# Patient Record
Sex: MALE | Race: WHITE | NOT HISPANIC OR LATINO | Employment: FULL TIME | URBAN - METROPOLITAN AREA
[De-identification: names, ages, dates, MRNs, and addresses within clinical notes are randomized per-mention and may not be internally consistent; named-entity substitution may affect disease eponyms.]

---

## 2017-01-06 ENCOUNTER — TRANSCRIBE ORDERS (OUTPATIENT)
Dept: ADMINISTRATIVE | Facility: HOSPITAL | Age: 42
End: 2017-01-06

## 2017-01-06 DIAGNOSIS — R59.9 SWOLLEN LYMPH NODES: Primary | ICD-10-CM

## 2017-01-12 ENCOUNTER — HOSPITAL ENCOUNTER (OUTPATIENT)
Dept: ULTRASOUND IMAGING | Facility: HOSPITAL | Age: 42
Discharge: HOME | End: 2017-01-12
Attending: SURGERY
Payer: COMMERCIAL

## 2017-01-12 DIAGNOSIS — R59.9 SWOLLEN LYMPH NODES: ICD-10-CM

## 2017-01-12 PROCEDURE — 76705 ECHO EXAM OF ABDOMEN: CPT

## 2017-01-23 ENCOUNTER — ANESTHESIA EVENT (OUTPATIENT)
Dept: PERIOP | Facility: HOSPITAL | Age: 42
End: 2017-01-23
Payer: COMMERCIAL

## 2017-01-23 RX ORDER — ACETAMINOPHEN 325 MG/1
650 TABLET ORAL EVERY 6 HOURS PRN
COMMUNITY

## 2017-01-23 RX ORDER — IBUPROFEN 400 MG/1
400 TABLET ORAL EVERY 6 HOURS PRN
COMMUNITY
End: 2020-10-06

## 2017-01-23 RX ORDER — CYCLOBENZAPRINE HCL 10 MG
10 TABLET ORAL 3 TIMES DAILY PRN
COMMUNITY
End: 2020-10-06

## 2017-01-23 RX ORDER — GABAPENTIN 300 MG/1
300 CAPSULE ORAL 3 TIMES DAILY
COMMUNITY
End: 2020-10-06

## 2017-01-24 ENCOUNTER — ANESTHESIA (OUTPATIENT)
Dept: PERIOP | Facility: HOSPITAL | Age: 42
End: 2017-01-24
Payer: COMMERCIAL

## 2017-01-24 ENCOUNTER — HOSPITAL ENCOUNTER (OUTPATIENT)
Facility: HOSPITAL | Age: 42
Setting detail: OUTPATIENT SURGERY
Discharge: HOME | End: 2017-01-24
Attending: SURGERY | Admitting: SURGERY
Payer: COMMERCIAL

## 2017-01-24 VITALS
WEIGHT: 195 LBS | HEIGHT: 72 IN | TEMPERATURE: 99.5 F | SYSTOLIC BLOOD PRESSURE: 144 MMHG | BODY MASS INDEX: 26.41 KG/M2 | DIASTOLIC BLOOD PRESSURE: 80 MMHG | RESPIRATION RATE: 16 BRPM | HEART RATE: 75 BPM | OXYGEN SATURATION: 96 %

## 2017-01-24 DIAGNOSIS — R59.9 ENLARGED LYMPH NODES, UNSPECIFIED: ICD-10-CM

## 2017-01-24 PROCEDURE — 88184 FLOWCYTOMETRY/ TC 1 MARKER: CPT | Performed by: SURGERY

## 2017-01-24 PROCEDURE — 88185 FLOWCYTOMETRY/TC ADD-ON: CPT | Performed by: ANESTHESIOLOGY

## 2017-01-24 PROCEDURE — 87205 SMEAR GRAM STAIN: CPT | Performed by: SURGERY

## 2017-01-24 PROCEDURE — 87075 CULTR BACTERIA EXCEPT BLOOD: CPT | Performed by: SURGERY

## 2017-01-24 PROCEDURE — 88305 TISSUE EXAM BY PATHOLOGIST: CPT | Performed by: SURGERY

## 2017-01-24 PROCEDURE — 88312 SPECIAL STAINS GROUP 1: CPT | Performed by: SURGERY

## 2017-01-24 PROCEDURE — 87116 MYCOBACTERIA CULTURE: CPT | Performed by: SURGERY

## 2017-01-24 PROCEDURE — 87206 SMEAR FLUORESCENT/ACID STAI: CPT | Performed by: SURGERY

## 2017-01-24 PROCEDURE — 87102 FUNGUS ISOLATION CULTURE: CPT | Performed by: SURGERY

## 2017-01-24 PROCEDURE — 87070 CULTURE OTHR SPECIMN AEROBIC: CPT | Performed by: SURGERY

## 2017-01-24 PROCEDURE — 87176 TISSUE HOMOGENIZATION CULTR: CPT | Performed by: SURGERY

## 2017-01-24 RX ORDER — OXYCODONE HYDROCHLORIDE AND ACETAMINOPHEN 5; 325 MG/1; MG/1
2 TABLET ORAL EVERY 4 HOURS PRN
Status: DISCONTINUED | OUTPATIENT
Start: 2017-01-24 | End: 2017-01-24 | Stop reason: HOSPADM

## 2017-01-24 RX ORDER — PROPOFOL 10 MG/ML
INJECTION, EMULSION INTRAVENOUS AS NEEDED
Status: DISCONTINUED | OUTPATIENT
Start: 2017-01-24 | End: 2017-01-24 | Stop reason: SURG

## 2017-01-24 RX ORDER — FENTANYL CITRATE 50 UG/ML
INJECTION, SOLUTION INTRAMUSCULAR; INTRAVENOUS AS NEEDED
Status: DISCONTINUED | OUTPATIENT
Start: 2017-01-24 | End: 2017-01-24 | Stop reason: SURG

## 2017-01-24 RX ORDER — SODIUM CHLORIDE, SODIUM LACTATE, POTASSIUM CHLORIDE, CALCIUM CHLORIDE 600; 310; 30; 20 MG/100ML; MG/100ML; MG/100ML; MG/100ML
125 INJECTION, SOLUTION INTRAVENOUS CONTINUOUS
Status: DISCONTINUED | OUTPATIENT
Start: 2017-01-24 | End: 2017-01-24 | Stop reason: HOSPADM

## 2017-01-24 RX ORDER — FENTANYL CITRATE/PF 50 MCG/ML
12.5 SYRINGE (ML) INJECTION
Status: DISCONTINUED | OUTPATIENT
Start: 2017-01-24 | End: 2017-01-24 | Stop reason: HOSPADM

## 2017-01-24 RX ORDER — ONDANSETRON 2 MG/ML
INJECTION INTRAMUSCULAR; INTRAVENOUS AS NEEDED
Status: DISCONTINUED | OUTPATIENT
Start: 2017-01-24 | End: 2017-01-24 | Stop reason: SURG

## 2017-01-24 RX ORDER — KETOROLAC TROMETHAMINE 30 MG/ML
INJECTION, SOLUTION INTRAMUSCULAR; INTRAVENOUS AS NEEDED
Status: DISCONTINUED | OUTPATIENT
Start: 2017-01-24 | End: 2017-01-24 | Stop reason: SURG

## 2017-01-24 RX ORDER — MIDAZOLAM HYDROCHLORIDE 1 MG/ML
INJECTION INTRAMUSCULAR; INTRAVENOUS AS NEEDED
Status: DISCONTINUED | OUTPATIENT
Start: 2017-01-24 | End: 2017-01-24 | Stop reason: SURG

## 2017-01-24 RX ORDER — SODIUM CHLORIDE, SODIUM LACTATE, POTASSIUM CHLORIDE, CALCIUM CHLORIDE 600; 310; 30; 20 MG/100ML; MG/100ML; MG/100ML; MG/100ML
100 INJECTION, SOLUTION INTRAVENOUS CONTINUOUS
Status: DISCONTINUED | OUTPATIENT
Start: 2017-01-24 | End: 2017-01-24 | Stop reason: HOSPADM

## 2017-01-24 RX ORDER — ONDANSETRON 2 MG/ML
4 INJECTION INTRAMUSCULAR; INTRAVENOUS EVERY 4 HOURS PRN
Status: DISCONTINUED | OUTPATIENT
Start: 2017-01-24 | End: 2017-01-24 | Stop reason: HOSPADM

## 2017-01-24 RX ORDER — CALCIUM CARBONATE 200(500)MG
2 TABLET,CHEWABLE ORAL 2 TIMES DAILY
COMMUNITY

## 2017-01-24 RX ORDER — BUPIVACAINE HYDROCHLORIDE AND EPINEPHRINE 2.5; 5 MG/ML; UG/ML
INJECTION, SOLUTION EPIDURAL; INFILTRATION; INTRACAUDAL; PERINEURAL AS NEEDED
Status: DISCONTINUED | OUTPATIENT
Start: 2017-01-24 | End: 2017-01-24 | Stop reason: HOSPADM

## 2017-01-24 RX ORDER — MORPHINE SULFATE 4 MG/ML
4 INJECTION, SOLUTION INTRAMUSCULAR; INTRAVENOUS
Status: DISCONTINUED | OUTPATIENT
Start: 2017-01-24 | End: 2017-01-24 | Stop reason: HOSPADM

## 2017-01-24 RX ORDER — SODIUM CHLORIDE, SODIUM LACTATE, POTASSIUM CHLORIDE, CALCIUM CHLORIDE 600; 310; 30; 20 MG/100ML; MG/100ML; MG/100ML; MG/100ML
50 INJECTION, SOLUTION INTRAVENOUS CONTINUOUS
Status: DISCONTINUED | OUTPATIENT
Start: 2017-01-24 | End: 2017-01-24 | Stop reason: HOSPADM

## 2017-01-24 RX ADMIN — SODIUM CHLORIDE, SODIUM LACTATE, POTASSIUM CHLORIDE, AND CALCIUM CHLORIDE 100 ML/HR: .6; .31; .03; .02 INJECTION, SOLUTION INTRAVENOUS at 10:47

## 2017-01-24 RX ADMIN — DEXAMETHASONE SODIUM PHOSPHATE 5 MG: 10 INJECTION INTRAMUSCULAR; INTRAVENOUS at 09:40

## 2017-01-24 RX ADMIN — FENTANYL CITRATE 25 MCG: 50 INJECTION, SOLUTION INTRAMUSCULAR; INTRAVENOUS at 10:00

## 2017-01-24 RX ADMIN — KETOROLAC TROMETHAMINE 30 MG: 30 INJECTION, SOLUTION INTRAMUSCULAR at 10:00

## 2017-01-24 RX ADMIN — SODIUM CHLORIDE, SODIUM LACTATE, POTASSIUM CHLORIDE, AND CALCIUM CHLORIDE 50 ML/HR: .6; .31; .03; .02 INJECTION, SOLUTION INTRAVENOUS at 08:25

## 2017-01-24 RX ADMIN — CEFAZOLIN SODIUM 2000 MG: 2 SOLUTION INTRAVENOUS at 09:35

## 2017-01-24 RX ADMIN — MIDAZOLAM HYDROCHLORIDE 2 MG: 1 INJECTION, SOLUTION INTRAMUSCULAR; INTRAVENOUS at 09:26

## 2017-01-24 RX ADMIN — PROPOFOL 200 MG: 10 INJECTION, EMULSION INTRAVENOUS at 09:30

## 2017-01-24 RX ADMIN — SODIUM CHLORIDE, SODIUM LACTATE, POTASSIUM CHLORIDE, AND CALCIUM CHLORIDE: .6; .31; .03; .02 INJECTION, SOLUTION INTRAVENOUS at 09:26

## 2017-01-24 RX ADMIN — FENTANYL CITRATE 25 MCG: 50 INJECTION, SOLUTION INTRAMUSCULAR; INTRAVENOUS at 09:59

## 2017-01-24 RX ADMIN — FENTANYL CITRATE 25 MCG: 50 INJECTION, SOLUTION INTRAMUSCULAR; INTRAVENOUS at 09:51

## 2017-01-24 RX ADMIN — ONDANSETRON 4 MG: 2 INJECTION INTRAMUSCULAR; INTRAVENOUS at 09:40

## 2017-01-24 RX ADMIN — FENTANYL CITRATE 25 MCG: 50 INJECTION, SOLUTION INTRAMUSCULAR; INTRAVENOUS at 09:30

## 2017-01-27 LAB
BACTERIA SPEC ANAEROBE CULT: NO GROWTH
BACTERIA TISS AEROBE CULT: NO GROWTH
GRAM STN SPEC: NORMAL

## 2017-01-31 LAB — SCAN RESULT: NORMAL

## 2017-02-27 LAB — FUNGUS SPEC CULT: NORMAL

## 2017-03-13 LAB
MYCOBACTERIUM SPEC CULT: NORMAL
RHODAMINE-AURAMINE STN SPEC: NORMAL

## 2017-05-11 ENCOUNTER — ALLSCRIPTS OFFICE VISIT (OUTPATIENT)
Dept: OTHER | Facility: OTHER | Age: 42
End: 2017-05-11

## 2017-06-29 ENCOUNTER — ALLSCRIPTS OFFICE VISIT (OUTPATIENT)
Dept: OTHER | Facility: OTHER | Age: 42
End: 2017-06-29

## 2018-01-11 NOTE — MISCELLANEOUS
Provider Comments  Provider Comments:   PT WAS A NO SHOW FOR HIS OV WITH DR MARQUES ON 06/29/2017 / Christelle Casas      Signatures   Electronically signed by : Stephen Sutton DO; Jun 29 2017  3:27PM EST                       (Author)

## 2018-01-13 NOTE — MISCELLANEOUS
Provider Comments  Provider Comments:   PATIENT WAS A NO SHOW FOR APPOINTMENT WITH DR Phoebe Peck      Signatures   Electronically signed by : Donta Fermin DO; May 12 2017  8:29AM EST                       (Author)

## 2020-10-06 ENCOUNTER — TELEMEDICINE (OUTPATIENT)
Dept: GASTROENTEROLOGY | Facility: AMBULARY SURGERY CENTER | Age: 45
End: 2020-10-06
Payer: COMMERCIAL

## 2020-10-06 ENCOUNTER — TELEPHONE (OUTPATIENT)
Dept: GASTROENTEROLOGY | Facility: AMBULARY SURGERY CENTER | Age: 45
End: 2020-10-06

## 2020-10-06 DIAGNOSIS — K44.9 HIATAL HERNIA: ICD-10-CM

## 2020-10-06 DIAGNOSIS — K21.9 GASTROESOPHAGEAL REFLUX DISEASE, UNSPECIFIED WHETHER ESOPHAGITIS PRESENT: Primary | ICD-10-CM

## 2020-10-06 PROCEDURE — 99202 OFFICE O/P NEW SF 15 MIN: CPT | Performed by: INTERNAL MEDICINE

## 2020-10-06 RX ORDER — PANTOPRAZOLE SODIUM 40 MG/1
40 TABLET, DELAYED RELEASE ORAL DAILY
Qty: 30 TABLET | Refills: 3 | Status: SHIPPED | OUTPATIENT
Start: 2020-10-06

## 2020-10-06 RX ORDER — ALPRAZOLAM 1 MG/1
1 TABLET ORAL 3 TIMES DAILY
COMMUNITY

## 2020-10-06 RX ORDER — CITALOPRAM 40 MG/1
40 TABLET ORAL DAILY
COMMUNITY

## 2020-10-06 RX ORDER — BUPRENORPHINE AND NALOXONE 12; 3 MG/1; MG/1
12 FILM, SOLUBLE BUCCAL; SUBLINGUAL 3 TIMES DAILY
COMMUNITY

## 2020-10-15 ENCOUNTER — TELEPHONE (OUTPATIENT)
Dept: GASTROENTEROLOGY | Facility: AMBULARY SURGERY CENTER | Age: 45
End: 2020-10-15

## 2020-11-01 ENCOUNTER — HOSPITAL ENCOUNTER (EMERGENCY)
Facility: HOSPITAL | Age: 45
Discharge: HOME | End: 2020-11-01
Attending: EMERGENCY MEDICINE | Admitting: EMERGENCY MEDICINE
Payer: COMMERCIAL

## 2020-11-01 ENCOUNTER — APPOINTMENT (EMERGENCY)
Dept: RADIOLOGY | Facility: HOSPITAL | Age: 45
End: 2020-11-01
Payer: COMMERCIAL

## 2020-11-01 VITALS
SYSTOLIC BLOOD PRESSURE: 106 MMHG | DIASTOLIC BLOOD PRESSURE: 63 MMHG | RESPIRATION RATE: 18 BRPM | WEIGHT: 205 LBS | HEART RATE: 70 BPM | TEMPERATURE: 98.1 F | BODY MASS INDEX: 27.8 KG/M2 | OXYGEN SATURATION: 95 %

## 2020-11-01 DIAGNOSIS — R50.9 FEVER OF UNKNOWN ORIGIN: Primary | ICD-10-CM

## 2020-11-01 LAB
ALBUMIN SERPL BCP-MCNC: 4.1 G/DL (ref 3.4–4.8)
ALP SERPL-CCNC: 85.5 U/L (ref 10–129)
ALT SERPL W P-5'-P-CCNC: 17 U/L (ref 5–63)
ANION GAP SERPL CALCULATED.3IONS-SCNC: 6 MMOL/L (ref 4–13)
APTT PPP: 29 SECONDS (ref 23–31)
AST SERPL W P-5'-P-CCNC: 29 U/L (ref 15–41)
BASOPHILS # BLD AUTO: 0.02 THOUSANDS/ΜL (ref 0–0.1)
BASOPHILS NFR BLD AUTO: 0 % (ref 0–1)
BILIRUB SERPL-MCNC: 0.28 MG/DL (ref 0.3–1.2)
BILIRUB UR QL STRIP: ABNORMAL
BUN SERPL-MCNC: 14 MG/DL (ref 6–20)
CALCIUM SERPL-MCNC: 8.9 MG/DL (ref 8.4–10.2)
CHLORIDE SERPL-SCNC: 105 MMOL/L (ref 96–108)
CLARITY UR: CLEAR
CO2 SERPL-SCNC: 27 MMOL/L (ref 22–33)
COLOR UR: YELLOW
CREAT SERPL-MCNC: 0.72 MG/DL (ref 0.5–1.2)
CRP SERPL QL: 0.3 MG/L (ref 0–1)
EOSINOPHIL # BLD AUTO: 0.25 THOUSAND/ΜL (ref 0–0.61)
EOSINOPHIL NFR BLD AUTO: 3 % (ref 0–6)
ERYTHROCYTE [DISTWIDTH] IN BLOOD BY AUTOMATED COUNT: 13.4 % (ref 11.6–15.1)
FLUAV RNA NPH QL NAA+PROBE: NORMAL
FLUBV RNA NPH QL NAA+PROBE: NORMAL
GFR SERPL CREATININE-BSD FRML MDRD: 114 ML/MIN/1.73SQ M
GLUCOSE SERPL-MCNC: 91 MG/DL (ref 65–140)
GLUCOSE UR STRIP-MCNC: NEGATIVE MG/DL
HCT VFR BLD AUTO: 43 % (ref 36.5–49.3)
HGB BLD-MCNC: 14.8 G/DL (ref 12–17)
HGB UR QL STRIP.AUTO: NEGATIVE
IMM GRANULOCYTES # BLD AUTO: 0.05 THOUSAND/UL (ref 0–0.2)
IMM GRANULOCYTES NFR BLD AUTO: 1 % (ref 0–2)
INR PPP: 0.98 (ref 0.9–1.1)
KETONES UR STRIP-MCNC: NEGATIVE MG/DL
LACTATE SERPL-SCNC: 1.9 MMOL/L (ref 0–2)
LEUKOCYTE ESTERASE UR QL STRIP: NEGATIVE
LYMPHOCYTES # BLD AUTO: 2.6 THOUSANDS/ΜL (ref 0.6–4.47)
LYMPHOCYTES NFR BLD AUTO: 35 % (ref 14–44)
MCH RBC QN AUTO: 30.6 PG (ref 26.8–34.3)
MCHC RBC AUTO-ENTMCNC: 34.4 G/DL (ref 31.4–37.4)
MCV RBC AUTO: 89 FL (ref 82–98)
MONOCYTES # BLD AUTO: 0.46 THOUSAND/ΜL (ref 0.17–1.22)
MONOCYTES NFR BLD AUTO: 6 % (ref 4–12)
NEUTROPHILS # BLD AUTO: 4 THOUSANDS/ΜL (ref 1.85–7.62)
NEUTS SEG NFR BLD AUTO: 55 % (ref 43–75)
NITRITE UR QL STRIP: NEGATIVE
PH UR STRIP.AUTO: 5.5 [PH]
PLATELET # BLD AUTO: 208 THOUSANDS/UL (ref 149–390)
PMV BLD AUTO: 11.1 FL (ref 8.9–12.7)
POTASSIUM SERPL-SCNC: 4.5 MMOL/L (ref 3.5–5)
POTASSIUM SERPL-SCNC: 5.1 MMOL/L (ref 3.5–5)
PROT SERPL-MCNC: 7.4 G/DL (ref 6.4–8.3)
PROT UR STRIP-MCNC: NEGATIVE MG/DL
PROTHROMBIN TIME: 11.1 SECONDS (ref 9.5–12.1)
RBC # BLD AUTO: 4.83 MILLION/UL (ref 3.88–5.62)
RSV RNA NPH QL NAA+PROBE: NORMAL
SODIUM SERPL-SCNC: 138 MMOL/L (ref 133–145)
SP GR UR STRIP.AUTO: >=1.03 (ref 1–1.03)
TROPONIN I SERPL-MCNC: <0.03 NG/ML (ref 0–0.07)
UROBILINOGEN UR QL STRIP.AUTO: 0.2 E.U./DL
WBC # BLD AUTO: 7.38 THOUSAND/UL (ref 4.31–10.16)

## 2020-11-01 PROCEDURE — 81003 URINALYSIS AUTO W/O SCOPE: CPT | Performed by: EMERGENCY MEDICINE

## 2020-11-01 PROCEDURE — 86666 EHRLICHIA ANTIBODY: CPT | Performed by: EMERGENCY MEDICINE

## 2020-11-01 PROCEDURE — 84132 ASSAY OF SERUM POTASSIUM: CPT | Performed by: EMERGENCY MEDICINE

## 2020-11-01 PROCEDURE — 87147 CULTURE TYPE IMMUNOLOGIC: CPT | Performed by: EMERGENCY MEDICINE

## 2020-11-01 PROCEDURE — 80053 COMPREHEN METABOLIC PANEL: CPT | Performed by: EMERGENCY MEDICINE

## 2020-11-01 PROCEDURE — 96360 HYDRATION IV INFUSION INIT: CPT

## 2020-11-01 PROCEDURE — 83605 ASSAY OF LACTIC ACID: CPT | Performed by: EMERGENCY MEDICINE

## 2020-11-01 PROCEDURE — 84484 ASSAY OF TROPONIN QUANT: CPT | Performed by: EMERGENCY MEDICINE

## 2020-11-01 PROCEDURE — 71046 X-RAY EXAM CHEST 2 VIEWS: CPT

## 2020-11-01 PROCEDURE — 99284 EMERGENCY DEPT VISIT MOD MDM: CPT

## 2020-11-01 PROCEDURE — 85730 THROMBOPLASTIN TIME PARTIAL: CPT | Performed by: EMERGENCY MEDICINE

## 2020-11-01 PROCEDURE — 86618 LYME DISEASE ANTIBODY: CPT | Performed by: EMERGENCY MEDICINE

## 2020-11-01 PROCEDURE — 36415 COLL VENOUS BLD VENIPUNCTURE: CPT | Performed by: EMERGENCY MEDICINE

## 2020-11-01 PROCEDURE — 86140 C-REACTIVE PROTEIN: CPT | Performed by: EMERGENCY MEDICINE

## 2020-11-01 PROCEDURE — 80074 ACUTE HEPATITIS PANEL: CPT | Performed by: EMERGENCY MEDICINE

## 2020-11-01 PROCEDURE — 86753 PROTOZOA ANTIBODY NOS: CPT | Performed by: EMERGENCY MEDICINE

## 2020-11-01 PROCEDURE — 86480 TB TEST CELL IMMUN MEASURE: CPT | Performed by: EMERGENCY MEDICINE

## 2020-11-01 PROCEDURE — 99284 EMERGENCY DEPT VISIT MOD MDM: CPT | Performed by: EMERGENCY MEDICINE

## 2020-11-01 PROCEDURE — 85610 PROTHROMBIN TIME: CPT | Performed by: EMERGENCY MEDICINE

## 2020-11-01 PROCEDURE — 87631 RESP VIRUS 3-5 TARGETS: CPT | Performed by: EMERGENCY MEDICINE

## 2020-11-01 PROCEDURE — 85025 COMPLETE CBC W/AUTO DIFF WBC: CPT | Performed by: EMERGENCY MEDICINE

## 2020-11-01 PROCEDURE — 87040 BLOOD CULTURE FOR BACTERIA: CPT | Performed by: EMERGENCY MEDICINE

## 2020-11-01 RX ADMIN — SODIUM CHLORIDE 1000 ML: 0.9 INJECTION, SOLUTION INTRAVENOUS at 16:06

## 2020-11-02 LAB
HAV IGM SER QL: ABNORMAL
HBV CORE IGM SER QL: ABNORMAL
HBV SURFACE AG SER QL: ABNORMAL
HCV AB SER QL: ABNORMAL

## 2020-11-03 LAB — B BURGDOR IGG+IGM SER-ACNC: 50

## 2020-11-04 LAB
A PHAGOCYTOPH IGG TITR SER IF: NEGATIVE {TITER}
A PHAGOCYTOPH IGM TITR SER IF: NEGATIVE {TITER}
B MICROTI IGG TITR SER: NORMAL {TITER}
B MICROTI IGM TITR SER: NORMAL {TITER}
BACTERIA BLD CULT: ABNORMAL
E CHAFFEENSIS IGG TITR SER IF: NEGATIVE {TITER}
E CHAFFEENSIS IGM TITR SER IF: NEGATIVE {TITER}
GAMMA INTERFERON BACKGROUND BLD IA-ACNC: 0.04 IU/ML
GRAM STN SPEC: ABNORMAL
M TB IFN-G BLD-IMP: NEGATIVE
M TB IFN-G CD4+ BCKGRND COR BLD-ACNC: 0 IU/ML
M TB IFN-G CD4+ BCKGRND COR BLD-ACNC: 0.01 IU/ML
MITOGEN IGNF BCKGRD COR BLD-ACNC: >10 IU/ML

## 2020-11-06 LAB — BACTERIA BLD CULT: NORMAL

## 2020-11-12 ENCOUNTER — LAB (OUTPATIENT)
Dept: LAB | Facility: HOSPITAL | Age: 45
End: 2020-11-12
Payer: COMMERCIAL

## 2020-11-12 ENCOUNTER — DOCUMENTATION (OUTPATIENT)
Dept: INFECTIOUS DISEASES | Facility: CLINIC | Age: 45
End: 2020-11-12

## 2020-11-12 ENCOUNTER — OFFICE VISIT (OUTPATIENT)
Dept: INFECTIOUS DISEASES | Facility: CLINIC | Age: 45
End: 2020-11-12
Payer: COMMERCIAL

## 2020-11-12 VITALS
RESPIRATION RATE: 17 BRPM | WEIGHT: 213 LBS | HEART RATE: 97 BPM | TEMPERATURE: 99.8 F | DIASTOLIC BLOOD PRESSURE: 65 MMHG | HEIGHT: 72 IN | BODY MASS INDEX: 28.85 KG/M2 | SYSTOLIC BLOOD PRESSURE: 125 MMHG

## 2020-11-12 DIAGNOSIS — R53.81 MALAISE AND FATIGUE: ICD-10-CM

## 2020-11-12 DIAGNOSIS — R50.9 FUO (FEVER OF UNKNOWN ORIGIN): ICD-10-CM

## 2020-11-12 DIAGNOSIS — R74.01 TRANSAMINITIS: ICD-10-CM

## 2020-11-12 DIAGNOSIS — R53.83 MALAISE AND FATIGUE: ICD-10-CM

## 2020-11-12 DIAGNOSIS — R50.9 FUO (FEVER OF UNKNOWN ORIGIN): Primary | ICD-10-CM

## 2020-11-12 LAB
ERYTHROCYTE [SEDIMENTATION RATE] IN BLOOD: 13 MM/HOUR (ref 0–14)
TSH SERPL DL<=0.05 MIU/L-ACNC: 1.4 UIU/ML (ref 0.36–3.74)

## 2020-11-12 PROCEDURE — 84443 ASSAY THYROID STIM HORMONE: CPT

## 2020-11-12 PROCEDURE — 86665 EPSTEIN-BARR CAPSID VCA: CPT

## 2020-11-12 PROCEDURE — 99204 OFFICE O/P NEW MOD 45 MIN: CPT | Performed by: INTERNAL MEDICINE

## 2020-11-12 PROCEDURE — 87389 HIV-1 AG W/HIV-1&-2 AB AG IA: CPT

## 2020-11-12 PROCEDURE — 86663 EPSTEIN-BARR ANTIBODY: CPT

## 2020-11-12 PROCEDURE — 87040 BLOOD CULTURE FOR BACTERIA: CPT

## 2020-11-12 PROCEDURE — 86664 EPSTEIN-BARR NUCLEAR ANTIGEN: CPT

## 2020-11-12 PROCEDURE — 85652 RBC SED RATE AUTOMATED: CPT

## 2020-11-12 PROCEDURE — 87522 HEPATITIS C REVRS TRNSCRPJ: CPT

## 2020-11-12 PROCEDURE — 36415 COLL VENOUS BLD VENIPUNCTURE: CPT

## 2020-11-12 PROCEDURE — 86038 ANTINUCLEAR ANTIBODIES: CPT

## 2020-11-12 PROCEDURE — 86592 SYPHILIS TEST NON-TREP QUAL: CPT

## 2020-11-13 LAB
EBV NA IGG SER IA-ACNC: 369 U/ML (ref 0–17.9)
EBV VCA IGG SER IA-ACNC: 37.7 U/ML (ref 0–17.9)
EBV VCA IGM SER IA-ACNC: <36 U/ML (ref 0–35.9)
HIV 1+2 AB+HIV1 P24 AG SERPL QL IA: NORMAL
INTERPRETATION: ABNORMAL
RPR SER QL: NORMAL
RYE IGE QN: NEGATIVE

## 2020-11-15 LAB
HCV RNA SERPL NAA+PROBE-ACNC: 360 IU/ML
HCV RNA SERPL NAA+PROBE-LOG IU: 2.56 LOG10 IU/ML
TEST INFORMATION: NORMAL

## 2020-11-17 LAB — BACTERIA BLD CULT: NORMAL

## 2020-11-20 DIAGNOSIS — R76.8 HEPATITIS C ANTIBODY POSITIVE IN BLOOD: Primary | ICD-10-CM

## 2020-12-03 RX ORDER — SODIUM CHLORIDE, SODIUM LACTATE, POTASSIUM CHLORIDE, CALCIUM CHLORIDE 600; 310; 30; 20 MG/100ML; MG/100ML; MG/100ML; MG/100ML
125 INJECTION, SOLUTION INTRAVENOUS CONTINUOUS
Status: CANCELLED | OUTPATIENT
Start: 2020-12-03

## 2020-12-04 ENCOUNTER — HOSPITAL ENCOUNTER (OUTPATIENT)
Dept: GASTROENTEROLOGY | Facility: AMBULARY SURGERY CENTER | Age: 45
Setting detail: OUTPATIENT SURGERY
Discharge: HOME | End: 2020-12-04
Attending: INTERNAL MEDICINE

## 2023-01-05 ENCOUNTER — APPOINTMENT (EMERGENCY)
Dept: RADIOLOGY | Facility: HOSPITAL | Age: 48
End: 2023-01-05

## 2023-01-05 ENCOUNTER — HOSPITAL ENCOUNTER (EMERGENCY)
Facility: HOSPITAL | Age: 48
Discharge: HOME | End: 2023-01-05
Attending: EMERGENCY MEDICINE

## 2023-01-05 ENCOUNTER — APPOINTMENT (EMERGENCY)
Dept: CT IMAGING | Facility: HOSPITAL | Age: 48
End: 2023-01-05

## 2023-01-05 VITALS
DIASTOLIC BLOOD PRESSURE: 76 MMHG | TEMPERATURE: 98.7 F | SYSTOLIC BLOOD PRESSURE: 109 MMHG | RESPIRATION RATE: 20 BRPM | HEART RATE: 80 BPM | OXYGEN SATURATION: 95 %

## 2023-01-05 DIAGNOSIS — E86.0 DEHYDRATION: ICD-10-CM

## 2023-01-05 DIAGNOSIS — R55 SYNCOPE: Primary | ICD-10-CM

## 2023-01-05 LAB
ALBUMIN SERPL BCP-MCNC: 4.1 G/DL (ref 3.5–5)
ALP SERPL-CCNC: 97 U/L (ref 34–104)
ALT SERPL W P-5'-P-CCNC: 99 U/L (ref 7–52)
ANION GAP SERPL CALCULATED.3IONS-SCNC: 11 MMOL/L (ref 4–13)
AST SERPL W P-5'-P-CCNC: 70 U/L (ref 13–39)
BASOPHILS # BLD AUTO: 0.03 THOUSANDS/ÂΜL (ref 0–0.1)
BASOPHILS NFR BLD AUTO: 1 % (ref 0–1)
BILIRUB SERPL-MCNC: 0.98 MG/DL (ref 0.2–1)
BILIRUB UR QL STRIP: NEGATIVE
BUN SERPL-MCNC: 13 MG/DL (ref 5–25)
CALCIUM SERPL-MCNC: 9.1 MG/DL (ref 8.4–10.2)
CARDIAC TROPONIN I PNL SERPL HS: 3 NG/L
CHLORIDE SERPL-SCNC: 101 MMOL/L (ref 96–108)
CLARITY UR: CLEAR
CO2 SERPL-SCNC: 22 MMOL/L (ref 21–32)
COLOR UR: YELLOW
CREAT SERPL-MCNC: 0.76 MG/DL (ref 0.6–1.3)
EOSINOPHIL # BLD AUTO: 0.09 THOUSAND/ÂΜL (ref 0–0.61)
EOSINOPHIL NFR BLD AUTO: 1 % (ref 0–6)
ERYTHROCYTE [DISTWIDTH] IN BLOOD BY AUTOMATED COUNT: 12.6 % (ref 11.6–15.1)
FLUAV RNA RESP QL NAA+PROBE: NEGATIVE
FLUBV RNA RESP QL NAA+PROBE: NEGATIVE
GFR SERPL CREATININE-BSD FRML MDRD: 110 ML/MIN/1.73SQ M
GLUCOSE SERPL-MCNC: 128 MG/DL (ref 65–140)
GLUCOSE SERPL-MCNC: 70 MG/DL (ref 65–140)
GLUCOSE SERPL-MCNC: 72 MG/DL (ref 65–140)
GLUCOSE UR STRIP-MCNC: NEGATIVE MG/DL
HCT VFR BLD AUTO: 44.8 % (ref 36.5–49.3)
HGB BLD-MCNC: 15.1 G/DL (ref 12–17)
HGB UR QL STRIP.AUTO: NEGATIVE
IMM GRANULOCYTES # BLD AUTO: 0.06 THOUSAND/UL (ref 0–0.2)
IMM GRANULOCYTES NFR BLD AUTO: 1 % (ref 0–2)
KETONES UR STRIP-MCNC: ABNORMAL MG/DL
LEUKOCYTE ESTERASE UR QL STRIP: NEGATIVE
LYMPHOCYTES # BLD AUTO: 1.74 THOUSANDS/ÂΜL (ref 0.6–4.47)
LYMPHOCYTES NFR BLD AUTO: 27 % (ref 14–44)
MAGNESIUM SERPL-MCNC: 2 MG/DL (ref 1.9–2.7)
MCH RBC QN AUTO: 29.2 PG (ref 26.8–34.3)
MCHC RBC AUTO-ENTMCNC: 33.7 G/DL (ref 31.4–37.4)
MCV RBC AUTO: 87 FL (ref 82–98)
MONOCYTES # BLD AUTO: 0.44 THOUSAND/ÂΜL (ref 0.17–1.22)
MONOCYTES NFR BLD AUTO: 7 % (ref 4–12)
NEUTROPHILS # BLD AUTO: 4.1 THOUSANDS/ÂΜL (ref 1.85–7.62)
NEUTS SEG NFR BLD AUTO: 63 % (ref 43–75)
NITRITE UR QL STRIP: NEGATIVE
NRBC BLD AUTO-RTO: 0 /100 WBCS
PH UR STRIP.AUTO: 5.5 [PH]
PLATELET # BLD AUTO: 198 THOUSANDS/UL (ref 149–390)
PMV BLD AUTO: 10.6 FL (ref 8.9–12.7)
POTASSIUM SERPL-SCNC: 3.6 MMOL/L (ref 3.5–5.3)
PROT SERPL-MCNC: 7.2 G/DL (ref 6.4–8.4)
PROT UR STRIP-MCNC: NEGATIVE MG/DL
RBC # BLD AUTO: 5.17 MILLION/UL (ref 3.88–5.62)
RSV RNA RESP QL NAA+PROBE: NEGATIVE
SARS-COV-2 RNA RESP QL NAA+PROBE: NEGATIVE
SODIUM SERPL-SCNC: 134 MMOL/L (ref 135–147)
SP GR UR STRIP.AUTO: 1.02 (ref 1–1.03)
UROBILINOGEN UR STRIP-ACNC: <2 MG/DL
WBC # BLD AUTO: 6.46 THOUSAND/UL (ref 4.31–10.16)

## 2023-01-05 RX ADMIN — SODIUM CHLORIDE 1000 ML: 0.9 INJECTION, SOLUTION INTRAVENOUS at 07:25

## 2023-01-05 NOTE — ED PROVIDER NOTES
History  Chief Complaint   Patient presents with   • Syncope     Woke up @ 3:30am from L shoulder pain + hunger, reports decreased appetite recently  Reports anxiety, L sided chest pain radiating to L shoulder, dizziness and subsequent syncopal episode where pt "came to on the kitchen floor with my wife standing over me on the phone"  +LOC, +headstrike  Pt reports seizure like activity, "shaking" on the ground, unable to respond verbally for "less than one minute"     40 yo M coming into the ED for eval of witnessed syncopal episode this morning around 330am  He states he was feeling dizzy, went down suddenly, hit his head  He was unconscious for about 1-2 minutes per his wife  He did have some brief shaking movements  No tongue biting, no incontinence  He states he feels weak at this point  He did have some chest pain and left shoulder pain just prior to the episode  Also mentions poor oral intake, hasn't eaten at all in the past two days since Tuesday due to lack of funds, but today is pay-day, so he plans on going to buy food later  History provided by:  Patient   used: No    Syncope  Episode history:  Single  Associated symptoms: chest pain        Prior to Admission Medications   Prescriptions Last Dose Informant Patient Reported? Taking?    ALPRAZolam (XANAX) 1 mg tablet   Yes No   Sig: Take 1 mg by mouth 3 (three) times a day   Buprenorphine HCl-Naloxone HCl 12-3 MG FILM   Yes No   Sig: Place 12 mg under the tongue 3 (three) times a day   acetaminophen (TYLENOL) 325 mg tablet   Yes No   Sig: Take 650 mg by mouth every 6 (six) hours as needed for mild pain   calcium carbonate (TUMS) 500 mg chewable tablet   Yes No   Sig: Chew 2 tablets 2 (two) times a day   citalopram (CeleXA) 40 mg tablet   Yes No   Sig: Take 40 mg by mouth daily   pantoprazole (PROTONIX) 40 mg tablet   No No   Sig: Take 1 tablet (40 mg total) by mouth daily   Patient not taking: Reported on 11/12/2020 Facility-Administered Medications: None       Past Medical History:   Diagnosis Date   • Low back pain with sciatica        Past Surgical History:   Procedure Laterality Date   • ESOPHAGOGASTRODUODENOSCOPY N/A    • KNEE ARTHROSCOPY Right    • MI BX/EXC LYMPH NODE OPEN SUPERFICIAL Left 1/24/2017    Procedure: INGUINAL LYMPH NODE BIOPSY WITH FLOW CYTOMETRY;  Surgeon: Nacho Merrill DO;  Location: [facility];  Service: General       History reviewed  No pertinent family history  I have reviewed and agree with the history as documented  E-Cigarette/Vaping     E-Cigarette/Vaping Substances   • Nicotine Yes      Social History     Tobacco Use   • Smoking status: Former     Packs/day: 1 00     Years: 25 00     Pack years: 25 00     Types: Cigarettes   • Smokeless tobacco: Current   • Tobacco comments:     Vape   Substance Use Topics   • Alcohol use: No   • Drug use: No       Review of Systems   Cardiovascular: Positive for chest pain and syncope  Syncope   All other systems reviewed and are negative  Physical Exam  Physical Exam  Vitals and nursing note reviewed  Constitutional:       General: He is not in acute distress  Appearance: Normal appearance  He is normal weight  He is not ill-appearing  HENT:      Head: Normocephalic and atraumatic  Right Ear: External ear normal       Left Ear: External ear normal       Nose: Nose normal  No congestion or rhinorrhea  Mouth/Throat:      Mouth: Mucous membranes are moist       Pharynx: Oropharynx is clear  Eyes:      General: No scleral icterus  Right eye: No discharge  Left eye: No discharge  Extraocular Movements: Extraocular movements intact  Conjunctiva/sclera: Conjunctivae normal       Pupils: Pupils are equal, round, and reactive to light  Cardiovascular:      Rate and Rhythm: Normal rate and regular rhythm  Pulses: Normal pulses  Heart sounds: Normal heart sounds  No murmur heard      No friction rub  No gallop  Pulmonary:      Effort: Pulmonary effort is normal  No respiratory distress  Breath sounds: Normal breath sounds  Abdominal:      General: Abdomen is flat  There is no distension  Palpations: Abdomen is soft  There is no mass  Tenderness: There is no abdominal tenderness  There is no guarding or rebound  Hernia: No hernia is present  Musculoskeletal:         General: No swelling  Normal range of motion  Cervical back: Normal range of motion and neck supple  No rigidity or tenderness  Skin:     General: Skin is warm and dry  Capillary Refill: Capillary refill takes less than 2 seconds  Neurological:      General: No focal deficit present  Mental Status: He is alert and oriented to person, place, and time  Mental status is at baseline  Cranial Nerves: No cranial nerve deficit  Sensory: No sensory deficit  Motor: No weakness        Coordination: Coordination normal       Gait: Gait normal    Psychiatric:         Mood and Affect: Mood normal          Behavior: Behavior normal          Vital Signs  ED Triage Vitals   Temperature Pulse Respirations Blood Pressure SpO2   01/05/23 0733 01/05/23 0646 01/05/23 0646 01/05/23 0646 01/05/23 0646   98 7 °F (37 1 °C) 78 20 119/61 96 %      Temp Source Heart Rate Source Patient Position - Orthostatic VS BP Location FiO2 (%)   01/05/23 0733 01/05/23 0646 01/05/23 0646 01/05/23 0646 --   Oral Monitor Lying Left arm       Pain Score       --                  Vitals:    01/05/23 0913 01/05/23 0914 01/05/23 0915 01/05/23 0918   BP: 117/66 116/72 120/74 109/76   Pulse: 65 86 70 80   Patient Position - Orthostatic VS: Lying - Orthostatic VS Sitting - Orthostatic VS Standing - Orthostatic VS          Visual Acuity      ED Medications  Medications   sodium chloride 0 9 % bolus 1,000 mL (0 mL Intravenous Stopped 1/5/23 0910)       Diagnostic Studies  Results Reviewed     Procedure Component Value Units Date/Time Fingerstick Glucose (POCT) [[id number]]  (Normal) Collected: 01/05/23 0845    Lab Status: Final result Updated: 01/05/23 0846     POC Glucose 128 mg/dl     UA (URINE) with reflex to Scope [[id number]]  (Abnormal) Collected: 01/05/23 0818    Lab Status: Final result Specimen: Urine, Clean Catch Updated: 01/05/23 0825     Color, UA Yellow     Clarity, UA Clear     Specific Gravity, UA 1 016     pH, UA 5 5     Leukocytes, UA Negative     Nitrite, UA Negative     Protein, UA Negative mg/dl      Glucose, UA Negative mg/dl      Ketones,  (3+) mg/dl      Urobilinogen, UA <2 0 mg/dl      Bilirubin, UA Negative     Occult Blood, UA Negative    HS Troponin 0hr (reflex protocol) [[id number]]  (Normal) Collected: 01/05/23 0724    Lab Status: Final result Specimen: Blood from Arm, Right Updated: 01/05/23 0809     hs TnI 0hr 3 ng/L     FLU/RSV/COVID - if FLU/RSV clinically relevant [[id number]]  (Normal) Collected: 01/05/23 0724    Lab Status: Final result Specimen: Nares from Nose Updated: 01/05/23 0808     SARS-CoV-2 Negative     INFLUENZA A PCR Negative     INFLUENZA B PCR Negative     RSV PCR Negative    Narrative:      FOR PEDIATRIC PATIENTS - copy/paste COVID Guidelines URL to browser: [email or web address]    SARS-CoV-2 assay is a Nucleic Acid Amplification assay intended for the  qualitative detection of nucleic acid from SARS-CoV-2 in nasopharyngeal  swabs  Results are for the presumptive identification of SARS-CoV-2 RNA  Positive results are indicative of infection with SARS-CoV-2, the virus  causing COVID-19, but do not rule out bacterial infection or co-infection  with other viruses  Laboratories within the [place] and its  territories are required to report all positive results to the appropriate  public health authorities   Negative results do not preclude SARS-CoV-2  infection and should not be used as the sole basis for treatment or other  patient management decisions  Negative results must be combined with  clinical observations, patient history, and epidemiological information  This test has not been FDA cleared or approved  This test has been authorized by FDA under an Emergency Use Authorization  (EUA)  This test is only authorized for the duration of time the  declaration that circumstances exist justifying the authorization of the  emergency use of an in vitro diagnostic tests for detection of SARS-CoV-2  virus and/or diagnosis of COVID-19 infection under section 564(b)(1) of  the Act, 21 U  S C  708SUJ-4(W)(4), unless the authorization is terminated  or revoked sooner  The test has been validated but independent review by FDA  and CLIA is pending  Test performed using [employer] Gene[employer]pert: This RT-PCR assay targets N2,  a region unique to SARS-CoV-2  A conserved region in the E-gene was chosen  for pan-Sarbecovirus detection which includes SARS-CoV-2  According to CMS-2020-01-R, this platform meets the definition of high-throughput technology      Comprehensive metabolic panel [[id number]]  (Abnormal) Collected: 01/05/23 0724    Lab Status: Final result Specimen: Blood from Arm, Right Updated: 01/05/23 0801     Sodium 134 mmol/L      Potassium 3 6 mmol/L      Chloride 101 mmol/L      CO2 22 mmol/L      ANION GAP 11 mmol/L      BUN 13 mg/dL      Creatinine 0 76 mg/dL      Glucose 72 mg/dL      Calcium 9 1 mg/dL      AST 70 U/L      ALT 99 U/L      Alkaline Phosphatase 97 U/L      Total Protein 7 2 g/dL      Albumin 4 1 g/dL      Total Bilirubin 0 98 mg/dL      eGFR 110 ml/min/1 73sq m     Narrative:      Jammie guidelines for Chronic Kidney Disease (CKD):   •  Stage 1 with normal or high GFR (GFR > 90 mL/min/1 73 square meters)  •  Stage 2 Mild CKD (GFR = 60-89 mL/min/1 73 square meters)  •  Stage 3A Moderate CKD (GFR = 45-59 mL/min/1 73 square meters)  •  Stage 3B Moderate CKD (GFR = 30-44 mL/min/1 73 square meters)  • Stage 4 Severe CKD (GFR = 15-29 mL/min/1 73 square meters)  •  Stage 5 End Stage CKD (GFR <15 mL/min/1 73 square meters)  Note: GFR calculation is accurate only with a steady state creatinine    Magnesium [[id number]]  (Normal) Collected: 01/05/23 0724    Lab Status: Final result Specimen: Blood from Arm, Right Updated: 01/05/23 0801     Magnesium 2 0 mg/dL     CBC and differential [[id number]] Collected: 01/05/23 0724    Lab Status: Final result Specimen: Blood from Arm, Right Updated: 01/05/23 0732     WBC 6 46 Thousand/uL      RBC 5 17 Million/uL      Hemoglobin 15 1 g/dL      Hematocrit 44 8 %      MCV 87 fL      MCH 29 2 pg      MCHC 33 7 g/dL      RDW 12 6 %      MPV 10 6 fL      Platelets 135 Thousands/uL      nRBC 0 /100 WBCs      Neutrophils Relative 63 %      Immat GRANS % 1 %      Lymphocytes Relative 27 %      Monocytes Relative 7 %      Eosinophils Relative 1 %      Basophils Relative 1 %      Neutrophils Absolute 4 10 Thousands/µL      Immature Grans Absolute 0 06 Thousand/uL      Lymphocytes Absolute 1 74 Thousands/µL      Monocytes Absolute 0 44 Thousand/µL      Eosinophils Absolute 0 09 Thousand/µL      Basophils Absolute 0 03 Thousands/µL     Fingerstick Glucose (POCT) [[id number]]  (Normal) Collected: 01/05/23 0709    Lab Status: Final result Updated: 01/05/23 0710     POC Glucose 70 mg/dl                  CT head without contrast   Final Result by Imtiaz Kumar MD (01/05 1944)      1  No acute intracranial abnormality  2   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  Workstation performed: [id number]         XR chest 1 view portable   ED Interpretation by Cheyenne Viera DO (01/05 5704)   No acute abnormalities  Final Result by Jenny Cifuentes MD (01/05 1125)      No active pulmonary disease                    Workstation performed: [id number]                    Procedures  ECG 12 Lead Documentation Only    Date/Time: 1/5/2023 8:03 AM  Performed by: Martha Gonzalez DO Fausto  Authorized by: Mercedes Haro DO     Indications / Diagnosis:  Syncope  ECG reviewed by me, the ED Provider: yes    Patient location:  ED  Previous ECG:     Previous ECG:  Compared to current    Comparison to cardiac monitor: Yes    Interpretation:     Interpretation: normal    Rate:     ECG rate:  63    ECG rate assessment: normal    Rhythm:     Rhythm: sinus rhythm    Ectopy:     Ectopy: none    QRS:     QRS axis:  Normal    QRS intervals:  Normal  Conduction:     Conduction: normal    ST segments:     ST segments:  Normal  T waves:     T waves: normal               ED Course                                             Medical Decision Making  42 yo M with syncopal episode this morning, states lighteaded, dizzy, then passed out and woke up on the floor  He did hit his head  States poor fluid and food intake the past two days due to lack of money  His spouse reported some brief shaking activity  He quickly returned to baseline mental status at home  Normal exam in the ED, vitals normal    Workup: cardiac w/u negative  CT head normal  Suspect vasovagal syncope, vs  Orthostatic hypotension from poor food intake  Pt responded nicely to food and IV fluids  Stable for d/c home, given cardiology f/u, RTER precautions  Dehydration: acute illness or injury  Syncope: acute illness or injury  Amount and/or Complexity of Data Reviewed  Labs: ordered  Radiology: ordered and independent interpretation performed            Disposition  Final diagnoses:   Syncope   Dehydration     Time reflects when diagnosis was documented in both MDM as applicable and the Disposition within this note     Time User Action Codes Description Comment    1/5/2023  9:18 AM Martin Bhatt Add [R55] Syncope     1/5/2023  9:18 AM Martin Bhatt Add [E86 0] Dehydration       ED Disposition     ED Disposition   Discharge    Condition   Stable    Date/Time   Thu Jan 5, 2023  9:17 AM    Comment   Julián Gonzales discharge to home/self care                Follow-up Information     Follow up With Specialties Details Why Contact Info Additional Information    Jolene Aparicio DO Family Medicine In 3 days  1 [facility] [address] [place] [address] [facility]   [address] [facility] [phone] [id number] [facility] [address] [facility], [address] [place] [address], [facility] 59          Discharge Medication List as of 1/5/2023  9:21 AM      CONTINUE these medications which have NOT CHANGED    Details   acetaminophen (TYLENOL) 325 mg tablet Take 650 mg by mouth every 6 (six) hours as needed for mild pain, Until Discontinued, Historical Med      ALPRAZolam (XANAX) 1 mg tablet Take 1 mg by mouth 3 (three) times a day, Historical Med      Buprenorphine HCl-Naloxone HCl 12-3 MG FILM Place 12 mg under the tongue 3 (three) times a day, Historical Med      calcium carbonate (TUMS) 500 mg chewable tablet Chew 2 tablets 2 (two) times a day, Until Discontinued, Historical Med      citalopram (CeleXA) 40 mg tablet Take 40 mg by mouth daily, Historical Med      pantoprazole (PROTONIX) 40 mg tablet Take 1 tablet (40 mg total) by mouth daily, Starting Tue 10/6/2020, Normal             No discharge procedures on file      PDMP Review     None          ED Provider  Electronically Signed by           Heather Blood DO  01/07/23 8213

## 2023-01-06 LAB
ATRIAL RATE: 64 BPM
ATRIAL RATE: 78 BPM
P AXIS: 52 DEGREES
P AXIS: 55 DEGREES
PR INTERVAL: 142 MS
PR INTERVAL: 148 MS
QRS AXIS: 47 DEGREES
QRS AXIS: 53 DEGREES
QRSD INTERVAL: 100 MS
QRSD INTERVAL: 100 MS
QT INTERVAL: 418 MS
QT INTERVAL: 440 MS
QTC INTERVAL: 453 MS
QTC INTERVAL: 476 MS
T WAVE AXIS: 46 DEGREES
T WAVE AXIS: 54 DEGREES
VENTRICULAR RATE: 64 BPM
VENTRICULAR RATE: 78 BPM

## 2023-01-09 ENCOUNTER — APPOINTMENT (EMERGENCY)
Dept: RADIOLOGY | Facility: HOSPITAL | Age: 48
End: 2023-01-09

## 2023-01-09 ENCOUNTER — HOSPITAL ENCOUNTER (EMERGENCY)
Facility: HOSPITAL | Age: 48
Discharge: HOME | End: 2023-01-09
Attending: EMERGENCY MEDICINE

## 2023-01-09 ENCOUNTER — APPOINTMENT (EMERGENCY)
Dept: CT IMAGING | Facility: HOSPITAL | Age: 48
End: 2023-01-09

## 2023-01-09 VITALS
OXYGEN SATURATION: 98 % | TEMPERATURE: 97.8 F | WEIGHT: 205 LBS | RESPIRATION RATE: 16 BRPM | DIASTOLIC BLOOD PRESSURE: 54 MMHG | BODY MASS INDEX: 27.8 KG/M2 | SYSTOLIC BLOOD PRESSURE: 118 MMHG | HEART RATE: 72 BPM

## 2023-01-09 DIAGNOSIS — R26.9 GAIT DISTURBANCE: ICD-10-CM

## 2023-01-09 DIAGNOSIS — I95.1 ORTHOSTATIC HYPOTENSION: ICD-10-CM

## 2023-01-09 DIAGNOSIS — H10.31 ACUTE BACTERIAL CONJUNCTIVITIS OF RIGHT EYE: Primary | ICD-10-CM

## 2023-01-09 DIAGNOSIS — R42 DIZZINESS: ICD-10-CM

## 2023-01-09 DIAGNOSIS — R51.9 HEADACHE: ICD-10-CM

## 2023-01-09 LAB
ALBUMIN SERPL BCP-MCNC: 3.7 G/DL (ref 3.5–5)
ALP SERPL-CCNC: 81 U/L (ref 34–104)
ALT SERPL W P-5'-P-CCNC: 82 U/L (ref 7–52)
ANION GAP SERPL CALCULATED.3IONS-SCNC: 5 MMOL/L (ref 4–13)
AST SERPL W P-5'-P-CCNC: 55 U/L (ref 13–39)
ATRIAL RATE: 89 BPM
BASOPHILS # BLD AUTO: 0.03 THOUSANDS/ÂΜL (ref 0–0.1)
BASOPHILS NFR BLD AUTO: 1 % (ref 0–1)
BILIRUB SERPL-MCNC: 0.35 MG/DL (ref 0.2–1)
BUN SERPL-MCNC: 7 MG/DL (ref 5–25)
CALCIUM SERPL-MCNC: 8.7 MG/DL (ref 8.4–10.2)
CARDIAC TROPONIN I PNL SERPL HS: 2 NG/L
CHLORIDE SERPL-SCNC: 107 MMOL/L (ref 96–108)
CO2 SERPL-SCNC: 26 MMOL/L (ref 21–32)
CREAT SERPL-MCNC: 0.71 MG/DL (ref 0.6–1.3)
EOSINOPHIL # BLD AUTO: 0.14 THOUSAND/ÂΜL (ref 0–0.61)
EOSINOPHIL NFR BLD AUTO: 2 % (ref 0–6)
ERYTHROCYTE [DISTWIDTH] IN BLOOD BY AUTOMATED COUNT: 12.3 % (ref 11.6–15.1)
FOLATE SERPL-MCNC: 11 NG/ML (ref 3.1–17.5)
GFR SERPL CREATININE-BSD FRML MDRD: 113 ML/MIN/1.73SQ M
GLUCOSE SERPL-MCNC: 85 MG/DL (ref 65–140)
HCT VFR BLD AUTO: 41.5 % (ref 36.5–49.3)
HGB BLD-MCNC: 14 G/DL (ref 12–17)
IMM GRANULOCYTES # BLD AUTO: 0.02 THOUSAND/UL (ref 0–0.2)
IMM GRANULOCYTES NFR BLD AUTO: 0 % (ref 0–2)
LYMPHOCYTES # BLD AUTO: 2.17 THOUSANDS/ÂΜL (ref 0.6–4.47)
LYMPHOCYTES NFR BLD AUTO: 35 % (ref 14–44)
MAGNESIUM SERPL-MCNC: 1.8 MG/DL (ref 1.9–2.7)
MCH RBC QN AUTO: 29 PG (ref 26.8–34.3)
MCHC RBC AUTO-ENTMCNC: 33.7 G/DL (ref 31.4–37.4)
MCV RBC AUTO: 86 FL (ref 82–98)
MONOCYTES # BLD AUTO: 0.44 THOUSAND/ÂΜL (ref 0.17–1.22)
MONOCYTES NFR BLD AUTO: 7 % (ref 4–12)
NEUTROPHILS # BLD AUTO: 3.44 THOUSANDS/ÂΜL (ref 1.85–7.62)
NEUTS SEG NFR BLD AUTO: 55 % (ref 43–75)
NRBC BLD AUTO-RTO: 0 /100 WBCS
P AXIS: 58 DEGREES
PHOSPHATE SERPL-MCNC: 3.8 MG/DL (ref 2.7–4.5)
PLATELET # BLD AUTO: 192 THOUSANDS/UL (ref 149–390)
PMV BLD AUTO: 10.1 FL (ref 8.9–12.7)
POTASSIUM SERPL-SCNC: 3.7 MMOL/L (ref 3.5–5.3)
PR INTERVAL: 126 MS
PROT SERPL-MCNC: 6.7 G/DL (ref 6.4–8.4)
QRS AXIS: 57 DEGREES
QRSD INTERVAL: 94 MS
QT INTERVAL: 378 MS
QTC INTERVAL: 459 MS
RBC # BLD AUTO: 4.83 MILLION/UL (ref 3.88–5.62)
SODIUM SERPL-SCNC: 138 MMOL/L (ref 135–147)
T WAVE AXIS: 32 DEGREES
TSH SERPL DL<=0.05 MIU/L-ACNC: 1.24 UIU/ML (ref 0.45–4.5)
VENTRICULAR RATE: 89 BPM
VIT B12 SERPL-MCNC: 749 PG/ML (ref 100–900)
WBC # BLD AUTO: 6.24 THOUSAND/UL (ref 4.31–10.16)

## 2023-01-09 RX ORDER — TOBRAMYCIN 3 MG/ML
1 SOLUTION/ DROPS OPHTHALMIC
Status: COMPLETED | OUTPATIENT
Start: 2023-01-09 | End: 2023-01-09

## 2023-01-09 RX ORDER — TOBRAMYCIN AND DEXAMETHASONE 3; 1 MG/ML; MG/ML
1 SUSPENSION/ DROPS OPHTHALMIC
Qty: 5 ML | Refills: 0 | Status: SHIPPED | OUTPATIENT
Start: 2023-01-09

## 2023-01-09 RX ORDER — TETRACAINE HYDROCHLORIDE 5 MG/ML
2 SOLUTION OPHTHALMIC ONCE
Status: COMPLETED | OUTPATIENT
Start: 2023-01-09 | End: 2023-01-09

## 2023-01-09 RX ADMIN — FLUORESCEIN SODIUM 1 STRIP: 1 STRIP OPHTHALMIC at 19:22

## 2023-01-09 RX ADMIN — SODIUM CHLORIDE 1000 ML: 0.9 INJECTION, SOLUTION INTRAVENOUS at 17:38

## 2023-01-09 RX ADMIN — IOHEXOL 85 ML: 350 INJECTION, SOLUTION INTRAVENOUS at 17:25

## 2023-01-09 RX ADMIN — TETRACAINE HYDROCHLORIDE 2 DROP: 5 SOLUTION OPHTHALMIC at 19:22

## 2023-01-09 RX ADMIN — TOBRAMYCIN 1 DROP: 3 SOLUTION/ DROPS OPHTHALMIC at 21:33

## 2023-01-09 RX ADMIN — SODIUM CHLORIDE 500 ML: 0.9 INJECTION, SOLUTION INTRAVENOUS at 21:00

## 2023-01-09 NOTE — ED ATTENDING ATTESTATION
1/9/2023  IReyes MD, saw and evaluated the patient  I have discussed the patient with the resident/non-physician practitioner and agree with the resident's/non-physician practitioner's findings, Plan of Care, and MDM as documented in the resident's/non-physician practitioner's note, except where noted  All available labs and Radiology studies were reviewed  I was present for key portions of any procedure(s) performed by the resident/non-physician practitioner and I was immediately available to provide assistance  At this point I agree with the current assessment done in the Emergency Department  I have conducted an independent evaluation of this patient a history and physical is as follows:    21-year-old male presents for evaluation of intermittent vertigo as well as difficulty ambulating over the last few days  He had one syncopal episode and was seen in the ED on 1/5 afterwards  Work-up negative at the time  Reported progressive symptoms since that time preventing him from working as an   States with his symptoms he is also unable to drive  Symptoms are mostly present when he goes from a sitting to standing position but do also come on while he is upright for a while and also sometimes even while he is sitting or lying down  Reports few days of right eye irritation, injection  Denies any known injury  Vitals normal   Heart sounds normal   Breath sounds clear  He has no focal neurologic findings here  A little sluggish with heel-to-shin bilaterally  Normal finger-to-nose  Equal strength in all extremities  Unstable gait  Plan CTA head and neck, labs including B12/folate as patient notes he has difficulty obtaining appropriate food  ED Course  ED Course as of 01/09/23 2148   Mon Jan 09, 2023 1803 AST(!): 55   1803 ALT(!): 82  Improved from 4 days ago  1803 Magnesium(!): 1 8 2001 CTA unremarkable  Symptomatic improvement with IV fluids    We will continue fluids, feed, reevaluate  Fluorescein stain negative for corneal abrasion  We will start ophthalmic antibiotic for conjunctivitis  2147 Folate: 11 0   2147 Vitamin B-12: 749   2147 Patient asymptomatic at this time  CTA within normal limits  Mild transaminitis, improved from previous values  Offered admission for further work-up, neurology consultation  Patient declined and would prefer further outpatient work-up if needed           Critical Care Time  Procedures

## 2023-01-09 NOTE — Clinical Note
Julián Gonzales was seen and treated in our emergency department on 1/9/2023  Diagnosis:     Mariana Montero  may return to work on return date  He may return on this date: 01/10/2023         If you have any questions or concerns, please don't hesitate to call        Ed Faulkner DO    ______________________________           _______________          _______________  Hospital Representative                              Date                                Time

## 2023-01-10 NOTE — ED PROVIDER NOTES
History  Chief Complaint   Patient presents with   • Dizziness     Pt comes to ed with headache and dizziness  States he was seen here a few days ago after a syncopal episode w  Dehydration and sent home  • Eye Redness     R eye redness painful and blurry      Mr Stefany Ceballos is a 41 yom with history of sarcoidosis who presents with cc dizziness  Notes onset of mild, room spinning dizziness with occasional "wobbly" gait on 1/3/23, then experienced chest pain with radiation to the left arm with a syncopal episode on 1/5  Was seen in ED, had negative cardiac workup and negative non-contrast head CT at that time, was to follow up with PCP this week  Woke up yesterday with bitemporal headache, throbbing in nature, worse with palpation of the temple, accompanied by mild bilateral blurred vision, right eye pain and redness, and worsening "wobbling"  Symptoms not relieved by excedrin migraine  Pain is worsening today  Denies fever, chills, facial droop, weakness, numbness, tingling, sore throat, dysarthria, dysphagia, chest pain, shortness of breath, cough, abdominal pain, nausea, vomiting, diarrhea, dysuria, hematuria, urinary retention, bowl incontinence, saddle anesthesia, lymphadenopathy, unilateral calf pain or swelling, or peripheral edema  No recent injuries or illnesses, no possible foreign bodies in eye  Also feels dehydrated due to poor access to food and water  Has chronic intermittent hemoptysis and night sweats for which he has previously been evaluated, at baseline  Has chronic low back pain, slightly worse from baseline over the past few weeks but without radicular symptoms  Does not drink alcohol or use recreational drugs  Prior to Admission Medications   Prescriptions Last Dose Informant Patient Reported? Taking?    ALPRAZolam (XANAX) 1 mg tablet   Yes No   Sig: Take 1 mg by mouth 3 (three) times a day   Buprenorphine HCl-Naloxone HCl 12-3 MG FILM   Yes No   Sig: Place 12 mg under the tongue 3 (three) times a day   acetaminophen (TYLENOL) 325 mg tablet   Yes No   Sig: Take 650 mg by mouth every 6 (six) hours as needed for mild pain   calcium carbonate (TUMS) 500 mg chewable tablet   Yes No   Sig: Chew 2 tablets 2 (two) times a day   citalopram (CeleXA) 40 mg tablet   Yes No   Sig: Take 40 mg by mouth daily   pantoprazole (PROTONIX) 40 mg tablet   No No   Sig: Take 1 tablet (40 mg total) by mouth daily   Patient not taking: Reported on 11/12/2020      Facility-Administered Medications: None       Past Medical History:   Diagnosis Date   • Low back pain with sciatica        Past Surgical History:   Procedure Laterality Date   • ESOPHAGOGASTRODUODENOSCOPY N/A    • KNEE ARTHROSCOPY Right    • UT BX/EXC LYMPH NODE OPEN SUPERFICIAL Left 1/24/2017    Procedure: INGUINAL LYMPH NODE BIOPSY WITH FLOW CYTOMETRY;  Surgeon: Nacho Merrill DO;  Location: [facility];  Service: General       History reviewed  No pertinent family history  I have reviewed and agree with the history as documented  E-Cigarette/Vaping     E-Cigarette/Vaping Substances   • Nicotine Yes      Social History     Tobacco Use   • Smoking status: Former     Packs/day: 1 00     Years: 25 00     Pack years: 25 00     Types: Cigarettes   • Smokeless tobacco: Current   • Tobacco comments:     Vape   Substance Use Topics   • Alcohol use: No   • Drug use: No        Review of Systems   Constitutional: Positive for fatigue  Negative for appetite change, chills, diaphoresis and fever  HENT: Negative for congestion, facial swelling, postnasal drip, rhinorrhea, sinus pressure, sinus pain, sneezing, sore throat, trouble swallowing and voice change  Eyes: Positive for pain, redness and visual disturbance  Respiratory: Negative  Negative for cough and shortness of breath  Cardiovascular: Negative  Negative for chest pain, palpitations and leg swelling  Gastrointestinal: Negative    Negative for abdominal distention, abdominal pain, blood in stool, constipation, diarrhea, nausea and vomiting  Endocrine: Negative  Genitourinary: Negative  Musculoskeletal: Negative  Negative for back pain and neck pain  Skin: Negative for pallor and rash  Neurological: Positive for dizziness, light-headedness and headaches  Negative for syncope, facial asymmetry, speech difficulty, weakness and numbness  Hematological: Negative  Psychiatric/Behavioral: Negative  Negative for confusion  All other systems reviewed and are negative  Physical Exam  ED Triage Vitals   Temperature Pulse Respirations Blood Pressure SpO2   01/09/23 1618 01/09/23 1618 01/09/23 1618 01/09/23 1618 01/09/23 1618   97 8 °F (36 6 °C) 104 18 131/77 97 %      Temp Source Heart Rate Source Patient Position - Orthostatic VS BP Location FiO2 (%)   01/09/23 1618 01/09/23 1912 01/09/23 1915 01/09/23 1912 --   Oral Monitor Lying - Orthostatic VS Right arm       Pain Score       01/09/23 1635       7             Orthostatic Vital Signs  Vitals:    01/09/23 1915 01/09/23 1916 01/09/23 1917 01/09/23 1920   BP: 158/87 150/83 130/70 121/66   Pulse: 86 80 80 77   Patient Position - Orthostatic VS: Lying - Orthostatic VS Sitting - Orthostatic VS Standing - Orthostatic VS Standing for 3 minutes - Orthostatic VS       Physical Exam  Vitals and nursing note reviewed  Exam conducted with a chaperone present  Constitutional:       General: He is not in acute distress  Appearance: Normal appearance  He is not diaphoretic  HENT:      Head: Normocephalic and atraumatic  Right Ear: Tympanic membrane and external ear normal       Left Ear: Tympanic membrane and external ear normal       Nose: Nose normal  No congestion or rhinorrhea  Mouth/Throat:      Mouth: Mucous membranes are dry  Pharynx: Oropharynx is clear  No oropharyngeal exudate or posterior oropharyngeal erythema  Eyes:      General: Lids are everted, no foreign bodies appreciated   Visual field deficit present  No scleral icterus  Right eye: No discharge  Left eye: No discharge  Extraocular Movements: Extraocular movements intact  Right eye: Normal extraocular motion and no nystagmus  Left eye: Normal extraocular motion and no nystagmus  Conjunctiva/sclera:      Right eye: Right conjunctiva is injected  No chemosis, exudate or hemorrhage  Left eye: Left conjunctiva is not injected  Pupils: Pupils are equal, round, and reactive to light  Cardiovascular:      Rate and Rhythm: Normal rate and regular rhythm  Pulses: Normal pulses  Heart sounds: Normal heart sounds  No murmur heard  No friction rub  No gallop  Pulmonary:      Effort: Pulmonary effort is normal  No respiratory distress  Breath sounds: Normal breath sounds  No wheezing, rhonchi or rales  Abdominal:      General: Abdomen is flat  Bowel sounds are normal  There is no distension  Palpations: Abdomen is soft  Tenderness: There is no abdominal tenderness  Musculoskeletal:      Cervical back: Normal, normal range of motion and neck supple  Thoracic back: Normal       Lumbar back: Tenderness present  No spasms  Normal range of motion  Positive left straight leg raise test  Negative right straight leg raise test       Right hip: Normal       Left hip: Normal       Right knee: Normal       Left knee: Normal       Right lower leg: No edema  Left lower leg: No edema  Right ankle: Normal       Left ankle: Normal       Right foot: Normal       Left foot: Normal       Comments: Tenderness to palpation midline over L1 and L2 without underlying deformity  Lymphadenopathy:      Cervical: No cervical adenopathy  Skin:     General: Skin is warm and dry  Capillary Refill: Capillary refill takes less than 2 seconds  Neurological:      Mental Status: He is alert and oriented to person, place, and time  GCS: GCS eye subscore is 4  GCS verbal subscore is 5   GCS motor subscore is 6  Cranial Nerves: No cranial nerve deficit, dysarthria or facial asymmetry  Sensory: No sensory deficit  Motor: Motor function is intact  No weakness, tremor, abnormal muscle tone or pronator drift  Coordination: Coordination is intact  Coordination normal  Finger-Nose-Finger Test and Heel to Karuna Lusher Test normal       Gait: Gait abnormal       Comments: Gait initially staggering/shuffling  Decreased visual acuity bilaterally, however this was not present on subsequent neuro exams  Psychiatric:         Mood and Affect: Mood normal          Behavior: Behavior normal          ED Medications  Medications   sodium chloride 0 9 % bolus 500 mL (has no administration in time range)   sodium chloride 0 9 % bolus 1,000 mL (1,000 mL Intravenous New Bag 1/9/23 1738)   iohexol (OMNIPAQUE) 350 MG/ML injection (MULTI-DOSE) 85 mL (85 mL Intravenous Given 1/9/23 1725)   tetracaine 0 5 % ophthalmic solution 2 drop (2 drops Both Eyes Given by Other 1/9/23 1922)   fluorescein sodium sterile ophthalmic strip 1 strip (1 strip Both Eyes Given by Other 1/9/23 1922)       Diagnostic Studies  Results Reviewed     Procedure Component Value Units Date/Time    HS Troponin 0hr (reflex protocol) [[id number]]  (Normal) Collected: 01/09/23 1722    Lab Status: Final result Specimen: Blood from Arm, Left Updated: 01/09/23 1837     hs TnI 0hr 2 ng/L     TSH, 3rd generation with Free T4 reflex [[id number]]  (Normal) Collected: 01/09/23 1722    Lab Status: Final result Specimen: Blood from Arm, Left Updated: 01/09/23 1805     TSH 3RD GENERATON 1 239 uIU/mL     Narrative:      Patients undergoing fluorescein dye angiography may retain small amounts of fluorescein in the body for 48-72 hours post procedure  Samples containing fluorescein can produce falsely depressed TSH values  If the patient had this procedure,a specimen should be resubmitted post fluorescein clearance        Comprehensive metabolic panel [[id number]] (Abnormal) Collected: 01/09/23 1722    Lab Status: Final result Specimen: Blood from Arm, Left Updated: 01/09/23 1750     Sodium 138 mmol/L      Potassium 3 7 mmol/L      Chloride 107 mmol/L      CO2 26 mmol/L      ANION GAP 5 mmol/L      BUN 7 mg/dL      Creatinine 0 71 mg/dL      Glucose 85 mg/dL      Calcium 8 7 mg/dL      AST 55 U/L      ALT 82 U/L      Alkaline Phosphatase 81 U/L      Total Protein 6 7 g/dL      Albumin 3 7 g/dL      Total Bilirubin 0 35 mg/dL      eGFR 113 ml/min/1 73sq m     Narrative:      [facility] guidelines for Chronic Kidney Disease (CKD):   •  Stage 1 with normal or high GFR (GFR > 90 mL/min/1 73 square meters)  •  Stage 2 Mild CKD (GFR = 60-89 mL/min/1 73 square meters)  •  Stage 3A Moderate CKD (GFR = 45-59 mL/min/1 73 square meters)  •  Stage 3B Moderate CKD (GFR = 30-44 mL/min/1 73 square meters)  •  Stage 4 Severe CKD (GFR = 15-29 mL/min/1 73 square meters)  •  Stage 5 End Stage CKD (GFR <15 mL/min/1 73 square meters)  Note: GFR calculation is accurate only with a steady state creatinine    Magnesium [[id number]]  (Abnormal) Collected: 01/09/23 1722    Lab Status: Final result Specimen: Blood from Arm, Left Updated: 01/09/23 1750     Magnesium 1 8 mg/dL     Phosphorus [[id number]]  (Normal) Collected: 01/09/23 1722    Lab Status: Final result Specimen: Blood from Arm, Left Updated: 01/09/23 1750     Phosphorus 3 8 mg/dL     CBC and differential [[id number]] Collected: 01/09/23 1722    Lab Status: Final result Specimen: Blood from Arm, Left Updated: 01/09/23 1730     WBC 6 24 Thousand/uL      RBC 4 83 Million/uL      Hemoglobin 14 0 g/dL      Hematocrit 41 5 %      MCV 86 fL      MCH 29 0 pg      MCHC 33 7 g/dL      RDW 12 3 %      MPV 10 1 fL      Platelets 245 Thousands/uL      nRBC 0 /100 WBCs      Neutrophils Relative 55 %      Immat GRANS % 0 %      Lymphocytes Relative 35 %      Monocytes Relative 7 %      Eosinophils Relative 2 %      Basophils Relative 1 %      Neutrophils Absolute 3 44 Thousands/µL      Immature Grans Absolute 0 02 Thousand/uL      Lymphocytes Absolute 2 17 Thousands/µL      Monocytes Absolute 0 44 Thousand/µL      Eosinophils Absolute 0 14 Thousand/µL      Basophils Absolute 0 03 Thousands/µL     Vitamin B12 [[id number]] Collected: 01/09/23 1722    Lab Status: In process Specimen: Blood from Arm, Left Updated: 01/09/23 1724    Folate [[id number]] Collected: 01/09/23 1722    Lab Status: In process Specimen: Blood from Arm, Left Updated: 01/09/23 1724                 CTA head and neck with and without contrast   Final Result by Mya Xiao MD (01/09 1812)         1  No evidence of acute infarct, intracranial hemorrhage or mass  2   No stenosis, dissection or occlusion of the carotid or vertebral arteries or major vessels of the "[employer] of [place]                    Workstation performed: [id number]         XR chest 1 view portable    (Results Pending)         Procedures  ECG 12 Lead Documentation Only    Date/Time: 1/9/2023 4:44 PM  Performed by: Zuleima Hernandez DO  Authorized by: Zuleima Hernandez DO     Indications / Diagnosis:  Dizziness  ECG reviewed by me, the ED Provider: yes    Patient location:  ED  Previous ECG:     Previous ECG:  Compared to current    Comparison ECG info:  1/5/2022    Similarity:  Changes noted  Interpretation:     Interpretation: normal    Quality:     Tracing quality:  Limited by artifact  Rate:     ECG rate:  89    ECG rate assessment: normal    Rhythm:     Rhythm: sinus rhythm    Ectopy:     Ectopy: none    QRS:     QRS axis:  Normal    QRS intervals:  Normal  Conduction:     Conduction: normal    ST segments:     ST segments:  Normal  T waves:     T waves: normal            ED Course  ED Course as of 01/10/23 0102 Mon Jan 09, 2023   1711 Pt is a 41 yom with history of sarcoidosis who presents with continued gait disturbance x 6 days, seen in ED 5 days ago after syncopal episode accompanied by chest pain and left arm numbness, d/c with f/u  Woke up yestderday with severe bitemporal headache, bilateral blurred vision, right eye pain and redness and worsening "wobbly gait" and room spinning dizziness, however denies localized numbness or tingling, facial droop, weakness, chest pain, shortness of breath, abdominal pain, nausea, or vomiting  Has chronic low back pain, worsening over the past few weeks without incident, however denies urinary retention, bowel incontinence, saddle anesthesia, fever, or worsening back pain at night  Also has chronic hemoptysis and night sweats at least once a month, was previously told this was due to sarcoidosis and as hiatal hernia, at baseline  Examination with injected right eye without evidence of foreign body, bilateral decreased vision, bitemporal tenderness to palpation, dry oral mucosa, midline tenderness over L1-L2(states this is chronic), +SLR on left, and ataxic gait  Will obtain basic labs, electrolytes, B12, folate, TSH, ECG, CXR, and CTA head and neck  Out of window for stroke alert  1756 Comprehensive metabolic panel(!)  Chronic transaminitis, at baseline  1757 Magnesium(!): 1 8  Mildly decreased  1757 CBC and differential  WNL  1757 Phosphorus  WNL  1815 CTA head and neck with and without contrast  IMPRESSION:        1  No evidence of acute infarct, intracranial hemorrhage or mass  2   No stenosis, dissection or occlusion of the carotid or vertebral arteries or major vessels of the [place] of [place]  1815 IOP:  OD: 8  OS: 13  Doubt acute closure angle glaucoma  Globes intact on CT  No evidence of foreign body or exudates on exam      1817 TSH, 3rd generation with Free T4 reflex  WNL  1922 + orthostatic decrease in SBP from 150 to 121 from lying to standing  1951 Pt able to ambulate, no longer feels dizzy  Will give additional fluid bolus due to + orthostatics, give PO food and fluids, reassess     1952 Bilateral fluorescein stains negative  2047 Vitamin B12  WNL  2047 Folate  WNL  2125 Pt continues to be asymptomatic, is requesting discharge  Will treat right conjunctivitis with antibiotic eye drops, strict return precautions discussed  To f/u with PCP this week  2141 Pt able to ambulate without difficulty prior to discharge  Remained asymptomatic while ambulating to exit  Medical Decision Making  See ED course for MDM  All symptoms completely resolved after IV fluids with exception to right eye redness and pain  Acute bacterial conjunctivitis of right eye: acute illness or injury  Dizziness: acute illness or injury  Gait disturbance: acute illness or injury  Headache: acute illness or injury  Orthostatic hypotension: acute illness or injury  Amount and/or Complexity of Data Reviewed  Labs: ordered  Decision-making details documented in ED Course  Radiology: ordered  Decision-making details documented in ED Course  Risk  Prescription drug management  Disposition  Final diagnoses:   None     ED Disposition     None      Follow-up Information    None         Patient's Medications   Discharge Prescriptions    No medications on file     No discharge procedures on file  PDMP Review     None           ED Provider  Attending physically available and evaluated Santosh Lanier I managed the patient along with the ED Attending      Electronically Signed by         Gualberto Hernandez DO  01/10/23 0598

## 2023-01-11 ENCOUNTER — APPOINTMENT (EMERGENCY)
Dept: RADIOLOGY | Facility: HOSPITAL | Age: 48
End: 2023-01-11

## 2023-01-11 ENCOUNTER — HOSPITAL ENCOUNTER (EMERGENCY)
Facility: HOSPITAL | Age: 48
Discharge: HOME | End: 2023-01-11
Attending: EMERGENCY MEDICINE

## 2023-01-11 VITALS
SYSTOLIC BLOOD PRESSURE: 113 MMHG | OXYGEN SATURATION: 97 % | HEART RATE: 72 BPM | TEMPERATURE: 98 F | DIASTOLIC BLOOD PRESSURE: 70 MMHG | RESPIRATION RATE: 16 BRPM

## 2023-01-11 DIAGNOSIS — R42 LIGHTHEADEDNESS: Primary | ICD-10-CM

## 2023-01-11 DIAGNOSIS — R42 DIZZINESS: ICD-10-CM

## 2023-01-11 LAB
ANION GAP SERPL CALCULATED.3IONS-SCNC: 4 MMOL/L (ref 4–13)
ATRIAL RATE: 58 BPM
BASOPHILS # BLD AUTO: 0.03 THOUSANDS/ÂΜL (ref 0–0.1)
BASOPHILS NFR BLD AUTO: 1 % (ref 0–1)
BUN SERPL-MCNC: 8 MG/DL (ref 5–25)
CALCIUM SERPL-MCNC: 8.9 MG/DL (ref 8.4–10.2)
CARDIAC TROPONIN I PNL SERPL HS: 2 NG/L
CHLORIDE SERPL-SCNC: 105 MMOL/L (ref 96–108)
CO2 SERPL-SCNC: 28 MMOL/L (ref 21–32)
CREAT SERPL-MCNC: 0.88 MG/DL (ref 0.6–1.3)
EOSINOPHIL # BLD AUTO: 0.13 THOUSAND/ÂΜL (ref 0–0.61)
EOSINOPHIL NFR BLD AUTO: 3 % (ref 0–6)
ERYTHROCYTE [DISTWIDTH] IN BLOOD BY AUTOMATED COUNT: 12.2 % (ref 11.6–15.1)
GFR SERPL CREATININE-BSD FRML MDRD: 103 ML/MIN/1.73SQ M
GLUCOSE SERPL-MCNC: 102 MG/DL (ref 65–140)
HCT VFR BLD AUTO: 44.7 % (ref 36.5–49.3)
HGB BLD-MCNC: 15.2 G/DL (ref 12–17)
IMM GRANULOCYTES # BLD AUTO: 0.02 THOUSAND/UL (ref 0–0.2)
IMM GRANULOCYTES NFR BLD AUTO: 0 % (ref 0–2)
LYMPHOCYTES # BLD AUTO: 1.55 THOUSANDS/ÂΜL (ref 0.6–4.47)
LYMPHOCYTES NFR BLD AUTO: 31 % (ref 14–44)
MAGNESIUM SERPL-MCNC: 2 MG/DL (ref 1.9–2.7)
MCH RBC QN AUTO: 29.2 PG (ref 26.8–34.3)
MCHC RBC AUTO-ENTMCNC: 34 G/DL (ref 31.4–37.4)
MCV RBC AUTO: 86 FL (ref 82–98)
MONOCYTES # BLD AUTO: 0.27 THOUSAND/ÂΜL (ref 0.17–1.22)
MONOCYTES NFR BLD AUTO: 5 % (ref 4–12)
NEUTROPHILS # BLD AUTO: 2.96 THOUSANDS/ÂΜL (ref 1.85–7.62)
NEUTS SEG NFR BLD AUTO: 60 % (ref 43–75)
NRBC BLD AUTO-RTO: 0 /100 WBCS
P AXIS: 43 DEGREES
PLATELET # BLD AUTO: 204 THOUSANDS/UL (ref 149–390)
PMV BLD AUTO: 10.8 FL (ref 8.9–12.7)
POTASSIUM SERPL-SCNC: 4 MMOL/L (ref 3.5–5.3)
PR INTERVAL: 154 MS
QRS AXIS: 44 DEGREES
QRSD INTERVAL: 102 MS
QT INTERVAL: 426 MS
QTC INTERVAL: 418 MS
RBC # BLD AUTO: 5.21 MILLION/UL (ref 3.88–5.62)
SODIUM SERPL-SCNC: 137 MMOL/L (ref 135–147)
T WAVE AXIS: 37 DEGREES
VENTRICULAR RATE: 58 BPM
WBC # BLD AUTO: 4.96 THOUSAND/UL (ref 4.31–10.16)

## 2023-01-11 RX ORDER — ONDANSETRON 4 MG/1
4 TABLET, FILM COATED ORAL EVERY 6 HOURS
Qty: 12 TABLET | Refills: 0 | Status: SHIPPED | OUTPATIENT
Start: 2023-01-11

## 2023-01-11 RX ORDER — MECLIZINE HYDROCHLORIDE 25 MG/1
25 TABLET ORAL 3 TIMES DAILY PRN
Qty: 30 TABLET | Refills: 0 | Status: SHIPPED | OUTPATIENT
Start: 2023-01-11

## 2023-01-11 RX ORDER — MECLIZINE HCL 12.5 MG/1
25 TABLET ORAL ONCE
Status: COMPLETED | OUTPATIENT
Start: 2023-01-11 | End: 2023-01-11

## 2023-01-11 RX ORDER — ACETAMINOPHEN 325 MG/1
975 TABLET ORAL ONCE
Status: COMPLETED | OUTPATIENT
Start: 2023-01-11 | End: 2023-01-11

## 2023-01-11 RX ORDER — ONDANSETRON 2 MG/ML
4 INJECTION INTRAMUSCULAR; INTRAVENOUS ONCE
Status: COMPLETED | OUTPATIENT
Start: 2023-01-11 | End: 2023-01-11

## 2023-01-11 RX ADMIN — ONDANSETRON 4 MG: 2 INJECTION INTRAMUSCULAR; INTRAVENOUS at 07:46

## 2023-01-11 RX ADMIN — MECLIZINE 25 MG: 12.5 TABLET ORAL at 07:01

## 2023-01-11 RX ADMIN — SODIUM CHLORIDE 1000 ML: 0.9 INJECTION, SOLUTION INTRAVENOUS at 07:03

## 2023-01-11 RX ADMIN — ACETAMINOPHEN 975 MG: 325 TABLET, FILM COATED ORAL at 07:00

## 2023-01-11 NOTE — Clinical Note
Norma Celaya was seen and treated in our emergency department on 1/11/2023  Diagnosis:     Muriel Eli  is off the rest of the shift today, may return to work on return date  He may return on this date: 01/16/2023         If you have any questions or concerns, please don't hesitate to call        Rusty Lima    ______________________________           _______________          _______________  Hospital Representative                              Date                                Time

## 2023-01-11 NOTE — ED PROVIDER NOTES
History  Chief Complaint   Patient presents with   • Vertigo - Recurrent     Pt c/o dizziness for the past week, pt already seen twice for the same CC in the past week     Patient is a 81-year-old male with PMH of anxiety, low back pain with sciatica, and sarcoidosis presenting for evaluation of dizziness  Patient notes that for approximately 10 years he has had "almost daily" dizziness that he endorses as lightheadedness and also at times a "room spinning" sensation  He notes in the past 8 days he has had an acute worsening of his dizziness  He notes 8 days ago having an episode of room spinning sensation and feeling off balance while walking  He then notes 2 days later an episode of left-sided chest pain that radiated to his left shoulder and a syncopal episode at home for which he was evaluated here in the ED  at that time he had a negative cardiac work-up and negative noncontrast CT head  He presented for reevaluation 4 days later (2 days PTA) as he was experiencing ongoing dizziness, a bitemporal throbbing headache, blurred vision, right eye pain and redness, and worsening feeling of imbalance  He was given IV fluids and was noted to have an improvement in his lightheadedness/dizziness  He was given erythromycin ointment for a right acute bacterial conjunctivitis  Patient notes that upon returning home 2 days ago he felt improved  However, starting yesterday he had a return in his headache and worsening of his dizziness  He reports an ongoing bitemporal radiating to the occiput "sore" like headache without blurred vision or double vision  He notes "years" of left lateral eye floaters that are unchanged  He denies fevers but notes that approximately 1 time a month he has fevers of 10 2-1 03 that resolved without intervention  He notes night sweats intermittently times months  He denies facial droop, sore throat, cough, CP/SOB, abdominal pain, V/D, complaints, leg swelling, skin changes    He has a history of working in [address] [place] [address] with exposure to fiberglass  He notes chronic intermittent hemoptysis  no recent falls or head strikes  He notes his right eye redness and blurred vision has not was fully resolved with the ointment prescribed from the prior visit  He endorses chronic low back pain but that it is improved from his last exacerbation 1 month ago; no radicular symptoms  He denies EtOH, recreational drug use  He denies cancer history  History provided by:  Patient   used: No        Prior to Admission Medications   Prescriptions Last Dose Informant Patient Reported? Taking? ALPRAZolam (XANAX) 1 mg tablet   Yes No   Sig: Take 1 mg by mouth 3 (three) times a day   Buprenorphine HCl-Naloxone HCl 12-3 MG FILM   Yes No   Sig: Place 12 mg under the tongue 3 (three) times a day   acetaminophen (TYLENOL) 325 mg tablet   Yes No   Sig: Take 650 mg by mouth every 6 (six) hours as needed for mild pain   calcium carbonate (TUMS) 500 mg chewable tablet   Yes No   Sig: Chew 2 tablets 2 (two) times a day   citalopram (CeleXA) 40 mg tablet   Yes No   Sig: Take 40 mg by mouth daily   pantoprazole (PROTONIX) 40 mg tablet   No No   Sig: Take 1 tablet (40 mg total) by mouth daily   Patient not taking: Reported on 11/12/2020   tobramycin-dexamethasone (TOBRADEX) ophthalmic suspension   No No   Sig: Administer 1 drop to the right eye every 4 (four) hours while awake      Facility-Administered Medications: None       Past Medical History:   Diagnosis Date   • Low back pain with sciatica    • Vertigo        Past Surgical History:   Procedure Laterality Date   • ESOPHAGOGASTRODUODENOSCOPY N/A    • KNEE ARTHROSCOPY Right    • OR BX/EXC LYMPH NODE OPEN SUPERFICIAL Left 1/24/2017    Procedure: INGUINAL LYMPH NODE BIOPSY WITH FLOW CYTOMETRY;  Surgeon: Nargis Eagle DO;  Location: [facility];  Service: General       History reviewed  No pertinent family history    I have reviewed and agree with the history as documented  E-Cigarette/Vaping     E-Cigarette/Vaping Substances   • Nicotine Yes      Social History     Tobacco Use   • Smoking status: Former     Packs/day: 1 00     Years: 25 00     Pack years: 25 00     Types: Cigarettes   • Smokeless tobacco: Current   • Tobacco comments:     Vape   Substance Use Topics   • Alcohol use: No   • Drug use: No       Review of Systems   Constitutional: Positive for activity change (Reports inability to work at his job given he climbs ladders and is concerned for lightheadedness and dizziness that may cause a fall), appetite change (Decreased) and fever (once per month at nights; no new fever in the last week)  Negative for unexpected weight change  HENT: Positive for tinnitus (Intermittent left-sided times years)  Negative for congestion, dental problem, ear discharge, ear pain, facial swelling, hearing loss, rhinorrhea, sore throat, trouble swallowing and voice change  Eyes: Positive for visual disturbance (Left lateral floaters times years per patient)  Negative for photophobia, pain, discharge, redness and itching  Respiratory: Negative for cough (Intermittent hemoptysis, no cough in the last week) and shortness of breath  Cardiovascular: Negative for chest pain, palpitations and leg swelling  Gastrointestinal: Positive for nausea  Negative for abdominal pain, blood in stool, constipation, diarrhea and vomiting  Genitourinary: Negative for decreased urine volume, difficulty urinating, dysuria, flank pain, frequency and urgency  Musculoskeletal: Positive for gait problem (Reports intermittent unsteady gait)  Negative for arthralgias, back pain (Chronic low back pain, no acute exacerbation currently) and joint swelling  Skin: Negative for color change, rash and wound  Allergic/Immunologic: Negative for immunocompromised state     Neurological: Positive for dizziness, tremors ("Shaking" of face and head at baseline times many years per patient), light-headedness and headaches  Negative for seizures, syncope (1 week PTA, no repeat episodes), facial asymmetry, speech difficulty and numbness  Psychiatric/Behavioral: The patient is nervous/anxious  Physical Exam  Physical Exam  Vitals and nursing note reviewed  Constitutional:       General: He is in acute distress (Evidencing mild distress)  Appearance: Normal appearance  He is well-developed and normal weight  He is not ill-appearing, toxic-appearing or diaphoretic  HENT:      Head: Normocephalic and atraumatic  Jaw: There is normal jaw occlusion  No trismus, swelling, pain on movement or malocclusion  Right Ear: Tympanic membrane, ear canal and external ear normal  No mastoid tenderness  Left Ear: Tympanic membrane, ear canal and external ear normal  No mastoid tenderness  Nose: Nose normal  No congestion or rhinorrhea  Mouth/Throat:      Lips: Pink  No lesions  Mouth: Mucous membranes are moist       Pharynx: Oropharynx is clear  Uvula midline  No pharyngeal swelling, oropharyngeal exudate, posterior oropharyngeal erythema or uvula swelling  Tonsils: No tonsillar exudate or tonsillar abscesses  Eyes:      General: Lids are normal  Vision grossly intact  Gaze aligned appropriately  No visual field deficit  Right eye: No discharge  Left eye: No discharge  Extraocular Movements: Extraocular movements intact  Right eye: Nystagmus (Right-sided) present  Normal extraocular motion  Left eye: Nystagmus (Right-sided) present  Normal extraocular motion  Conjunctiva/sclera: Conjunctivae normal       Right eye: Right conjunctiva is not injected  Left eye: Left conjunctiva is not injected  Pupils: Pupils are equal, round, and reactive to light  Neck:      Trachea: Trachea and phonation normal  No abnormal tracheal secretions  Cardiovascular:      Rate and Rhythm: Normal rate and regular rhythm        Pulses: Normal pulses  Radial pulses are 2+ on the right side and 2+ on the left side  Dorsalis pedis pulses are 2+ on the right side and 2+ on the left side  Heart sounds: Normal heart sounds, S1 normal and S2 normal  No murmur heard  Pulmonary:      Effort: Pulmonary effort is normal  No tachypnea or respiratory distress  Breath sounds: Normal breath sounds and air entry  No stridor, decreased air movement or transmitted upper airway sounds  No decreased breath sounds  Abdominal:      General: There is no distension  Palpations: Abdomen is soft  Tenderness: There is no abdominal tenderness  There is no guarding or rebound  Musculoskeletal:         General: Normal range of motion  Cervical back: Full passive range of motion without pain, normal range of motion and neck supple  No edema, erythema, signs of trauma or rigidity  No pain with movement, spinous process tenderness or muscular tenderness  Normal range of motion  Right lower leg: No edema  Left lower leg: No edema  Comments: HALLMAN, 5/5 strength throughout, sensation intact, no focal joint swelling, no lower extremity swelling, ambulatory steady gait   Lymphadenopathy:      Cervical: No cervical adenopathy  Skin:     General: Skin is warm and dry  Capillary Refill: Capillary refill takes less than 2 seconds  Findings: No rash or wound  Neurological:      General: No focal deficit present  Mental Status: He is alert and oriented to person, place, and time  Mental status is at baseline  GCS: GCS eye subscore is 4  GCS verbal subscore is 5  GCS motor subscore is 6  Cranial Nerves: No cranial nerve deficit, dysarthria or facial asymmetry  Sensory: No sensory deficit  Motor: Tremor (mild tremor of b/l hands and head) present  No weakness, atrophy, abnormal muscle tone or pronator drift  Coordination: Coordination is intact  Romberg sign negative   Coordination normal  Finger-Nose-Finger Test and Heel to [facility] Test normal  Rapid alternating movements normal       Gait: Gait is intact  Deep Tendon Reflexes: Reflexes are normal and symmetric           Vital Signs  ED Triage Vitals   Temperature Pulse Respirations Blood Pressure SpO2   01/11/23 0605 01/11/23 0605 01/11/23 0605 01/11/23 0605 01/11/23 0605   98 °F (36 7 °C) 82 20 125/69 98 %      Temp Source Heart Rate Source Patient Position - Orthostatic VS BP Location FiO2 (%)   01/11/23 0605 01/11/23 0605 01/11/23 0605 01/11/23 0605 --   Oral Monitor Sitting Right arm       Pain Score       01/11/23 0623       5           Vitals:    01/11/23 0623 01/11/23 0626 01/11/23 0628 01/11/23 0630   BP: 129/72 114/69 122/84 113/70   Pulse: 73 68 78 72   Patient Position - Orthostatic VS: Lying - Orthostatic VS Sitting - Orthostatic VS Standing for 3 minutes - Orthostatic VS          Visual Acuity      ED Medications  Medications   sodium chloride 0 9 % bolus 1,000 mL (1,000 mL Intravenous New Bag 1/11/23 0703)   meclizine (ANTIVERT) tablet 25 mg (25 mg Oral Given 1/11/23 0701)   acetaminophen (TYLENOL) tablet 975 mg (975 mg Oral Given 1/11/23 0700)   ondansetron (ZOFRAN) injection 4 mg (4 mg Intravenous Given 1/11/23 0746)       Diagnostic Studies  Results Reviewed     Procedure Component Value Units Date/Time    HS Troponin 0hr (reflex protocol) [[id number]]  (Normal) Collected: 01/11/23 0650    Lab Status: Final result Specimen: Blood from Arm, Left Updated: 01/11/23 0730     hs TnI 0hr 2 ng/L     Basic metabolic panel [[id number]] Collected: 01/11/23 0650    Lab Status: Final result Specimen: Blood from Arm, Left Updated: 01/11/23 0720     Sodium 137 mmol/L      Potassium 4 0 mmol/L      Chloride 105 mmol/L      CO2 28 mmol/L      ANION GAP 4 mmol/L      BUN 8 mg/dL      Creatinine 0 88 mg/dL      Glucose 102 mg/dL      Calcium 8 9 mg/dL      eGFR 103 ml/min/1 73sq m     Narrative:      [place] guidelines for Chronic Kidney Disease (CKD):   •  Stage 1 with normal or high GFR (GFR > 90 mL/min/1 73 square meters)  •  Stage 2 Mild CKD (GFR = 60-89 mL/min/1 73 square meters)  •  Stage 3A Moderate CKD (GFR = 45-59 mL/min/1 73 square meters)  •  Stage 3B Moderate CKD (GFR = 30-44 mL/min/1 73 square meters)  •  Stage 4 Severe CKD (GFR = 15-29 mL/min/1 73 square meters)  •  Stage 5 End Stage CKD (GFR <15 mL/min/1 73 square meters)  Note: GFR calculation is accurate only with a steady state creatinine    Magnesium [[id number]]  (Normal) Collected: 01/11/23 0650    Lab Status: Final result Specimen: Blood from Arm, Left Updated: 01/11/23 0720     Magnesium 2 0 mg/dL     CBC and differential [[id number]] Collected: 01/11/23 0650    Lab Status: Final result Specimen: Blood from Arm, Left Updated: 01/11/23 0707     WBC 4 96 Thousand/uL      RBC 5 21 Million/uL      Hemoglobin 15 2 g/dL      Hematocrit 44 7 %      MCV 86 fL      MCH 29 2 pg      MCHC 34 0 g/dL      RDW 12 2 %      MPV 10 8 fL      Platelets 922 Thousands/uL      nRBC 0 /100 WBCs      Neutrophils Relative 60 %      Immat GRANS % 0 %      Lymphocytes Relative 31 %      Monocytes Relative 5 %      Eosinophils Relative 3 %      Basophils Relative 1 %      Neutrophils Absolute 2 96 Thousands/µL      Immature Grans Absolute 0 02 Thousand/uL      Lymphocytes Absolute 1 55 Thousands/µL      Monocytes Absolute 0 27 Thousand/µL      Eosinophils Absolute 0 13 Thousand/µL      Basophils Absolute 0 03 Thousands/µL                  XR chest 1 view portable   ED Interpretation by MARVIN Avila (01/11 7997)   No acute cardiopulmonary disease identified by me  Procedures  ECG 12 Lead Documentation Only    Date/Time: 1/11/2023 7:00 AM  Performed by: Karan Avila  Authorized by:  MARVIN Avila     Indications / Diagnosis:  Dizziness  ECG reviewed by me, the ED Provider: yes    Patient location:  ED  Previous ECG:     Previous ECG:  Compared to current Comparison ECG info:  January 9, 2023    Similarity:  Changes noted (Sinus bradycardia, prior normal sinus rhythm)    Comparison to cardiac monitor: Yes    Interpretation:     Interpretation: normal    Rate:     ECG rate:  58    ECG rate assessment: bradycardic    Rhythm:     Rhythm: sinus bradycardia    Ectopy:     Ectopy: none    QRS:     QRS axis:  Normal  Conduction:     Conduction: normal    ST segments:     ST segments:  Normal  T waves:     T waves: normal    Comments:      Sinus bradycardia, normal axis, normal no acute ischemic changes read by me             ED Course  ED Course as of 01/11/23 0824   Wed Jan 11, 2023   0613 Vital signs from triage stable   0712 CBC and differential  No leukocytosis to suggest acute bacterial infection, no gross anemia   0713 CBC and differential  No change from measure 2 days ago as well as measure prior to that   4760 Basic metabolic panel  Metabolic abnormality, no JOSSUE   0731 Magnesium  Within defined limits   0731 HS Troponin 0hr (reflex protocol)  No chest pain  Dizziness x1 week, will DC repeat 2 and 4-hour troponins  4467 Patient updated on CBC, BMP, mag, troponin, ED wet read x-ray, and EKG results  Patient notes mild improvement in his overall feeling of lightheadedness  He endorses ongoing nausea for which I will order IV Zofran  He is currently receiving his IV fluid bolus  Given the chronicity of his symptoms, reassuring neurologic exam, and stable labs/x-ray/EKG results, plan made for discharge to home with referral to neurology for further evaluation  Patient instructed to follow-up with primary care as well for reevaluation given I will be prescribing him meclizine and Zofran for symptoms and I want him to be reevaluated the next week  Patient agreeable with that plan  His vital signs have remained stable    He is well-appearing overall and with no acute distress   0819 DC paperwork reviewed with emphasis on follow-up with ENT, neurology, and primary care provider  I educated the patient on the use of the new medications of meclizine and Zofran  Patient with clinical improvement in his headache as well as his lightheadedness  His vital signs have remained stable while observed  He is amatory with steady gait  His neurologic exam is unchanged from initial exam   My suspicion for central cause to his symptoms is low, however I am sending him to neurology given the chronicity of symptoms and with negative work-up thus far in the ED including CT head and CTA head and neck  He is agreeable to that plan and has no further questions at this time  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, I will write for a work note for the next 3 to 5 days so that he may see the specialist prior to returning to work to prevent falls and further injury  He has no further questions at this time  Patient with improved appearance, no acute distress, nontoxic, and ambulatory with steady gait at time of discharge  HEART Risk Score    Flowsheet Row Most Recent Value   Heart Score Risk Calculator    History 0 Filed at: 01/11/2023 0745   ECG --   Age --   Risk Factors --   Troponin --   HEART Score --                                      Medical Decision Making  DDx including but not limited to: Orthostatic hypotension, presyncope, metabolic abnormality, cardiac abnormality, anxiety, adverse reaction, benign paroxysmal vertigo, vestibular neuritis, disequilibrium; doubt infectious process; doubt acute intracranial process given stable CT head and CTA head and neck imaging from the last 2 visits in the past 7 days        Lightheadedness: acute illness or injury  Amount and/or Complexity of Data Reviewed  Labs: ordered  Decision-making details documented in ED Course  Radiology: ordered and independent interpretation performed  Risk  OTC drugs  Prescription drug management            Disposition  Final diagnoses:   Lightheadedness   Dizziness     Time reflects when diagnosis was documented in both MDM as applicable and the Disposition within this note     Time User Action Codes Description Comment    1/11/2023  7:44 AM Catheline Frame Add [R42] Lightheadedness     1/11/2023  8:23 AM Catheline Frame Add [R42] Dizziness       ED Disposition     ED Disposition   Discharge    Condition   Stable    Date/Time   Wed Jan 11, 2023  7:44 AM    Comment   Aimee Bright discharge to home/self care                 Follow-up Information     Follow up With Specialties Details Why Contact Info Additional Information    Maura Plaza DO Family Medicine Schedule an appointment as soon as possible for a visit in 2 days  [address] Iliana Salgado [address] [place] [address] Neurology [address] [facility] Neurology Call today  [address] [facility] Neurology [address] [place] [address], [address] [facility], [address] [place], [place], [place], [facility] ENT [address] Allergy Call today  [address]  [place] [facility] [address] [address]  [address] ENT [address], [address], [address] [place], [place], [address], [phone]          Patient's Medications   Discharge Prescriptions    MECLIZINE (ANTIVERT) 25 MG TABLET    Take 1 tablet (25 mg total) by mouth 3 (three) times a day as needed for dizziness       Start Date: 1/11/2023 End Date: --       Order Dose: 25 mg       Quantity: 30 tablet    Refills: 0    ONDANSETRON (ZOFRAN) 4 MG TABLET    Take 1 tablet (4 mg total) by mouth every 6 (six) hours       Start Date: 1/11/2023 End Date: --       Order Dose: 4 mg       Quantity: 12 tablet    Refills: 0           PDMP Review       Value Time User    PDMP Reviewed  Yes 1/11/2023  5:51 AM Hollie Faust MD          ED Provider  Electronically Signed by           Karan Giordano  01/11/23 9105

## 2023-01-11 NOTE — DISCHARGE INSTRUCTIONS
I have placed referrals for ENT and neurology follow-up  I have also included the local offices so they may call and schedule an appointment  Use the prescribed meclizine up to 3 times daily for feeling of lightheadedness and dizziness  Use the prescribed Zofran as needed for nausea and vomiting  Increase your fluids to maintain your hydration  Return to the ER if you develop persistent fever, worsening headache, vision changes, stiff neck, occultly breathing, chest pain, or numbness/tingling

## 2023-01-15 ENCOUNTER — APPOINTMENT (EMERGENCY)
Dept: RADIOLOGY | Facility: HOSPITAL | Age: 48
End: 2023-01-15

## 2023-01-15 ENCOUNTER — APPOINTMENT (EMERGENCY)
Dept: CT IMAGING | Facility: HOSPITAL | Age: 48
End: 2023-01-15

## 2023-01-15 ENCOUNTER — HOSPITAL ENCOUNTER (EMERGENCY)
Facility: HOSPITAL | Age: 48
Discharge: HOME | End: 2023-01-15
Attending: EMERGENCY MEDICINE

## 2023-01-15 VITALS
RESPIRATION RATE: 18 BRPM | DIASTOLIC BLOOD PRESSURE: 77 MMHG | SYSTOLIC BLOOD PRESSURE: 117 MMHG | TEMPERATURE: 98.4 F | OXYGEN SATURATION: 98 % | HEART RATE: 57 BPM

## 2023-01-15 DIAGNOSIS — R42 DIZZINESS: Primary | ICD-10-CM

## 2023-01-15 DIAGNOSIS — S01.81XA LACERATION OF FOREHEAD, INITIAL ENCOUNTER: ICD-10-CM

## 2023-01-15 DIAGNOSIS — R42 LIGHTHEADEDNESS: ICD-10-CM

## 2023-01-15 DIAGNOSIS — S00.83XA CONTUSION OF FOREHEAD, INITIAL ENCOUNTER: ICD-10-CM

## 2023-01-15 DIAGNOSIS — J90 PLEURAL EFFUSION: ICD-10-CM

## 2023-01-15 DIAGNOSIS — R74.01 TRANSAMINITIS: ICD-10-CM

## 2023-01-15 LAB
ALBUMIN SERPL BCP-MCNC: 3.9 G/DL (ref 3.5–5)
ALP SERPL-CCNC: 85 U/L (ref 34–104)
ALT SERPL W P-5'-P-CCNC: 126 U/L (ref 7–52)
ANION GAP SERPL CALCULATED.3IONS-SCNC: 5 MMOL/L (ref 4–13)
APTT PPP: 32 SECONDS (ref 23–37)
AST SERPL W P-5'-P-CCNC: 91 U/L (ref 13–39)
BASOPHILS # BLD AUTO: 0.03 THOUSANDS/ÂΜL (ref 0–0.1)
BASOPHILS NFR BLD AUTO: 1 % (ref 0–1)
BILIRUB SERPL-MCNC: 0.44 MG/DL (ref 0.2–1)
BUN SERPL-MCNC: 10 MG/DL (ref 5–25)
CALCIUM SERPL-MCNC: 8.9 MG/DL (ref 8.4–10.2)
CARDIAC TROPONIN I PNL SERPL HS: 2 NG/L
CHLORIDE SERPL-SCNC: 105 MMOL/L (ref 96–108)
CO2 SERPL-SCNC: 29 MMOL/L (ref 21–32)
CREAT SERPL-MCNC: 0.83 MG/DL (ref 0.6–1.3)
D DIMER PPP FEU-MCNC: 0.54 UG/ML FEU
EOSINOPHIL # BLD AUTO: 0.12 THOUSAND/ÂΜL (ref 0–0.61)
EOSINOPHIL NFR BLD AUTO: 2 % (ref 0–6)
ERYTHROCYTE [DISTWIDTH] IN BLOOD BY AUTOMATED COUNT: 12.5 % (ref 11.6–15.1)
GFR SERPL CREATININE-BSD FRML MDRD: 106 ML/MIN/1.73SQ M
GLUCOSE SERPL-MCNC: 90 MG/DL (ref 65–140)
HCT VFR BLD AUTO: 42.7 % (ref 36.5–49.3)
HGB BLD-MCNC: 14.2 G/DL (ref 12–17)
IMM GRANULOCYTES # BLD AUTO: 0.03 THOUSAND/UL (ref 0–0.2)
IMM GRANULOCYTES NFR BLD AUTO: 1 % (ref 0–2)
INR PPP: 1.09 (ref 0.84–1.19)
LYMPHOCYTES # BLD AUTO: 1.92 THOUSANDS/ÂΜL (ref 0.6–4.47)
LYMPHOCYTES NFR BLD AUTO: 34 % (ref 14–44)
MCH RBC QN AUTO: 29 PG (ref 26.8–34.3)
MCHC RBC AUTO-ENTMCNC: 33.3 G/DL (ref 31.4–37.4)
MCV RBC AUTO: 87 FL (ref 82–98)
MONOCYTES # BLD AUTO: 0.36 THOUSAND/ÂΜL (ref 0.17–1.22)
MONOCYTES NFR BLD AUTO: 6 % (ref 4–12)
NEUTROPHILS # BLD AUTO: 3.18 THOUSANDS/ÂΜL (ref 1.85–7.62)
NEUTS SEG NFR BLD AUTO: 56 % (ref 43–75)
NRBC BLD AUTO-RTO: 0 /100 WBCS
PLATELET # BLD AUTO: 197 THOUSANDS/UL (ref 149–390)
PMV BLD AUTO: 10.1 FL (ref 8.9–12.7)
POTASSIUM SERPL-SCNC: 3.6 MMOL/L (ref 3.5–5.3)
PROT SERPL-MCNC: 7 G/DL (ref 6.4–8.4)
PROTHROMBIN TIME: 14.3 SECONDS (ref 11.6–14.5)
RBC # BLD AUTO: 4.89 MILLION/UL (ref 3.88–5.62)
SODIUM SERPL-SCNC: 139 MMOL/L (ref 135–147)
WBC # BLD AUTO: 5.64 THOUSAND/UL (ref 4.31–10.16)

## 2023-01-15 RX ORDER — MECLIZINE HCL 12.5 MG/1
25 TABLET ORAL ONCE
Status: COMPLETED | OUTPATIENT
Start: 2023-01-15 | End: 2023-01-15

## 2023-01-15 RX ORDER — KETOROLAC TROMETHAMINE 30 MG/ML
10 INJECTION, SOLUTION INTRAMUSCULAR; INTRAVENOUS ONCE
Status: COMPLETED | OUTPATIENT
Start: 2023-01-15 | End: 2023-01-15

## 2023-01-15 RX ORDER — ACETAMINOPHEN 325 MG/1
975 TABLET ORAL ONCE
Status: COMPLETED | OUTPATIENT
Start: 2023-01-15 | End: 2023-01-15

## 2023-01-15 RX ADMIN — SODIUM CHLORIDE 1000 ML: 0.9 INJECTION, SOLUTION INTRAVENOUS at 19:58

## 2023-01-15 RX ADMIN — KETOROLAC TROMETHAMINE 9.9 MG: 30 INJECTION, SOLUTION INTRAMUSCULAR; INTRAVENOUS at 23:00

## 2023-01-15 RX ADMIN — IOHEXOL 85 ML: 350 INJECTION, SOLUTION INTRAVENOUS at 21:47

## 2023-01-15 RX ADMIN — ACETAMINOPHEN 975 MG: 325 TABLET, FILM COATED ORAL at 21:57

## 2023-01-15 RX ADMIN — MECLIZINE 25 MG: 12.5 TABLET ORAL at 23:28

## 2023-01-15 NOTE — Clinical Note
Dolores Fletcher was seen and treated in our emergency department on 1/15/2023  No work until cleared by Family Doctor/Orthopedics        Diagnosis:     Benedict Mclean    He may return on this date: If you have any questions or concerns, please don't hesitate to call        Valerie Coley MD    ______________________________           _______________          _______________  Hospital Representative                              Date                                Time

## 2023-01-15 NOTE — Clinical Note
Jak Barrientos was seen and treated in our emergency department on 1/15/2023  No work until cleared by Family Doctor/Orthopedics        Diagnosis:     Corrie Newberry    He may return on this date: If you have any questions or concerns, please don't hesitate to call        Brad Maciel MD    ______________________________           _______________          _______________  Hospital Representative                              Date                                Time

## 2023-01-15 NOTE — Clinical Note
Stan Heaton was seen and treated in our emergency department on 1/15/2023  No work until cleared by Family Doctor/Orthopedics        Diagnosis:     Nanette Moon    He may return on this date: If you have any questions or concerns, please don't hesitate to call        Francesca Alvarez MD    ______________________________           _______________          _______________  Hospital Representative                              Date                                Time

## 2023-01-15 NOTE — Clinical Note
Yesenia Park was seen and treated in our emergency department on 1/15/2023  No work until cleared by Family Doctor/Orthopedics        Diagnosis:     Reuben Arauz    He may return on this date: If you have any questions or concerns, please don't hesitate to call        Judith Hartley MD    ______________________________           _______________          _______________  Hospital Representative                              Date                                Time

## 2023-01-16 LAB
ATRIAL RATE: 84 BPM
P AXIS: 51 DEGREES
PR INTERVAL: 142 MS
QRS AXIS: 51 DEGREES
QRSD INTERVAL: 94 MS
QT INTERVAL: 396 MS
QTC INTERVAL: 467 MS
T WAVE AXIS: 48 DEGREES
VENTRICULAR RATE: 84 BPM

## 2023-01-16 NOTE — ED PROVIDER NOTES
History  Chief Complaint   Patient presents with   • Dizziness     Pt reports ongoing dizziness and unsteady gait  Pt states to have been diagnosed with vertigo and has been having frequent falls  Pt states to have gotten dizzy and lost balance and hit head on cabinet around noon today  Pt presents with small laceration to forehead  Pt reports neck pain  Pt denies LOC, denies blood thinners  Pt reports taking antivert after hitting head without relief  HPI    Prior to Admission Medications   Prescriptions Last Dose Informant Patient Reported? Taking?    ALPRAZolam (XANAX) 1 mg tablet   Yes No   Sig: Take 1 mg by mouth 3 (three) times a day   Buprenorphine HCl-Naloxone HCl 12-3 MG FILM   Yes No   Sig: Place 12 mg under the tongue 3 (three) times a day   acetaminophen (TYLENOL) 325 mg tablet   Yes No   Sig: Take 650 mg by mouth every 6 (six) hours as needed for mild pain   calcium carbonate (TUMS) 500 mg chewable tablet   Yes No   Sig: Chew 2 tablets 2 (two) times a day   citalopram (CeleXA) 40 mg tablet   Yes No   Sig: Take 40 mg by mouth daily   meclizine (ANTIVERT) 25 mg tablet   No No   Sig: Take 1 tablet (25 mg total) by mouth 3 (three) times a day as needed for dizziness   ondansetron (ZOFRAN) 4 mg tablet   No No   Sig: Take 1 tablet (4 mg total) by mouth every 6 (six) hours   pantoprazole (PROTONIX) 40 mg tablet   No No   Sig: Take 1 tablet (40 mg total) by mouth daily   Patient not taking: Reported on 11/12/2020   tobramycin-dexamethasone (TOBRADEX) ophthalmic suspension   No No   Sig: Administer 1 drop to the right eye every 4 (four) hours while awake      Facility-Administered Medications: None       Past Medical History:   Diagnosis Date   • Low back pain with sciatica    • Vertigo        Past Surgical History:   Procedure Laterality Date   • ESOPHAGOGASTRODUODENOSCOPY N/A    • KNEE ARTHROSCOPY Right    • MA BX/EXC LYMPH NODE OPEN SUPERFICIAL Left 1/24/2017    Procedure: INGUINAL LYMPH NODE BIOPSY WITH FLOW CYTOMETRY;  Surgeon: Staci Walker DO;  Location: [facility];  Service: General       History reviewed  No pertinent family history  I have reviewed and agree with the history as documented  E-Cigarette/Vaping     E-Cigarette/Vaping Substances   • Nicotine Yes      Social History     Tobacco Use   • Smoking status: Former     Packs/day: 1 00     Years: 25 00     Pack years: 25 00     Types: Cigarettes   • Smokeless tobacco: Current   • Tobacco comments:     Vape   Substance Use Topics   • Alcohol use: No   • Drug use: No       Review of Systems    Physical Exam  Physical Exam    Vital Signs  ED Triage Vitals [01/15/23 1858]   Temperature Pulse Respirations Blood Pressure SpO2   98 4 °F (36 9 °C) 89 18 129/84 98 %      Temp Source Heart Rate Source Patient Position - Orthostatic VS BP Location FiO2 (%)   Oral Monitor Lying Right arm --      Pain Score       --           Vitals:    01/15/23 1858   BP: 129/84   Pulse: 89   Patient Position - Orthostatic VS: Lying         Visual Acuity      ED Medications  Medications - No data to display    Diagnostic Studies  Results Reviewed     None                 No orders to display              Procedures  ECG 12 Lead Documentation Only    Date/Time: 1/15/2023 7:15 PM  Performed by: Brad Maciel MD  Authorized by: Brad Maciel MD     ECG reviewed by me, the ED Provider: yes    Patient location:  ED  Previous ECG:     Previous ECG:  Compared to current    Comparison ECG info:  1/11/2023 -sinus bradycardia on prior  No change in morphology    Rate:     ECG rate:  84    ECG rate assessment: normal    Rhythm:     Rhythm: sinus rhythm    QRS:     QRS axis:  Normal    QRS intervals:  Normal  Conduction:     Conduction: normal    ST segments:     ST segments:  Normal  T waves:     T waves: normal               ED Course  ED Course as of 01/15/23 2239   Milton Ross Mihai 15, 2023   2050 Patient does have some chronic LFT elevation, slightly higher today                               SBIRT 22yo+    Flowsheet Row Most Recent Value   SBIRT (23 yo +)    In order to provide better care to our patients, we are screening all of our patients for alcohol and drug use  Would it be okay to ask you these screening questions? Unable to answer at this time Filed at: 01/15/2023 1900                    MDM    Disposition  Final diagnoses:   None     ED Disposition     None      Follow-up Information    None         Patient's Medications   Discharge Prescriptions    No medications on file       No discharge procedures on file      PDMP Review       Value Time User    PDMP Reviewed  Yes 1/11/2023  5:51 AM Nolan Levy MD          ED Provider  Electronically Signed by Pupils: Pupils are equal, round, and reactive to light  Cardiovascular:      Rate and Rhythm: Normal rate and regular rhythm  Pulses: Normal pulses  Heart sounds: Normal heart sounds  Pulmonary:      Effort: Pulmonary effort is normal       Breath sounds: Normal breath sounds  Abdominal:      Palpations: Abdomen is soft  Tenderness: There is no abdominal tenderness  Musculoskeletal:         General: Normal range of motion  Cervical back: Normal range of motion  Tenderness (mild midline & lateral upper neck tn ) present  Skin:     General: Skin is warm and dry  Neurological:      Mental Status: He is alert  Comments: Clear speech  No facial asymmetry/ deficit appreciated  Pupils briskly reactive to light  EOMI  No nystagmus  Strong eye closure & symmetric brow raise  Ability to insufflate cheeks, protrude tongue midline & range this laterally  Symmetric/ intact sensation in the upper, mid & lower portions of the face  5/5 strength on head turn, shoulder shrug, bicep, tricep & deltoid movement against resistance b/l   5/5 strength on b/l hand , pincer grasp, finger abduction, dorsi & volar flexion, hip flexion, dorsi & plantar flexion  Symmetric grossly intact sensation in the UE & LE   + Rhomberg  Steady gait  Difficulty w/ heel, toe & tandem walking  No dysmetria           Vital Signs  ED Triage Vitals   Temperature Pulse Respirations Blood Pressure SpO2   01/15/23 1858 01/15/23 1858 01/15/23 1858 01/15/23 1858 01/15/23 1858   98 4 °F (36 9 °C) 89 18 129/84 98 %      Temp Source Heart Rate Source Patient Position - Orthostatic VS BP Location FiO2 (%)   01/15/23 1858 01/15/23 1858 01/15/23 1858 01/15/23 1858 --   Oral Monitor Lying Right arm       Pain Score       01/15/23 2157       8           Vitals:    01/15/23 2211 01/15/23 2213 01/15/23 2214 01/15/23 2215   BP: 129/76 125/81 123/78 117/77   Pulse: (!) 54 78 64 57   Patient Position - Orthostatic VS: Lying - Orthostatic VS Sitting - Orthostatic VS Standing - Orthostatic VS Standing for 3 minutes - Orthostatic VS         Visual Acuity  Visual Acuity    Flowsheet Row Most Recent Value   L Pupil Size (mm) 3   R Pupil Size (mm) 3          ED Medications  Medications   sodium chloride 0 9 % bolus 1,000 mL (0 mL Intravenous Stopped 1/15/23 2211)   acetaminophen (TYLENOL) tablet 975 mg (975 mg Oral Given 1/15/23 2157)   iohexol (OMNIPAQUE) 350 MG/ML injection (SINGLE-DOSE) 85 mL (85 mL Intravenous Given 1/15/23 2147)   ketorolac (TORADOL) injection 9 9 mg (9 9 mg Intravenous Given 1/15/23 2300)   meclizine (ANTIVERT) tablet 25 mg (25 mg Oral Given 1/15/23 2328)       Diagnostic Studies  Results Reviewed     Procedure Component Value Units Date/Time    HS Troponin 0hr (reflex protocol) [[id number]]  (Normal) Collected: 01/15/23 1958    Lab Status: Final result Specimen: Blood from Arm, Left Updated: 01/15/23 2044     hs TnI 0hr 2 ng/L     D-Dimer [[id number]]  (Abnormal) Collected: 01/15/23 1958    Lab Status: Final result Specimen: Blood from Arm, Left Updated: 01/15/23 2040     D-Dimer, Quant 0 54 ug/ml FEU     Comprehensive metabolic panel [[id number]]  (Abnormal) Collected: 01/15/23 1958    Lab Status: Final result Specimen: Blood from Arm, Left Updated: 01/15/23 2038     Sodium 139 mmol/L      Potassium 3 6 mmol/L      Chloride 105 mmol/L      CO2 29 mmol/L      ANION GAP 5 mmol/L      BUN 10 mg/dL      Creatinine 0 83 mg/dL      Glucose 90 mg/dL      Calcium 8 9 mg/dL      AST 91 U/L       U/L      Alkaline Phosphatase 85 U/L      Total Protein 7 0 g/dL      Albumin 3 9 g/dL      Total Bilirubin 0 44 mg/dL      eGFR 106 ml/min/1 73sq m     Narrative:      Jammie guidelines for Chronic Kidney Disease (CKD):   •  Stage 1 with normal or high GFR (GFR > 90 mL/min/1 73 square meters)  •  Stage 2 Mild CKD (GFR = 60-89 mL/min/1 73 square meters)  •  Stage 3A Moderate CKD (GFR = 45-59 mL/min/1 73 square meters)  •  Stage 3B Moderate CKD (GFR = 30-44 mL/min/1 73 square meters)  •  Stage 4 Severe CKD (GFR = 15-29 mL/min/1 73 square meters)  •  Stage 5 End Stage CKD (GFR <15 mL/min/1 73 square meters)  Note: GFR calculation is accurate only with a steady state creatinine    Protime-INR [[id number]]  (Normal) Collected: 01/15/23 1958    Lab Status: Final result Specimen: Blood from Arm, Left Updated: 01/15/23 2033     Protime 14 3 seconds      INR 1 09    APTT [[id number]]  (Normal) Collected: 01/15/23 1958    Lab Status: Final result Specimen: Blood from Arm, Left Updated: 01/15/23 2033     PTT 32 seconds     CBC and differential [[id number]] Collected: 01/15/23 1958    Lab Status: Final result Specimen: Blood from Arm, Left Updated: 01/15/23 2007     WBC 5 64 Thousand/uL      RBC 4 89 Million/uL      Hemoglobin 14 2 g/dL      Hematocrit 42 7 %      MCV 87 fL      MCH 29 0 pg      MCHC 33 3 g/dL      RDW 12 5 %      MPV 10 1 fL      Platelets 072 Thousands/uL      nRBC 0 /100 WBCs      Neutrophils Relative 56 %      Immat GRANS % 1 %      Lymphocytes Relative 34 %      Monocytes Relative 6 %      Eosinophils Relative 2 %      Basophils Relative 1 %      Neutrophils Absolute 3 18 Thousands/µL      Immature Grans Absolute 0 03 Thousand/uL      Lymphocytes Absolute 1 92 Thousands/µL      Monocytes Absolute 0 36 Thousand/µL      Eosinophils Absolute 0 12 Thousand/µL      Basophils Absolute 0 03 Thousands/µL                  XR cervical spine 2 or 3 views   ED Interpretation by Elida North MD (01/15 2237)   No fracture or subluxation appreciated  Mild degenerative changes  Final Result by Eliot Pop MD (01/16 4162)      No acute osseous abnormality  Workstation performed: [id number]         CTA ED chest PE study   Final Result by Tanner Mujica MD (01/15 2235)      1  No pulmonary embolism   2    Small right and trace left pleural effusions                  Workstation performed: [id number]         CT head without contrast   Final Result by Ghanshyam Sherman MD (01/15 2025)      No intracranial hemorrhage or calvarial fracture  Workstation performed: [id number]                    Procedures  ECG 12 Lead Documentation Only    Date/Time: 1/15/2023 7:15 PM  Performed by: Oksana Chan MD  Authorized by: Oksana Chan MD     ECG reviewed by me, the ED Provider: yes    Patient location:  ED  Previous ECG:     Previous ECG:  Compared to current    Comparison ECG info:  1/11/2023 -sinus bradycardia on prior  No change in morphology  Rate:     ECG rate:  84    ECG rate assessment: normal    Rhythm:     Rhythm: sinus rhythm    QRS:     QRS axis:  Normal    QRS intervals:  Normal  Conduction:     Conduction: normal    ST segments:     ST segments:  Normal  T waves:     T waves: normal               ED Course  ED Course as of 01/24/23 1415   Sun Mihai 15, 2023   2050 Patient does have some chronic LFT elevation, slightly higher today     Cause of events unclear  He has had extensive ED evaluation & has upcoming Cardiology appt  DDx includes but not limited to intense peripheral vertigo, orthostasis, PE, dysrhythmia, medication side effect  PE excluded  No dysrhythmia while here  Additional eval/ F/U will be needed given severity and increasing frequency of events  Discussed option for further in-hospital evaluation vs  DC & PCP + Neuro F/U  Pt  Very much preferred DC  Demonstrated understanding of S/S for which to return  Intends to try meclizine scheduled  Did review elevated LFTS  Likely Hep C is responsible  Pt  Notes he has multiple tattoos including that were not placed professionally  I am uncertain if this is responsible for his pleural effusions   Pt  Ambulated steadily near time of DC & was HD stable for home                     PERC Rule for PE    Flowsheet Row Most Recent Value   PERC Rule for PE    Age >=50 0 Filed at: 01/15/2023 1940   HR >=100 0 Filed at: 01/15/2023 1940   O2 Sat on room air < 95% 0 Filed at: 01/15/2023 1940   History of PE or DVT 0 Filed at: 01/15/2023 1940   Recent trauma or surgery 0 Filed at: 01/15/2023 1940   Hemoptysis 1 Filed at: 01/15/2023 1940   Exogenous estrogen 0 Filed at: 01/15/2023 1940   Unilateral leg swelling 0 Filed at: 01/15/2023 1940   PERC Rule for PE Results 1 Filed at: 01/15/2023 1940              SBIRT 22yo+    Flowsheet Row Most Recent Value   SBIRT (25 yo +)    In order to provide better care to our patients, we are screening all of our patients for alcohol and drug use  Would it be okay to ask you these screening questions?  Unable to answer at this time Filed at: 01/15/2023 1900          Wells' Criteria for PE    Flowsheet Row Most Recent Value   Wells' Criteria for PE    Clinical signs and symptoms of DVT 0 Filed at: 01/15/2023 1940   PE is primary diagnosis or equally likely 0 Filed at: 01/15/2023 1940   HR >100 0 Filed at: 01/15/2023 1940   Immobilization at least 3 days or Surgery in the previous 4 weeks 0 Filed at: 01/15/2023 1940   Previous, objectively diagnosed PE or DVT 0 Filed at: 01/15/2023 1940   Hemoptysis 1 Filed at: 01/15/2023 1940   Malignancy with treatment within 6 months or palliative 0 Filed at: 01/15/2023 1940   Wells' Criteria Total 1 Filed at: 01/15/2023 1940                MDM    Disposition  Final diagnoses:   Dizziness   Contusion of forehead, initial encounter   Laceration of forehead, initial encounter   Pleural effusion   Transaminitis   Lightheadedness     Time reflects when diagnosis was documented in both MDM as applicable and the Disposition within this note     Time User Action Codes Description Comment    1/15/2023 11:15 PM Augie LOZANO Add [R42] Dizziness     1/15/2023 11:16 PM Augie LOZANO Add [S00 83XA] Contusion of forehead, initial encounter     1/15/2023 11:16 PM Zarefes-Weston, Janith Calandra A Add Juliette Osler Laceration of forehead, initial encounter     1/15/2023 11:17 PM Darcy LOZANO Add [J90] Pleural effusion     1/15/2023 11:17 PM Darcy LOZANO Add [R74 01] Transaminitis     1/15/2023 11:21 PM Darcy Coley Add [R42] [address] [facility]       ED Disposition     ED Disposition   Discharge    Condition   Stable    Date/Time   Sun Mihai 15, 2023 11:15 PM    Comment   Den Left discharge to home/self care                 Follow-up Information     Follow up With Specialties Details Why Contact Info    Kareen Alfonso DO Family Medicine Schedule an appointment as soon as possible for a visit   [facility] [address]  [place] [address] [facility] [address] [facility],[address]  [phone]            Discharge Medication List as of 1/15/2023 11:42 PM      CONTINUE these medications which have NOT CHANGED    Details   acetaminophen (TYLENOL) 325 mg tablet Take 650 mg by mouth every 6 (six) hours as needed for mild pain, Until Discontinued, Historical Med      ALPRAZolam (XANAX) 1 mg tablet Take 1 mg by mouth 3 (three) times a day, Historical Med      Buprenorphine HCl-Naloxone HCl 12-3 MG FILM Place 12 mg under the tongue 3 (three) times a day, Historical Med      calcium carbonate (TUMS) 500 mg chewable tablet Chew 2 tablets 2 (two) times a day, Until Discontinued, Historical Med      citalopram (CeleXA) 40 mg tablet Take 40 mg by mouth daily, Historical Med      meclizine (ANTIVERT) 25 mg tablet Take 1 tablet (25 mg total) by mouth 3 (three) times a day as needed for dizziness, Starting Wed 1/11/2023, Normal      ondansetron (ZOFRAN) 4 mg tablet Take 1 tablet (4 mg total) by mouth every 6 (six) hours, Starting Wed 1/11/2023, Normal      pantoprazole (PROTONIX) 40 mg tablet Take 1 tablet (40 mg total) by mouth daily, Starting Tue 10/6/2020, Normal      tobramycin-dexamethasone (TOBRADEX) ophthalmic suspension Administer 1 drop to the right eye every 4 (four) hours while awake, Starting Mon 1/9/2023, Normal No discharge procedures on file      PDMP Review       Value Time User    PDMP Reviewed  Yes 1/11/2023  5:51 AM Santiago Todd MD          ED Provider  Electronically Signed by           Elida North MD  01/24/23 9995

## 2023-01-23 NOTE — ED PROVIDER NOTES
History  Chief Complaint   Patient presents with   • Dizziness     Pt reports ongoing dizziness and unsteady gait  Pt states to have been diagnosed with vertigo and has been having frequent falls  Pt states to have gotten dizzy and lost balance and hit head on cabinet around noon today  Pt presents with small laceration to forehead  Pt reports neck pain  Pt denies LOC, denies blood thinners  Pt reports taking antivert after hitting head without relief  Patient is a 73-year-old male who presents to the emergency department  That he has had "severe vertigo the past couple of weeks "  He tells me that he is "always unsteady "  He describes having had multiple events with dizziness and feeling that the room is spinning  His vision darkens and he feels like he will fall over  He occasionally vomits and occasionally falls  Today in the morning he felt dizzy and then fell in the bathroom hitting his head on the corner of furniture  He experienced some bleeding from the forehead although denies having had any loss of consciousness  He took meclizine following this though has continued w/ dizzy sensation on movement  He describes increased frequency of events of dizziness each of these lasts a few minutes at a time and he notes these every time he stands  He also notes normal points while seated still  He estimates these events occur 5-7 times a day  He denies having had any chest pain or shortness of breath during these episodes until today  He has discomfort in the left side of the chest radiating to the left arm and not pounding sensation in his heart  He has experienced bilateral leg cramping intermittently over the last couple of years  He has additionally appreciated fevers up to 103 roughly monthly after exertion for several months  (Prior evaluation revealed presence of hepatitis C  Repeat labs intended to have followed  These were not performed and patient did not follow-up with specialist)  Patient has not been able to return to work due to ongoing dizziness  His employer 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  He was seen recently in the emergency department  On January 5 he syncopized  He had not had anything to eat shortly prior to that  Work-up included cardiac evaluation and CT head  No acute findings  On January 9 he underwent evaluation for headaches with blurred vision that had started the day before  He additionally appreciated increased wobbly sensation  CTA was performed-unremarkable  The following day he experienced return of a headache and some dizziness  Has been stable he denies use of any new medications  Prior to Admission Medications   Prescriptions Last Dose Informant Patient Reported? Taking?    ALPRAZolam (XANAX) 1 mg tablet   Yes No   Sig: Take 1 mg by mouth 3 (three) times a day   Buprenorphine HCl-Naloxone HCl 12-3 MG FILM   Yes No   Sig: Place 12 mg under the tongue 3 (three) times a day   acetaminophen (TYLENOL) 325 mg tablet   Yes No   Sig: Take 650 mg by mouth every 6 (six) hours as needed for mild pain   calcium carbonate (TUMS) 500 mg chewable tablet   Yes No   Sig: Chew 2 tablets 2 (two) times a day   citalopram (CeleXA) 40 mg tablet   Yes No   Sig: Take 40 mg by mouth daily   meclizine (ANTIVERT) 25 mg tablet   No No   Sig: Take 1 tablet (25 mg total) by mouth 3 (three) times a day as needed for dizziness   ondansetron (ZOFRAN) 4 mg tablet   No No   Sig: Take 1 tablet (4 mg total) by mouth every 6 (six) hours   pantoprazole (PROTONIX) 40 mg tablet   No No   Sig: Take 1 tablet (40 mg total) by mouth daily   Patient not taking: Reported on 11/12/2020   tobramycin-dexamethasone (TOBRADEX) ophthalmic suspension   No No   Sig: Administer 1 drop to the right eye every 4 (four) hours while awake      Facility-Administered Medications: None       Past Medical History:   Diagnosis Date   • Low back pain with sciatica    • Vertigo        Past Surgical History:   Procedure Laterality Date   • ESOPHAGOGASTRODUODENOSCOPY N/A    • KNEE ARTHROSCOPY Right    • OK BX/EXC LYMPH NODE OPEN SUPERFICIAL Left 1/24/2017    Procedure: INGUINAL LYMPH NODE BIOPSY WITH FLOW CYTOMETRY;  Surgeon: Soledad Khan DO;  Location: [facility];  Service: General       History reviewed  No pertinent family history  I have reviewed and agree with the history as documented  E-Cigarette/Vaping     E-Cigarette/Vaping Substances   • Nicotine Yes      Social History     Tobacco Use   • Smoking status: Former     Packs/day: 1 00     Years: 25 00     Pack years: 25 00     Types: Cigarettes   • Smokeless tobacco: Current   • Tobacco comments:     Vape   Substance Use Topics   • Alcohol use: No   • Drug use: No       Review of Systems   HENT: Positive for tinnitus (intermittent, chronic, unchanged)          Physical Exam  Physical Exam    Vital Signs  ED Triage Vitals   Temperature Pulse Respirations Blood Pressure SpO2   01/15/23 1858 01/15/23 1858 01/15/23 1858 01/15/23 1858 01/15/23 1858   98 4 °F (36 9 °C) 89 18 129/84 98 %      Temp Source Heart Rate Source Patient Position - Orthostatic VS BP Location FiO2 (%)   01/15/23 1858 01/15/23 1858 01/15/23 1858 01/15/23 1858 --   Oral Monitor Lying Right arm       Pain Score       01/15/23 2157       8           Vitals:    01/15/23 2211 01/15/23 2213 01/15/23 2214 01/15/23 2215   BP: 129/76 125/81 123/78 117/77   Pulse: (!) 54 78 64 57   Patient Position - Orthostatic VS: Lying - Orthostatic VS Sitting - Orthostatic VS Standing - Orthostatic VS Standing for 3 minutes - Orthostatic VS         Visual Acuity  Visual Acuity    Flowsheet Row Most Recent Value   L Pupil Size (mm) 3   R Pupil Size (mm) 3          ED Medications  Medications   sodium chloride 0 9 % bolus 1,000 mL (0 mL Intravenous Stopped 1/15/23 2211)   acetaminophen (TYLENOL) tablet 975 mg (975 mg Oral Given 1/15/23 2157)   iohexol (OMNIPAQUE) 350 MG/ML injection (SINGLE-DOSE) 85 mL (85 mL Intravenous Given 1/15/23 2147)   ketorolac (TORADOL) injection 9 9 mg (9 9 mg Intravenous Given 1/15/23 2300)   meclizine (ANTIVERT) tablet 25 mg (25 mg Oral Given 1/15/23 2328)       Diagnostic Studies  Results Reviewed     Procedure Component Value Units Date/Time    HS Troponin 0hr (reflex protocol) [[id number]]  (Normal) Collected: 01/15/23 1958    Lab Status: Final result Specimen: Blood from Arm, Left Updated: 01/15/23 2044     hs TnI 0hr 2 ng/L     D-Dimer [[id number]]  (Abnormal) Collected: 01/15/23 1958    Lab Status: Final result Specimen: Blood from Arm, Left Updated: 01/15/23 2040     D-Dimer, Quant 0 54 ug/ml FEU     Comprehensive metabolic panel [[id number]]  (Abnormal) Collected: 01/15/23 1958    Lab Status: Final result Specimen: Blood from Arm, Left Updated: 01/15/23 2038     Sodium 139 mmol/L      Potassium 3 6 mmol/L      Chloride 105 mmol/L      CO2 29 mmol/L      ANION GAP 5 mmol/L      BUN 10 mg/dL      Creatinine 0 83 mg/dL      Glucose 90 mg/dL      Calcium 8 9 mg/dL      AST 91 U/L       U/L      Alkaline Phosphatase 85 U/L      Total Protein 7 0 g/dL      Albumin 3 9 g/dL      Total Bilirubin 0 44 mg/dL      eGFR 106 ml/min/1 73sq m     Narrative:      Jammie guidelines for Chronic Kidney Disease (CKD):   •  Stage 1 with normal or high GFR (GFR > 90 mL/min/1 73 square meters)  •  Stage 2 Mild CKD (GFR = 60-89 mL/min/1 73 square meters)  •  Stage 3A Moderate CKD (GFR = 45-59 mL/min/1 73 square meters)  •  Stage 3B Moderate CKD (GFR = 30-44 mL/min/1 73 square meters)  •  Stage 4 Severe CKD (GFR = 15-29 mL/min/1 73 square meters)  •  Stage 5 End Stage CKD (GFR <15 mL/min/1 73 square meters)  Note: GFR calculation is accurate only with a steady state creatinine    Protime-INR [[id number]]  (Normal) Collected: 01/15/23 1958    Lab Status: Final result Specimen: Blood from Arm, Left Updated: 01/15/23 2033 Protime 14 3 seconds      INR 1 09    APTT [[id number]]  (Normal) Collected: 01/15/23 1958    Lab Status: Final result Specimen: Blood from Arm, Left Updated: 01/15/23 2033     PTT 32 seconds     CBC and differential [[id number]] Collected: 01/15/23 1958    Lab Status: Final result Specimen: Blood from Arm, Left Updated: 01/15/23 2007     WBC 5 64 Thousand/uL      RBC 4 89 Million/uL      Hemoglobin 14 2 g/dL      Hematocrit 42 7 %      MCV 87 fL      MCH 29 0 pg      MCHC 33 3 g/dL      RDW 12 5 %      MPV 10 1 fL      Platelets 063 Thousands/uL      nRBC 0 /100 WBCs      Neutrophils Relative 56 %      Immat GRANS % 1 %      Lymphocytes Relative 34 %      Monocytes Relative 6 %      Eosinophils Relative 2 %      Basophils Relative 1 %      Neutrophils Absolute 3 18 Thousands/µL      Immature Grans Absolute 0 03 Thousand/uL      Lymphocytes Absolute 1 92 Thousands/µL      Monocytes Absolute 0 36 Thousand/µL      Eosinophils Absolute 0 12 Thousand/µL      Basophils Absolute 0 03 Thousands/µL                  XR cervical spine 2 or 3 views   ED Interpretation by Marquis Meng MD (01/15 2237)   No fracture or subluxation appreciated  Mild degenerative changes  Final Result by Mario Mcknight MD (01/16 5461)      No acute osseous abnormality  Workstation performed: [id number]         CTA ED chest PE study   Final Result by Saintclair Sailors, MD (01/15 2235)      1  No pulmonary embolism   2  Small right and trace left pleural effusions                  Workstation performed: [id number]         CT head without contrast   Final Result by Shannon Tyler MD (01/15 2025)      No intracranial hemorrhage or calvarial fracture                    Workstation performed: [id number]                    Procedures  ECG 12 Lead Documentation Only    Date/Time: 1/15/2023 7:15 PM  Performed by: Marquis Meng MD  Authorized by: Marquis Meng MD     ECG reviewed by me, the ED Provider: yes    Patient location:  ED  Previous ECG:     Previous ECG:  Compared to current    Comparison ECG info:  1/11/2023 -sinus bradycardia on prior  No change in morphology  Rate:     ECG rate:  84    ECG rate assessment: normal    Rhythm:     Rhythm: sinus rhythm    QRS:     QRS axis:  Normal    QRS intervals:  Normal  Conduction:     Conduction: normal    ST segments:     ST segments:  Normal  T waves:     T waves: normal               ED Course  ED Course as of 01/23/23 012Cosme Mack Mihai 15, 2023   2050 Patient does have some chronic LFT elevation, slightly higher today                       PERC Rule for PE    Flowsheet Row Most Recent Value   PERC Rule for PE    Age >=50 0 Filed at: 01/15/2023 1940   HR >=100 0 Filed at: 01/15/2023 1940   O2 Sat on room air < 95% 0 Filed at: 01/15/2023 1940   History of PE or DVT 0 Filed at: 01/15/2023 1940   Recent trauma or surgery 0 Filed at: 01/15/2023 1940   Hemoptysis 1 Filed at: 01/15/2023 1940   Exogenous estrogen 0 Filed at: 01/15/2023 1940   Unilateral leg swelling 0 Filed at: 01/15/2023 1940   PERC Rule for PE Results 1 Filed at: 01/15/2023 1940              SBIRT 20yo+    Flowsheet Row Most Recent Value   SBIRT (23 yo +)    In order to provide better care to our patients, we are screening all of our patients for alcohol and drug use  Would it be okay to ask you these screening questions?  Unable to answer at this time Filed at: 01/15/2023 1900          Wells' Criteria for PE    Flowsheet Row Most Recent Value   Wells' Criteria for PE    Clinical signs and symptoms of DVT 0 Filed at: 01/15/2023 1940   PE is primary diagnosis or equally likely 0 Filed at: 01/15/2023 1940   HR >100 0 Filed at: 01/15/2023 1940   Immobilization at least 3 days or Surgery in the previous 4 weeks 0 Filed at: 01/15/2023 1940   Previous, objectively diagnosed PE or DVT 0 Filed at: 01/15/2023 1940   Hemoptysis 1 Filed at: 01/15/2023 1940   Malignancy with treatment within 6 months or palliative 0 Filed at: 01/15/2023 1940   Wells' Criteria Total 1 Filed at: 01/15/2023 1940                MDM    Disposition  Final diagnoses:   Dizziness   Contusion of forehead, initial encounter   Laceration of forehead, initial encounter   Pleural effusion   Transaminitis   Lightheadedness     Time reflects when diagnosis was documented in both MDM as applicable and the Disposition within this note     Time User Action Codes Description Comment    1/15/2023 11:15 PM [place] Cue A Add [R42] Dizziness     1/15/2023 11:16 PM [place] Cue A Add [S00 83XA] Contusion of forehead, initial encounter     1/15/2023 11:16 PM [place] Cue A Add [S01 81XA] Laceration of forehead, initial encounter     1/15/2023 11:17 PM [place] Cue A Add [J90] Pleural effusion     1/15/2023 11:17 PM [place] Cue A Add [R74 01] Transaminitis     1/15/2023 11:21 PM [place] Cue Add [R42] [facility]       ED Disposition     ED Disposition   Discharge    Condition   Stable    Date/Time   Sun Mihai 15, 2023 11:15 PM    Comment   Adrian Wilkerson discharge to home/self care                 Follow-up Information     Follow up With Specialties Details Why Contact Info    Kareen Alfonso DO Family Medicine Schedule an appointment as soon as possible for a visit   [address]  [phone]            Discharge Medication List as of 1/15/2023 11:42 PM      CONTINUE these medications which have NOT CHANGED    Details   acetaminophen (TYLENOL) 325 mg tablet Take 650 mg by mouth every 6 (six) hours as needed for mild pain, Until Discontinued, Historical Med      ALPRAZolam (XANAX) 1 mg tablet Take 1 mg by mouth 3 (three) times a day, Historical Med      Buprenorphine HCl-Naloxone HCl 12-3 MG FILM Place 12 mg under the tongue 3 (three) times a day, Historical Med      calcium carbonate (TUMS) 500 mg chewable tablet Chew 2 tablets 2 (two) times a day, Until Discontinued, Historical Med      citalopram (CeleXA) 40 mg tablet Take 40 mg by mouth daily, Historical Med      meclizine (ANTIVERT) 25 mg tablet Take 1 tablet (25 mg total) by mouth 3 (three) times a day as needed for dizziness, Starting Wed 1/11/2023, Normal      ondansetron (ZOFRAN) 4 mg tablet Take 1 tablet (4 mg total) by mouth every 6 (six) hours, Starting Wed 1/11/2023, Normal      pantoprazole (PROTONIX) 40 mg tablet Take 1 tablet (40 mg total) by mouth daily, Starting Tue 10/6/2020, Normal      tobramycin-dexamethasone (TOBRADEX) ophthalmic suspension Administer 1 drop to the right eye every 4 (four) hours while awake, Starting Mon 1/9/2023, Normal             No discharge procedures on file      PDMP Review       Value Time User    PDMP Reviewed  Yes 1/11/2023  5:51 AM Oretha Galeazzi, MD          ED Provider  Electronically Signed by Yes, record another set of vital signs

## 2024-01-23 ENCOUNTER — HOSPITAL ENCOUNTER (EMERGENCY)
Facility: HOSPITAL | Age: 49
Discharge: HOME | End: 2024-01-23
Attending: EMERGENCY MEDICINE
Payer: COMMERCIAL

## 2024-01-23 ENCOUNTER — APPOINTMENT (EMERGENCY)
Dept: CT IMAGING | Facility: HOSPITAL | Age: 49
End: 2024-01-23
Payer: COMMERCIAL

## 2024-01-23 VITALS
SYSTOLIC BLOOD PRESSURE: 103 MMHG | TEMPERATURE: 98.9 F | DIASTOLIC BLOOD PRESSURE: 58 MMHG | HEART RATE: 61 BPM | RESPIRATION RATE: 16 BRPM | OXYGEN SATURATION: 96 %

## 2024-01-23 DIAGNOSIS — R56.9 NEW ONSET SEIZURE (HCC): Primary | ICD-10-CM

## 2024-01-23 LAB
ALBUMIN SERPL BCP-MCNC: 4 G/DL (ref 3.5–5)
ALP SERPL-CCNC: 66 U/L (ref 34–104)
ALT SERPL W P-5'-P-CCNC: 18 U/L (ref 7–52)
ANION GAP SERPL CALCULATED.3IONS-SCNC: 3 MMOL/L
APAP SERPL-MCNC: <2 UG/ML (ref 10–20)
AST SERPL W P-5'-P-CCNC: 33 U/L (ref 13–39)
BASOPHILS # BLD AUTO: 0.04 THOUSANDS/ÂΜL (ref 0–0.1)
BASOPHILS NFR BLD AUTO: 1 % (ref 0–1)
BILIRUB SERPL-MCNC: 0.75 MG/DL (ref 0.2–1)
BUN SERPL-MCNC: 14 MG/DL (ref 5–25)
CALCIUM SERPL-MCNC: 8.5 MG/DL (ref 8.4–10.2)
CHLORIDE SERPL-SCNC: 101 MMOL/L (ref 96–108)
CO2 SERPL-SCNC: 27 MMOL/L (ref 21–32)
CREAT SERPL-MCNC: 0.69 MG/DL (ref 0.6–1.3)
EOSINOPHIL # BLD AUTO: 0.11 THOUSAND/ÂΜL (ref 0–0.61)
EOSINOPHIL NFR BLD AUTO: 2 % (ref 0–6)
ERYTHROCYTE [DISTWIDTH] IN BLOOD BY AUTOMATED COUNT: 13.1 % (ref 11.6–15.1)
ETHANOL SERPL-MCNC: <10 MG/DL
GFR SERPL CREATININE-BSD FRML MDRD: 113 ML/MIN/1.73SQ M
GLUCOSE SERPL-MCNC: 103 MG/DL (ref 65–140)
GLUCOSE SERPL-MCNC: 117 MG/DL (ref 65–140)
GLUCOSE SERPL-MCNC: 142 MG/DL (ref 65–140)
GLUCOSE SERPL-MCNC: 56 MG/DL (ref 65–140)
HCT VFR BLD AUTO: 43.2 % (ref 36.5–49.3)
HGB BLD-MCNC: 14.6 G/DL (ref 12–17)
IMM GRANULOCYTES # BLD AUTO: 0.03 THOUSAND/UL (ref 0–0.2)
IMM GRANULOCYTES NFR BLD AUTO: 0 % (ref 0–2)
LYMPHOCYTES # BLD AUTO: 2.42 THOUSANDS/ÂΜL (ref 0.6–4.47)
LYMPHOCYTES NFR BLD AUTO: 35 % (ref 14–44)
MAGNESIUM SERPL-MCNC: 2.1 MG/DL (ref 1.9–2.7)
MCH RBC QN AUTO: 29.3 PG (ref 26.8–34.3)
MCHC RBC AUTO-ENTMCNC: 33.8 G/DL (ref 31.4–37.4)
MCV RBC AUTO: 87 FL (ref 82–98)
MONOCYTES # BLD AUTO: 0.54 THOUSAND/ÂΜL (ref 0.17–1.22)
MONOCYTES NFR BLD AUTO: 8 % (ref 4–12)
NEUTROPHILS # BLD AUTO: 3.79 THOUSANDS/ÂΜL (ref 1.85–7.62)
NEUTS SEG NFR BLD AUTO: 54 % (ref 43–75)
NRBC BLD AUTO-RTO: 0 /100 WBCS
PLATELET # BLD AUTO: 206 THOUSANDS/UL (ref 149–390)
PMV BLD AUTO: 11.8 FL (ref 8.9–12.7)
POTASSIUM SERPL-SCNC: 5.4 MMOL/L (ref 3.5–5.3)
PROT SERPL-MCNC: 6.5 G/DL (ref 6.4–8.4)
RBC # BLD AUTO: 4.99 MILLION/UL (ref 3.88–5.62)
SALICYLATES SERPL-MCNC: <5 MG/DL (ref 3–20)
SODIUM SERPL-SCNC: 131 MMOL/L (ref 135–147)
WBC # BLD AUTO: 6.93 THOUSAND/UL (ref 4.31–10.16)

## 2024-01-23 PROCEDURE — 96374 THER/PROPH/DIAG INJ IV PUSH: CPT

## 2024-01-23 PROCEDURE — 83735 ASSAY OF MAGNESIUM: CPT | Performed by: EMERGENCY MEDICINE

## 2024-01-23 PROCEDURE — 80179 DRUG ASSAY SALICYLATE: CPT | Performed by: EMERGENCY MEDICINE

## 2024-01-23 PROCEDURE — 82948 REAGENT STRIP/BLOOD GLUCOSE: CPT

## 2024-01-23 PROCEDURE — 99285 EMERGENCY DEPT VISIT HI MDM: CPT

## 2024-01-23 PROCEDURE — 36415 COLL VENOUS BLD VENIPUNCTURE: CPT | Performed by: EMERGENCY MEDICINE

## 2024-01-23 PROCEDURE — 80143 DRUG ASSAY ACETAMINOPHEN: CPT | Performed by: EMERGENCY MEDICINE

## 2024-01-23 PROCEDURE — 93005 ELECTROCARDIOGRAM TRACING: CPT

## 2024-01-23 PROCEDURE — G1004 CDSM NDSC: HCPCS

## 2024-01-23 PROCEDURE — 80053 COMPREHEN METABOLIC PANEL: CPT | Performed by: EMERGENCY MEDICINE

## 2024-01-23 PROCEDURE — 85025 COMPLETE CBC W/AUTO DIFF WBC: CPT | Performed by: EMERGENCY MEDICINE

## 2024-01-23 PROCEDURE — 99285 EMERGENCY DEPT VISIT HI MDM: CPT | Performed by: EMERGENCY MEDICINE

## 2024-01-23 PROCEDURE — 82077 ASSAY SPEC XCP UR&BREATH IA: CPT | Performed by: EMERGENCY MEDICINE

## 2024-01-23 PROCEDURE — 70450 CT HEAD/BRAIN W/O DYE: CPT

## 2024-01-23 RX ORDER — LORAZEPAM 2 MG/ML
1 INJECTION INTRAMUSCULAR ONCE
Status: COMPLETED | OUTPATIENT
Start: 2024-01-23 | End: 2024-01-23

## 2024-01-23 RX ADMIN — LORAZEPAM 1 MG: 2 INJECTION INTRAMUSCULAR; INTRAVENOUS at 07:12

## 2024-01-23 NOTE — Clinical Note
Edd Quezada was seen and treated in our emergency department on 1/23/2024.                Diagnosis:     Edd  may return to work on return date, is off the rest of the shift today.    He may return on this date: 01/24/2024         If you have any questions or concerns, please don't hesitate to call.      Erik Chase MD    ______________________________           _______________          _______________  Hospital Representative                              Date                                Time

## 2024-01-23 NOTE — ED PROVIDER NOTES
"History  Chief Complaint   Patient presents with    Seizure - New Onset     Pt arrives to ED via EMS from home. Pts wife reports pt was sleeping & began shaking; multiple episodes. Pts wife reports she was unable to wake pt. When pt did wake, he was very confused, vomited, and \"couldn't see.\" Pt does not remember episodes. Pt reports hx of anxiety/panic attacks; Alprazolam taken 3x/day. Pt dressed & walked himself to EMS w/o difficulty. Pt also reports Suboxone regimen. BS 56 in triage.    Anxiety     (Edd Quezada) Edd Quezada is a 48 y.o. male     They presented to the emergency department on January 23, 2024. Patient presents with:  Seizure - New Onset: Pt arrives to ED via EMS from home. Pts wife reports pt was sleeping & began shaking; multiple episodes. Pts wife reports she was unable to wake pt. When pt did wake, he was very confused, vomited, and \"couldn't see.\" Pt does not remember episodes. Pt reports hx of anxiety/panic attacks; Alprazolam taken 3x/day. Pt dressed & walked himself to EMS w/o difficulty. Pt also reports Suboxone regimen. BS 56 in triage.  Anxiety.    The patient states that he awoke this morning confused was told by his wife that he had multiple seizures throughout the night and EMS was called and brought him into the emergency department.  Patient currently states that he is tired, and has a mildly sore left shoulder, however otherwise states that he feels well.  Per patient's wife at bedside she awoke to the patient convulsing shaking his entire body eyes appearing to be fixed.  Patient's wife notes that this occurred roughly 10 times.  Patient's wife notes that on one of the intervals between reported seizures patient complained of not being able to see, and on a different episode vomited.  Patient notes that he does have a history of anxiety and panic attacks for which she takes alprazolam 1 mg 3 times daily, however patient is unable to recall if he took his medications " yesterday.  Patient also notes that he only had 1 alcoholic beer yesterday, which was the first he had intake since 2014. Patient denies recent illness including fever, chills, congestion, cough, difficulty breathing, chest pain, abdominal pain, change in bowel habits, change in urination, or any other complaint at this time.              Prior to Admission Medications   Prescriptions Last Dose Informant Patient Reported? Taking?   ALPRAZolam (XANAX) 1 mg tablet   Yes No   Sig: Take 1 mg by mouth 3 (three) times a day   Buprenorphine HCl-Naloxone HCl 12-3 MG FILM   Yes No   Sig: Place 12 mg under the tongue 3 (three) times a day   acetaminophen (TYLENOL) 325 mg tablet   Yes No   Sig: Take 650 mg by mouth every 6 (six) hours as needed for mild pain   calcium carbonate (TUMS) 500 mg chewable tablet   Yes No   Sig: Chew 2 tablets 2 (two) times a day   citalopram (CeleXA) 40 mg tablet   Yes No   Sig: Take 40 mg by mouth daily   meclizine (ANTIVERT) 25 mg tablet   No No   Sig: Take 1 tablet (25 mg total) by mouth 3 (three) times a day as needed for dizziness   ondansetron (ZOFRAN) 4 mg tablet   No No   Sig: Take 1 tablet (4 mg total) by mouth every 6 (six) hours   pantoprazole (PROTONIX) 40 mg tablet   No No   Sig: Take 1 tablet (40 mg total) by mouth daily   Patient not taking: Reported on 11/12/2020   tobramycin-dexamethasone (TOBRADEX) ophthalmic suspension   No No   Sig: Administer 1 drop to the right eye every 4 (four) hours while awake      Facility-Administered Medications: None       Past Medical History:   Diagnosis Date    Low back pain with sciatica     Vertigo        Past Surgical History:   Procedure Laterality Date    ESOPHAGOGASTRODUODENOSCOPY N/A     KNEE ARTHROSCOPY Right     SD BX/EXC LYMPH NODE OPEN SUPERFICIAL Left 1/24/2017    Procedure: INGUINAL LYMPH NODE BIOPSY WITH FLOW CYTOMETRY;  Surgeon: Steffen Munguia DO;  Location: [facility];  Service: General       History reviewed. No pertinent family  history.  I have reviewed and agree with the history as documented.    E-Cigarette/Vaping     E-Cigarette/Vaping Substances    Nicotine Yes      Social History     Tobacco Use    Smoking status: Former     Current packs/day: 1.00     Average packs/day: 1 pack/day for 25.0 years (25.0 ttl pk-yrs)     Types: Cigarettes    Smokeless tobacco: Current    Tobacco comments:     Vape   Substance Use Topics    Alcohol use: No    Drug use: No        Review of Systems   Constitutional:  Negative for chills and fever.   HENT:  Negative for ear pain and sore throat.    Eyes:  Positive for visual disturbance (resolved). Negative for pain.   Respiratory:  Negative for cough and shortness of breath.    Cardiovascular:  Negative for chest pain and palpitations.   Gastrointestinal:  Negative for abdominal pain and vomiting.   Genitourinary:  Negative for dysuria and hematuria.   Musculoskeletal:  Negative for arthralgias and back pain.        Left shoulder pain   Skin:  Negative for color change and rash.   Neurological:  Positive for seizures. Negative for syncope.   All other systems reviewed and are negative.      Physical Exam  ED Triage Vitals [01/23/24 0555]   Temperature Pulse Respirations Blood Pressure SpO2   98.9 °F (37.2 °C) 82 18 111/56 96 %      Temp Source Heart Rate Source Patient Position - Orthostatic VS BP Location FiO2 (%)   Oral Monitor Sitting Right arm --      Pain Score       --             Orthostatic Vital Signs  Vitals:    01/23/24 0600 01/23/24 0630 01/23/24 0716 01/23/24 0900   BP: 113/56 104/55 109/56 103/58   Pulse: 85 71 61 61   Patient Position - Orthostatic VS: Sitting  Lying Lying       Physical Exam  Vitals and nursing note reviewed.   Constitutional:       General: He is not in acute distress.     Appearance: Normal appearance.   HENT:      Head: Normocephalic and atraumatic.      Right Ear: External ear normal.      Left Ear: External ear normal.      Nose: Nose normal.      Mouth/Throat:       Mouth: Mucous membranes are moist.      Comments: Mild tongue fasciculations, no lacerations  Eyes:      Conjunctiva/sclera: Conjunctivae normal.   Cardiovascular:      Rate and Rhythm: Normal rate and regular rhythm.   Pulmonary:      Effort: Pulmonary effort is normal. No respiratory distress.      Breath sounds: Normal breath sounds.   Abdominal:      General: Abdomen is flat. Bowel sounds are normal.      Tenderness: There is no abdominal tenderness. There is no guarding or rebound.   Musculoskeletal:         General: Normal range of motion.      Cervical back: Normal range of motion.   Skin:     General: Skin is warm and dry.      Capillary Refill: Capillary refill takes less than 2 seconds.   Neurological:      Mental Status: He is alert and oriented to person, place, and time. Mental status is at baseline.      Cranial Nerves: No cranial nerve deficit.      Sensory: No sensory deficit.      Motor: No weakness.      Coordination: Coordination normal.   Psychiatric:         Mood and Affect: Mood normal.         ED Medications  Medications   LORazepam (ATIVAN) injection 1 mg (1 mg Intravenous Given 1/23/24 0712)       Diagnostic Studies  Results Reviewed       Procedure Component Value Units Date/Time    Comprehensive metabolic panel [[id number]]  (Abnormal) Collected: 01/23/24 0812    Lab Status: Final result Specimen: Blood from Arm, Left Updated: 01/23/24 0855     Sodium 131 mmol/L      Potassium 5.4 mmol/L      Chloride 101 mmol/L      CO2 27 mmol/L      ANION GAP 3 mmol/L      BUN 14 mg/dL      Creatinine 0.69 mg/dL      Glucose 142 mg/dL      Calcium 8.5 mg/dL      AST 33 U/L      ALT 18 U/L      Alkaline Phosphatase 66 U/L      Total Protein 6.5 g/dL      Albumin 4.0 g/dL      Total Bilirubin 0.75 mg/dL      eGFR 113 ml/min/1.73sq m     Narrative:      National Kid[employer] Disease Foundation guidelines for Chronic Kidney Disease (CKD):     Stage 1 with normal or high GFR (GFR > 90 mL/min/1.73 square meters)     Stage 2 Mild CKD (GFR = 60-89 mL/min/1.73 square meters)    Stage 3A Moderate CKD (GFR = 45-59 mL/min/1.73 square meters)    Stage 3B Moderate CKD (GFR = 30-44 mL/min/1.73 square meters)    Stage 4 Severe CKD (GFR = 15-29 mL/min/1.73 square meters)    Stage 5 End Stage CKD (GFR <15 mL/min/1.73 square meters)  Note: GFR calculation is accurate only with a steady state creatinine    Magnesium [[id number]]  (Normal) Collected: 01/23/24 0812    Lab Status: Final result Specimen: Blood from Arm, Left Updated: 01/23/24 0855     Magnesium 2.1 mg/dL     Salicylate level [[id number]]  (Normal) Collected: 01/23/24 0812    Lab Status: Final result Specimen: Blood from Arm, Left Updated: 01/23/24 0855     Salicylate Lvl <5 mg/dL     Acetaminophen level-If concentration is detectable, please discuss with medical  on call. [[id number]]  (Abnormal) Collected: 01/23/24 0812    Lab Status: Final result Specimen: Blood from Arm, Left Updated: 01/23/24 0855     Acetaminophen Level <2 ug/mL     Fingerstick Glucose (POCT) [[id number]]  (Normal) Collected: 01/23/24 0850    Lab Status: Final result Updated: 01/23/24 0851     POC Glucose 117 mg/dl     Ethanol [[id number]]  (Normal) Collected: 01/23/24 0638    Lab Status: Final result Specimen: Blood from Arm, Left Updated: 01/23/24 0717     Ethanol Lvl <10 mg/dL     CBC and differential [[id number]] Collected: 01/23/24 0638    Lab Status: Final result Specimen: Blood from Arm, Left Updated: 01/23/24 0656     WBC 6.93 Thousand/uL      RBC 4.99 Million/uL      Hemoglobin 14.6 g/dL      Hematocrit 43.2 %      MCV 87 fL      MCH 29.3 pg      MCHC 33.8 g/dL      RDW 13.1 %      MPV 11.8 fL      Platelets 206 Thousands/uL      nRBC 0 /100 WBCs      Neutrophils Relative 54 %      Immat GRANS % 0 %      Lymphocytes Relative 35 %      Monocytes Relative 8 %      Eosinophils Relative 2 %      Basophils Relative 1 %      Neutrophils Absolute 3.79 Thousands/µL      Immature Grans Absolute 0.03  Thousand/uL      Lymphocytes Absolute 2.42 Thousands/µL      Monocytes Absolute 0.54 Thousand/µL      Eosinophils Absolute 0.11 Thousand/µL      Basophils Absolute 0.04 Thousands/µL     Fingerstick Glucose (POCT) [[id number]]  (Normal) Collected: 01/23/24 0628    Lab Status: Final result Updated: 01/23/24 0629     POC Glucose 103 mg/dl     Fingerstick Glucose (POCT) [[id number]]  (Abnormal) Collected: 01/23/24 0551    Lab Status: Final result Updated: 01/23/24 0622     POC Glucose 56 mg/dl                    CT head without contrast   Final Result by Mikey Larson MD (01/23 0656)      No evidence of acute intracranial process.                        Workstation performed: [id number]               Procedures  Procedures      ED Course  ED Course as of 01/23/24 0938   Tue Jan 23, 2024   0630 POC Glucose: 103  Improved after eating   0657 CBC and differential  Reassuring, within normal limits     0657 CT head without contrast  No abnormality.   0721 Discussed with patient results of blood work as well as CT imaging results.  States that he is feeling somewhat better, however is tired.   0805 Per nursing patient's blood work has been hemolyzed on multiple times, discussing with  on how to troubleshoot.                             SBIRT 20yo+      Flowsheet Row Most Recent Value   Initial Alcohol Screen: US AUDIT-C     1. How often do you have a drink containing alcohol? 1 Filed at: 01/23/2024 0559   2. How many drinks containing alcohol do you have on a typical day you are drinking?  1 Filed at: 01/23/2024 0559   3a. Male UNDER 65: How often do you have five or more drinks on one occasion? 0 Filed at: 01/23/2024 0559   3b. FEMALE Any Age, or MALE 65+: How often do you have 4 or more drinks on one occassion? 0 Filed at: 01/23/2024 0559   Audit-C Score 2 Filed at: 01/23/2024 0559   FINA: How many times in the past year have you...    Used an illegal drug or used a prescription medication for  non-medical reasons? Never Filed at: 01/23/2024 0559                  Medical Decision Making  48-year-old male presenting for new onset seizure, amnesic to the events prior to arrival.  Per wife patient had multiple episodes of seizure-like activity which is new.  Patient unable to recall if he took his normal alprazolam yesterday, potential decrease in seizure threshold with abstinence.  Will obtain blood work at this time assess for signs of infection, electrolyte abnormality, or toxic substance.  Will also obtain CT imaging of the head to evaluate for intracranial abnormality.    Laboratory analysis as well as radiographic imaging without pertinent findings to explain patient's reported symptoms.    Patient provided with Ativan in the emergency department and had no repeat episodes of reported complaints.    Ambulatory referral was placed to neurology.  Patient appears well, nontoxic, agrees with plan of care at this time.  In light of this, patient would benefit from outpatient follow-up.    Amount and/or Complexity of Data Reviewed  Labs: ordered. Decision-making details documented in ED Course.  Radiology: ordered. Decision-making details documented in ED Course.    Risk  Prescription drug management.          Disposition  Final diagnoses:   New onset seizure (HCC)     Time reflects when diagnosis was documented in both MDM as applicable and the Disposition within this note       Time User Action Codes Description Comment    1/23/2024  8:57 AM Erik Chase Add [R56.9] New onset seizure (HCC)           ED Disposition       ED Disposition   Discharge    Condition   Stable    Date/Time   Tue Jan 23, 2024 0857    Comment   Edd Quezada discharge to home/self care.                   Follow-up Information       Follow up With Specialties Details Why Contact Info    Remi Mckay DO [facility]   [address] [facility] [id number] [phone]              Discharge Medication List as of 1/23/2024   8:58 AM        CONTINUE these medications which have NOT CHANGED    Details   acetaminophen (TYLENOL) 325 mg tablet Take 650 mg by mouth every 6 (six) hours as needed for mild pain, Until Discontinued, Historical Med      ALPRAZolam (XANAX) 1 mg tablet Take 1 mg by mouth 3 (three) times a day, Historical Med      Buprenorphine HCl-Naloxone HCl 12-3 MG FILM Place 12 mg under the tongue 3 (three) times a day, Historical Med      calcium carbonate (TUMS) 500 mg chewable tablet Chew 2 tablets 2 (two) times a day, Until Discontinued, Historical Med      citalopram (CeleXA) 40 mg tablet Take 40 mg by mouth daily, Historical Med      meclizine (ANTIVERT) 25 mg tablet Take 1 tablet (25 mg total) by mouth 3 (three) times a day as needed for dizziness, Starting Wed 1/11/2023, Normal      ondansetron (ZOFRAN) 4 mg tablet Take 1 tablet (4 mg total) by mouth every 6 (six) hours, Starting Wed 1/11/2023, Normal      pantoprazole (PROTONIX) 40 mg tablet Take 1 tablet (40 mg total) by mouth daily, Starting Tue 10/6/2020, Normal      tobramycin-dexamethasone (TOBRADEX) ophthalmic suspension Administer 1 drop to the right eye every 4 (four) hours while awake, Starting Mon 1/9/2023, Normal               PDMP Review         Value Time User    PDMP Reviewed  Yes 1/11/2023  5:51 AM Gennaro Vega MD             ED Provider  Attending physically available and evaluated Edd Quezada. I managed the patient along with the ED Attending.    Electronically Signed by           Erik Chase MD  01/23/24 0917

## 2024-01-27 LAB
ATRIAL RATE: 79 BPM
P AXIS: 61 DEGREES
PR INTERVAL: 128 MS
QRS AXIS: 48 DEGREES
QRSD INTERVAL: 92 MS
QT INTERVAL: 406 MS
QTC INTERVAL: 465 MS
T WAVE AXIS: 49 DEGREES
VENTRICULAR RATE: 79 BPM

## 2024-02-19 ENCOUNTER — HOSPITAL ENCOUNTER (EMERGENCY)
Facility: HOSPITAL | Age: 49
Discharge: HOME | End: 2024-02-19
Attending: EMERGENCY MEDICINE | Admitting: EMERGENCY MEDICINE
Payer: COMMERCIAL

## 2024-02-19 ENCOUNTER — APPOINTMENT (EMERGENCY)
Dept: ULTRASOUND IMAGING | Facility: HOSPITAL | Age: 49
End: 2024-02-19
Payer: COMMERCIAL

## 2024-02-19 VITALS
SYSTOLIC BLOOD PRESSURE: 106 MMHG | WEIGHT: 184.08 LBS | HEART RATE: 90 BPM | BODY MASS INDEX: 24.97 KG/M2 | OXYGEN SATURATION: 98 % | TEMPERATURE: 98 F | RESPIRATION RATE: 20 BRPM | DIASTOLIC BLOOD PRESSURE: 62 MMHG

## 2024-02-19 DIAGNOSIS — N50.89 TESTICULAR CALCIFICATION: ICD-10-CM

## 2024-02-19 DIAGNOSIS — N45.2 ORCHITIS OF LEFT TESTICLE: Primary | ICD-10-CM

## 2024-02-19 LAB
BACTERIA UR QL AUTO: ABNORMAL /HPF
BILIRUB UR QL STRIP: NEGATIVE
CLARITY UR: ABNORMAL
COLOR UR: YELLOW
GLUCOSE UR STRIP-MCNC: NEGATIVE MG/DL
HGB UR QL STRIP.AUTO: NEGATIVE
KETONES UR STRIP-MCNC: NEGATIVE MG/DL
LEUKOCYTE ESTERASE UR QL STRIP: NEGATIVE
MUCOUS THREADS UR QL AUTO: ABNORMAL
NITRITE UR QL STRIP: NEGATIVE
NON-SQ EPI CELLS URNS QL MICRO: ABNORMAL /HPF
PH UR STRIP.AUTO: 5.5 [PH]
PROT UR STRIP-MCNC: ABNORMAL MG/DL
RBC #/AREA URNS AUTO: ABNORMAL /HPF
SP GR UR STRIP.AUTO: 1.03 (ref 1–1.03)
UROBILINOGEN UR STRIP-ACNC: 2 MG/DL
WBC #/AREA URNS AUTO: ABNORMAL /HPF

## 2024-02-19 PROCEDURE — 81001 URINALYSIS AUTO W/SCOPE: CPT

## 2024-02-19 PROCEDURE — 96374 THER/PROPH/DIAG INJ IV PUSH: CPT

## 2024-02-19 PROCEDURE — 87591 N.GONORRHOEAE DNA AMP PROB: CPT

## 2024-02-19 PROCEDURE — 99284 EMERGENCY DEPT VISIT MOD MDM: CPT

## 2024-02-19 PROCEDURE — 87491 CHLMYD TRACH DNA AMP PROBE: CPT

## 2024-02-19 PROCEDURE — 99284 EMERGENCY DEPT VISIT MOD MDM: CPT | Performed by: EMERGENCY MEDICINE

## 2024-02-19 PROCEDURE — 76870 US EXAM SCROTUM: CPT

## 2024-02-19 RX ORDER — ACETAMINOPHEN 325 MG/1
975 TABLET ORAL ONCE
Status: COMPLETED | OUTPATIENT
Start: 2024-02-19 | End: 2024-02-19

## 2024-02-19 RX ORDER — LEVOFLOXACIN 500 MG/1
500 TABLET, FILM COATED ORAL ONCE
Status: COMPLETED | OUTPATIENT
Start: 2024-02-19 | End: 2024-02-19

## 2024-02-19 RX ORDER — NAPROXEN 500 MG/1
500 TABLET ORAL 2 TIMES DAILY PRN
Qty: 20 TABLET | Refills: 0 | Status: SHIPPED | OUTPATIENT
Start: 2024-02-19

## 2024-02-19 RX ORDER — KETOROLAC TROMETHAMINE 30 MG/ML
15 INJECTION, SOLUTION INTRAMUSCULAR; INTRAVENOUS ONCE
Status: COMPLETED | OUTPATIENT
Start: 2024-02-19 | End: 2024-02-19

## 2024-02-19 RX ORDER — LEVOFLOXACIN 500 MG/1
500 TABLET, FILM COATED ORAL DAILY
Qty: 10 TABLET | Refills: 0 | Status: SHIPPED | OUTPATIENT
Start: 2024-02-19 | End: 2024-02-29

## 2024-02-19 RX ORDER — MORPHINE SULFATE 10 MG/ML
5 INJECTION, SOLUTION INTRAMUSCULAR; INTRAVENOUS ONCE
Status: DISCONTINUED | OUTPATIENT
Start: 2024-02-19 | End: 2024-02-19

## 2024-02-19 RX ADMIN — ACETAMINOPHEN 975 MG: 325 TABLET ORAL at 06:07

## 2024-02-19 RX ADMIN — KETOROLAC TROMETHAMINE 15 MG: 30 INJECTION, SOLUTION INTRAMUSCULAR; INTRAVENOUS at 06:09

## 2024-02-19 RX ADMIN — LEVOFLOXACIN 500 MG: 500 TABLET, FILM COATED ORAL at 08:21

## 2024-02-19 NOTE — ED ATTENDING ATTESTATION
2/19/2024  I, Zion Jc DO, saw and evaluated the patient. I have discussed the patient with the resident/non-physician practitioner and agree with the resident's/non-physician practitioner's findings, Plan of Care, and MDM as documented in the resident's/non-physician practitioner's note, except where noted. All available labs and Radiology studies were reviewed.  I was present for key portions of any procedure(s) performed by the resident/non-physician practitioner and I was immediately available to provide assistance.       At this point I agree with the current assessment done in the Emergency Department.  I have conducted an independent evaluation of this patient a history and physical is as follows:        58-year-old male, left testicular pain.,  Pain has been on and off for the past 5 days, has been consistent now for the past 36 hours.,  Pain is dull, achy, nonradiating, worse with palpation and ambulation, says for the past 3 days he has not been able to wear anything tight that touches his testicle because it hurts he has been wearing loose baggy pants.,  No dysuria but does have initial difficulty starting stream he believes just due to pain, once he is urinating he has no true discomfort.,  No previous history of this.,  Says his urine is darker than normal, no penile discharge.,  No penile tenderness.,  Does have chronic lower back pain which is stable and unchanged from baseline.      No fevers no chills on exam, left spermatic cord and testicle are very tender.,  The left testicle is high riding.,  But in vertical lie.    US scrotum and testicles   ED Interpretation   FINDINGS:     TESTES:  Testes are symmetric and normal in size.     RIGHT testis = 3.7 x 2.4 x 2.6 cm. Volume 11.8 mL  Normal contour with homogeneous smooth echotexture.  No intratesticular mass lesion.  Few punctate echogenic foci in the lateral upper testicle arranged in a linear fashion without associated mass may represent  intratesticular phleboliths, spermatic granulomas or vascular calcification.     LEFT testis = 4.0 x 2.0 x 3.0 cm. Volume 12.5 mL  Normal contour with homogeneous smooth echotexture.  No intratesticular mass lesion or calcifications.     Doppler flow within both testes is present. Mild hyperemia on the left.     EPIDIDYMIDES:  Normal Size.  Doppler ultrasound demonstrates normal blood flow.  No epididymal lesions.     HYDROCELE: No significant fluid present.     VARICOCELE: None present.     SCROTUM: Scrotal thickness and appearance within normal limits. No evidence for extratesticular mass or hernia demonstrated.     IMPRESSION:     Mild hyperemia of the left    testicle may represent orchitis in the appropriate clinical context.     Few right intra-articular clustered calcifications without associated mass. See above discussion. This could be followed up with ultrasound in 3 to 6 months.     This study demonstrates a significant  finding and was documented as such in [facility] for liaison and referring practitioner notification.        Workstation performed: [id number]        Final Result      Mild hyperemia of the left testicle may represent orchitis in the appropriate clinical context.      Few right intra-articular clustered calcifications without associated mass. See above discussion. This could be followed up with ultrasound in 3 to 6 months.      This study demonstrates a significant  finding and was documented as such in [facility] for liaison and referring practitioner notification.         Workstation performed: [id number]                 MDM  Number of Diagnoses or Management Options  Orchitis of left testicle  Testicular calcification  Diagnosis management comments:       Initial ED assessment:    48-year-old male presents with left testicular pain.,  Very tender in the left testicle on exam    Initial DDx includes but is not limited to:   Orchitis, epididymitis, testicular torsion no palpable hernia on examination  so I think this is less likely    Initial ED plan:   Ultrasound, urine analysis, GC chlamydia, pain control        Final ED summary/disposition:   After evaluation and workup in the emergency department, found to have orchitis, discharged on Levaquin, also with testicular calcifications this was discussed with him at length he will follow with his PCP for repeat ultrasound in 3 to 6 months as recommended by radiology           Time reflects when diagnosis was documented in both MDM as applicable and the Disposition within this note       Time User Action Codes Description Comment    2/19/2024  7:59 AM Zion Jc Add [N45.2] Orchitis of left testicle     2/19/2024  8:00 AM Zion Jc Add [N50.89] Testicular calcification           ED Disposition       ED Disposition   Discharge    Condition   Stable    Date/Time   Mon Feb 19, 2024  7:59 AM    Comment   Edd Quezada discharge to home/self care.                   Follow-up Information       Follow up With Specialties Details Why Contact Rustam Mckay,  Family Medicine Call in 1 day To arrange for the next available appointment you will need a repeat ultrasound your testicle in 3 to 6 months [address] [facility]  [place] [id number] [phone]                  ED Course         Critical Care Time  Procedures

## 2024-02-19 NOTE — ED CARE HANDOFF
Emergency Department Sign Out Note        Sign out and transfer of care from Dr. Zimmerman. See Separate Emergency Department note.     The patient, Edd Quezada, was evaluated by the previous provider for testicular pain associated with testicular tenderness that has been ongoing for 5 days.    Workup Completed:  Laboratory Studies and Testicular US ordered for evaluation of testicular pain.  Urine studies ordered for evaluation of pain    ED Course / Workup Pending (followup):  Follow up imaging and labs studies for evaluation of testicular pain.  Reassessment of pain level.  Disposition pending imaging and studies.                                     Procedures  Medical Decision Making  Patient had ultrasonography findings conducted while in the emergency department that are consistent with left-sided orchitis.  Presence of testicular calcifications also noted on examination.    Physical examination and further disposition as well as discharge paperwork and discharge instructions were conducted by attending physician.  Patient was provided with discharge follow-up with their primary care provider for continued evaluation of symptomology.  Patient was provided with antibiotics (levofloxacin) for control of symptomology as well as treatment of orchitis.    Amount and/or Complexity of Data Reviewed  Labs: ordered.     Details: Laboratory evaluation and urine analysis conducted for presence of urinary tract infection versus sexually transmitted infections.  Radiology: ordered.     Details: Ultrasonography noted for the presence of orchitis on the left testicle.    Risk  OTC drugs.  Prescription drug management.            Disposition  Final diagnoses:   Orchitis of left testicle   Testicular calcification     Time reflects when diagnosis was documented in both MDM as applicable and the Disposition within this note       Time User Action Codes Description Comment    2/19/2024  7:59 AM Zion Jc Add [N45.2]  Orchitis of left testicle     2/19/2024  8:00 AM Zion Jc [N50.89] Testicular calcification           ED Disposition       ED Disposition   Discharge    Condition   Stable    Date/Time   Mon Feb 19, 2024  7:59 AM    Comment   Edd Quezada discharge to home/self care.                   Follow-up Information       Follow up With Specialties Details Why Contact Info    Remi Mckay,  Family Medicine Call in 1 day To arrange for the next available appointment you will need a repeat ultrasound your testicle in 3 to 6 months [address] [facility] [id number] [phone]            Patient's Medications   Discharge Prescriptions    LEVOFLOXACIN (LEVAQUIN) 500 MG TABLET    Take 1 tablet (500 mg total) by mouth daily for 10 days       Start Date: 2/19/2024 End Date: 2/29/2024       Order Dose: 500 mg       Quantity: 10 tablet    Refills: 0    NAPROXEN (NAPROSYN) 500 MG TABLET    Take 1 tablet (500 mg total) by mouth 2 (two) times a day as needed for mild pain       Start Date: 2/19/2024 End Date: --       Order Dose: 500 mg       Quantity: 20 tablet    Refills: 0     No discharge procedures on file.       ED Provider  Electronically Signed by     Ramy Arteaga MD  02/19/24 0822

## 2024-02-19 NOTE — ED PROVIDER NOTES
"History  Chief Complaint   Patient presents with    Pelvic Pain     Patient reports lower back pain and pelvic pain radiating into testicles that began Wednesday. Describes pain as shooting. Reports difficulty urinating. Reports pressure when urinating. Rates pain 8/10     48-year-old male with past medical history of low back pain with sciatica presents for evaluation of bilateral testicular pain x 5 days which patient believes has progressively worsened since onset.  Patient presented to an urgent care center 5 days ago at which point urinalysis was obtained and found to be within normal limits and at that time patient was advised to present to the ED for further workup of testicular pain however he was unable to present due to \"car issues.\"  Patient also reports mild difficulty starting his urine stream and continuing it however denies urinary incontinence, saddle anesthesia, bowel dysfunction or any other symptoms.  Denies history of STI.  Also denies associated symptoms of penile discharge, perineal lesions, dysuria, hematuria or any other symptoms.  No other concerns.        Prior to Admission Medications   Prescriptions Last Dose Informant Patient Reported? Taking?   ALPRAZolam (XANAX) 1 mg tablet   Yes No   Sig: Take 1 mg by mouth 3 (three) times a day   Buprenorphine HCl-Naloxone HCl 12-3 MG FILM   Yes No   Sig: Place 12 mg under the tongue 3 (three) times a day   acetaminophen (TYLENOL) 325 mg tablet   Yes No   Sig: Take 650 mg by mouth every 6 (six) hours as needed for mild pain   calcium carbonate (TUMS) 500 mg chewable tablet   Yes No   Sig: Chew 2 tablets 2 (two) times a day   citalopram (CeleXA) 40 mg tablet   Yes No   Sig: Take 40 mg by mouth daily   meclizine (ANTIVERT) 25 mg tablet   No No   Sig: Take 1 tablet (25 mg total) by mouth 3 (three) times a day as needed for dizziness   ondansetron (ZOFRAN) 4 mg tablet   No No   Sig: Take 1 tablet (4 mg total) by mouth every 6 (six) hours   pantoprazole " (PROTONIX) 40 mg tablet   No No   Sig: Take 1 tablet (40 mg total) by mouth daily   Patient not taking: Reported on 11/12/2020   tobramycin-dexamethasone (TOBRADEX) ophthalmic suspension   No No   Sig: Administer 1 drop to the right eye every 4 (four) hours while awake      Facility-Administered Medications: None       Past Medical History:   Diagnosis Date    Low back pain with sciatica     Vertigo        Past Surgical History:   Procedure Laterality Date    ESOPHAGOGASTRODUODENOSCOPY N/A     KNEE ARTHROSCOPY Right     [place] BX/EXC LYMPH NODE OPEN SUPERFICIAL Left 1/24/2017    Procedure: INGUINAL LYMPH NODE BIOPSY WITH FLOW CYTOMETRY;  Surgeon: Steffen Munguia DO;  Location: [facility];  Service: General       History reviewed. No pertinent family history.  I have reviewed and agree with the history as documented.    E-Cigarette/Vaping     E-Cigarette/Vaping Substances    Nicotine Yes      Social History     Tobacco Use    Smoking status: Former     Current packs/day: 1.00     Average packs/day: 1 pack/day for 25.0 years (25.0 ttl pk-yrs)     Types: Cigarettes    Smokeless tobacco: Current    Tobacco comments:     Vape   Substance Use Topics    Alcohol use: Yes     Comment: rarely    Drug use: No        Review of Systems   Constitutional:  Negative for chills and fever.   HENT:  Negative for ear pain and sore throat.    Eyes:  Negative for pain and visual disturbance.   Respiratory:  Negative for cough and shortness of breath.    Cardiovascular:  Negative for chest pain and palpitations.   Gastrointestinal:  Negative for abdominal pain and vomiting.   Genitourinary:  Positive for testicular pain. Negative for dysuria, hematuria, penile discharge and penile pain.   Musculoskeletal:  Negative for arthralgias and back pain.   Skin:  Negative for color change and rash.   Neurological:  Negative for seizures and syncope.   All other systems reviewed and are negative.      Physical Exam  ED Triage Vitals [02/19/24 0545]    Temperature Pulse Respirations Blood Pressure SpO2   98 °F (36.7 °C) 90 20 106/62 98 %      Temp src Heart Rate Source Patient Position - Orthostatic VS BP Location FiO2 (%)   -- Monitor -- -- --      Pain Score       8             Orthostatic Vital Signs  Vitals:    02/19/24 0545   BP: 106/62   Pulse: 90       Physical Exam  Vitals and nursing note reviewed.   Constitutional:       General: He is not in acute distress.     Appearance: Normal appearance. He is well-developed. He is not ill-appearing, toxic-appearing or diaphoretic.   HENT:      Head: Normocephalic and atraumatic.      Right Ear: External ear normal.      Left Ear: External ear normal.      Nose: Nose normal.      Mouth/Throat:      Mouth: Mucous membranes are moist.   Eyes:      Extraocular Movements: Extraocular movements intact.      Conjunctiva/sclera: Conjunctivae normal.   Cardiovascular:      Rate and Rhythm: Normal rate and regular rhythm.      Pulses: Normal pulses.      Heart sounds: Normal heart sounds. No murmur heard.  Pulmonary:      Effort: Pulmonary effort is normal. No respiratory distress.      Breath sounds: Normal breath sounds.   Abdominal:      General: Abdomen is flat.      Palpations: Abdomen is soft.      Tenderness: There is no abdominal tenderness. There is no right CVA tenderness, left CVA tenderness, guarding or rebound.   Genitourinary:     Penis: Normal.       Comments: Tenderness to palpation of L testicle that is high riding. L testicle is indurated.  Cremasteric reflexes difficult to obtain bilaterally.  No appreciable edema, palpable fluctuance or overlying erythema.  No penile discharge.  No perineal lesions.  Testicles are palpable bilaterally.   Musculoskeletal:         General: No swelling. Normal range of motion.      Cervical back: Normal range of motion and neck supple.      Comments: Tenderness over the midline and paraspinal lumbar spine which patient states is chronic level of pain    Skin:     General:  Skin is warm and dry.      Capillary Refill: Capillary refill takes less than 2 seconds.   Neurological:      General: No focal deficit present.      Mental Status: He is alert and oriented to person, place, and time. Mental status is at baseline.      Cranial Nerves: No cranial nerve deficit.      Sensory: No sensory deficit.      Motor: No weakness.      Gait: Gait normal.      Comments: Patellar reflexes intact bilaterally   Psychiatric:         Mood and Affect: Mood normal.         Behavior: Behavior normal.         ED Medications  Medications   acetaminophen (TYLENOL) tablet 975 mg (975 mg Oral Given 2/19/24 0607)   ketorolac (TORADOL) injection 15 mg (15 mg Intravenous Given 2/19/24 0609)       Diagnostic Studies  Results Reviewed       Procedure Component Value Units Date/Time    Urine Microscopic [[id number]]  (Abnormal) Collected: 02/19/24 0559    Lab Status: Final result Specimen: Urine, Clean Catch Updated: 02/19/24 0631     RBC, UA None Seen /hpf      WBC, UA 4-10 /hpf      Epithelial Cells Occasional /hpf      Bacteria, UA Occasional /hpf      MUCUS THREADS Innumerable    UA w Reflex to Microscopic w Reflex to Culture [[id number]]  (Abnormal) Collected: 02/19/24 0559    Lab Status: Final result Specimen: Urine, Clean Catch Updated: 02/19/24 0619     Color, UA Yellow     Clarity, UA Turbid     Specific Gravity, UA 1.033     pH, UA 5.5     Leukocytes, UA Negative     Nitrite, UA Negative     Protein,  (2+) mg/dl      Glucose, UA Negative mg/dl      Ketones, UA Negative mg/dl      Urobilinogen, UA 2.0 mg/dl      Bilirubin, UA Negative     Occult Blood, UA Negative    Chlamydia/GC amplified DNA by PCR [[id number]] Collected: 02/19/24 0559    Lab Status: In process Specimen: Urine, Other Updated: 02/19/24 0602                    scrotum and testicles    (Results Pending)         Procedures  Procedures      ED Course                             SBIRT 20yo+      Flowsheet Row Most Recent Value   Initial  Alcohol Screen: US AUDIT-C     1. How often do you have a drink containing alcohol? 1 Filed at: 02/19/2024 0545   2. How many drinks containing alcohol do you have on a typical day you are drinking?  0 Filed at: 02/19/2024 0545   3a. Male UNDER 65: How often do you have five or more drinks on one occasion? 0 Filed at: 02/19/2024 0545   3b. FEMALE Any Age, or MALE 65+: How often do you have 4 or more drinks on one occassion? 0 Filed at: 02/19/2024 0545   Audit-C Score 1 Filed at: 02/19/2024 0545   FINA: How many times in the past year have you...    Used an illegal drug or used a prescription medication for non-medical reasons? Never Filed at: 02/19/2024 0545                  Medical Decision Making  Patient remained stable throughout ED course.  Currently on Suboxone secondary to chronic opioid use, patient was provided Tylenol and IV Toradol with improvement of pain.  Due to appearance of the high riding, indurated and tender left testicle, there is high suspicion for possible intermittent torsion of the left testicle since Wednesday.  Manual detorsion was attempted by me however after a few seconds of reduction, testicle was once again spontaneously high riding.  Cannot exclude epididymitis, orchitis at this time.  GC/chlamydia urine test currently pending.  Currently pending testicle and scrotal ultrasound.  UA unremarkable, micro pending.  Patient's care was signed out to ED resident Dr. Wilcox and ED attending Dr. Jc. Pt understands and agreed with plan. All questions answered. No other concerns.              Disposition  Final diagnoses:   None     ED Disposition       None          Follow-up Information    None         Patient's Medications   Discharge Prescriptions    No medications on file     No discharge procedures on file.    PDMP Review         Value Time User    PDMP Reviewed  Yes 1/11/2023  5:51 AM Gennaro Vega MD             ED Provider  Attending physically available and evaluated Edd  Pilar. I managed the patient along with the ED Attending.    Electronically Signed by           Daniela Zimmerman MD  02/19/24 0667

## 2024-02-19 NOTE — Clinical Note
Edd Quezada was seen and treated in our emergency department on 2/19/2024.    No restrictions            Diagnosis:     Edd  may return to work on return date.    He may return on this date: 02/26/2024         If you have any questions or concerns, please don't hesitate to call.      Zion Jc, DO    ______________________________           _______________          _______________  Hospital Representative                              Date                                Time

## 2024-02-19 NOTE — ED NOTES
PT awake and alert, no distress noted. No other questions upon d/c.     Annie Garcia RN  02/19/24 0477

## 2024-02-20 LAB
C TRACH DNA SPEC QL NAA+PROBE: NEGATIVE
N GONORRHOEA DNA SPEC QL NAA+PROBE: NEGATIVE

## 2024-02-21 PROBLEM — R50.9 FUO (FEVER OF UNKNOWN ORIGIN): Status: RESOLVED | Noted: 2020-11-12 | Resolved: 2024-02-21

## 2024-05-11 ENCOUNTER — HOSPITAL ENCOUNTER (EMERGENCY)
Facility: HOSPITAL | Age: 49
Discharge: HOME | End: 2024-05-12
Attending: EMERGENCY MEDICINE

## 2024-05-11 DIAGNOSIS — F13.939 BENZODIAZEPINE WITHDRAWAL (HCC): Primary | ICD-10-CM

## 2024-05-11 DIAGNOSIS — R03.0 ELEVATED BP WITHOUT DIAGNOSIS OF HYPERTENSION: ICD-10-CM

## 2024-05-11 PROCEDURE — 99283 EMERGENCY DEPT VISIT LOW MDM: CPT

## 2024-05-12 VITALS
HEART RATE: 87 BPM | WEIGHT: 163.36 LBS | RESPIRATION RATE: 20 BRPM | BODY MASS INDEX: 22.16 KG/M2 | TEMPERATURE: 97.6 F | SYSTOLIC BLOOD PRESSURE: 149 MMHG | DIASTOLIC BLOOD PRESSURE: 76 MMHG | OXYGEN SATURATION: 99 %

## 2024-05-12 PROCEDURE — 96361 HYDRATE IV INFUSION ADD-ON: CPT

## 2024-05-12 PROCEDURE — 99284 EMERGENCY DEPT VISIT MOD MDM: CPT | Performed by: EMERGENCY MEDICINE

## 2024-05-12 PROCEDURE — 96374 THER/PROPH/DIAG INJ IV PUSH: CPT

## 2024-05-12 RX ORDER — ALPRAZOLAM 0.25 MG/1
1 TABLET ORAL ONCE
Status: COMPLETED | OUTPATIENT
Start: 2024-05-12 | End: 2024-05-12

## 2024-05-12 RX ORDER — ONDANSETRON 2 MG/ML
4 INJECTION INTRAMUSCULAR; INTRAVENOUS ONCE
Status: COMPLETED | OUTPATIENT
Start: 2024-05-12 | End: 2024-05-12

## 2024-05-12 RX ADMIN — ALPRAZOLAM 1 MG: 0.25 TABLET ORAL at 00:30

## 2024-05-12 RX ADMIN — ONDANSETRON 4 MG: 2 INJECTION INTRAMUSCULAR; INTRAVENOUS at 00:37

## 2024-05-12 RX ADMIN — SODIUM CHLORIDE 1000 ML: 0.9 INJECTION, SOLUTION INTRAVENOUS at 00:39

## 2024-05-12 NOTE — ED PROVIDER NOTES
History  Chief Complaint   Patient presents with    Seizure - Prior Hx Of     Patient reports seizure like activity today. States he was supposed to  script for alprazolam today but was unable to. States began feeling flushed and blurred vision and dizziness tonight. States similar to past pre seizure a[place].      Patient is a 48-year-old male with a past medical history significant for chronic back pain, vertigo, anxiety for which he takes 1 mg Xanax 3 times a day.  Patient last filled his prescription on 04/16/2 for per PDMP.  He reports a history of seizure-like activity whenever he does not take his Xanax.  Patient states that he ran out of his Xanax and did not take his evening dose yesterday.  Today he was unable to go to the pharmacy and had 3 missed doses of his 1 mg Xanax.  Is the only medication he missed.  Patient reports progressive anxiety, tremor, restlessness throughout the day.  His wife reports that at home he had significant bilateral upper extremity shaking.  He did not lose consciousness.  He reports small amount of urinary incontinence which she has had in the past during this episode of shaking.  He presents anxious.  He otherwise denies any regular alcohol use or other recreational substance use.        Prior to Admission Medications   Prescriptions Last Dose Informant Patient Reported? Taking?   ALPRAZolam (XANAX) 1 mg tablet   Yes No   Sig: Take 1 mg by mouth 3 (three) times a day   Buprenorphine HCl-Naloxone HCl 12-3 MG FILM   Yes No   Sig: Place 12 mg under the tongue 3 (three) times a day   acetaminophen (TYLENOL) 325 mg tablet   Yes No   Sig: Take 650 mg by mouth every 6 (six) hours as needed for mild pain   calcium carbonate (TUMS) 500 mg chewable tablet   Yes No   Sig: Chew 2 tablets 2 (two) times a day   citalopram (CeleXA) 40 mg tablet   Yes No   Sig: Take 40 mg by mouth daily   meclizine (ANTIVERT) 25 mg tablet   No No   Sig: Take 1 tablet (25 mg total) by mouth 3 (three)  times a day as needed for dizziness   naproxen (NAPROSYN) 500 mg tablet   No No   Sig: Take 1 tablet (500 mg total) by mouth 2 (two) times a day as needed for mild pain   ondansetron (ZOFRAN) 4 mg tablet   No No   Sig: Take 1 tablet (4 mg total) by mouth every 6 (six) hours   pantoprazole (PROTONIX) 40 mg tablet   No No   Sig: Take 1 tablet (40 mg total) by mouth daily   Patient not taking: Reported on 11/12/2020   tobramycin-dexamethasone (TOBRADEX) ophthalmic suspension   No No   Sig: Administer 1 drop to the right eye every 4 (four) hours while awake      Facility-Administered Medications: None       Past Medical History:   Diagnosis Date    Low back pain with sciatica     Vertigo        Past Surgical History:   Procedure Laterality Date    ESOPHAGOGASTRODUODENOSCOPY N/A     KNEE ARTHROSCOPY Right     [place] BX/EXC LYMPH NODE OPEN SUPERFICIAL Left 1/24/2017    Procedure: INGUINAL LYMPH NODE BIOPSY WITH FLOW CYTOMETRY;  Surgeon: Steffen Munguia DO;  Location: [facility];  Service: General       History reviewed. No pertinent family history.  I have reviewed and agree with the history as documented.    E-Cigarette/Vaping     E-Cigarette/Vaping Substances    Nicotine Yes      Social History     Tobacco Use    Smoking status: Former     Current packs/day: 1.00     Average packs/day: 1 pack/day for 25.0 years (25.0 ttl pk-yrs)     Types: Cigarettes    Smokeless tobacco: Current    Tobacco comments:     Vape   Substance Use Topics    Alcohol use: Yes     Comment: rarely    Drug use: No        Review of Systems   Constitutional:  Negative for chills and fever.   HENT:  Negative for ear pain and sore throat.    Eyes:  Negative for pain and visual disturbance.   Respiratory:  Negative for cough and shortness of breath.    Cardiovascular:  Negative for chest pain and palpitations.   Gastrointestinal:  Negative for abdominal pain and vomiting.   Genitourinary:  Negative for dysuria and hematuria.   Musculoskeletal:  Negative  for arthralgias and back pain.   Skin:  Negative for color change and rash.   Neurological:  Positive for tremors. Negative for seizures and syncope.   Psychiatric/Behavioral:  The patient is nervous/anxious.    All other systems reviewed and are negative.      Physical Exam  ED Triage Vitals   Temperature Pulse Respirations Blood Pressure SpO2   05/12/24 0003 05/12/24 0002 05/12/24 0002 05/12/24 0002 05/12/24 0002   97.6 °F (36.4 °C) 87 20 149/76 99 %      Temp Source Heart Rate Source Patient Position - Orthostatic VS BP Location FiO2 (%)   05/12/24 0003 05/12/24 0002 05/12/24 0002 05/12/24 0002 --   Oral Monitor Sitting Left arm       Pain Score       --                    Orthostatic Vital Signs  Vitals:    05/12/24 0002   BP: 149/76   Pulse: 87   Patient Position - Orthostatic VS: Sitting       Physical Exam  Vitals and nursing note reviewed.   Constitutional:       General: He is not in acute distress.     Appearance: Normal appearance. He is not ill-appearing, toxic-appearing or diaphoretic.   HENT:      Head: Normocephalic and atraumatic.   Eyes:      General: No scleral icterus.        Right eye: No discharge.         Left eye: No discharge.      Extraocular Movements: Extraocular movements intact.      Conjunctiva/sclera: Conjunctivae normal.   Cardiovascular:      Rate and Rhythm: Normal rate.      Pulses: Normal pulses.      Heart sounds: Normal heart sounds. No murmur heard.     No friction rub. No gallop.   Pulmonary:      Effort: Pulmonary effort is normal. No respiratory distress.      Breath sounds: Normal breath sounds. No stridor. No wheezing, rhonchi or rales.   Abdominal:      General: Abdomen is flat. Bowel sounds are normal. There is no distension.      Palpations: Abdomen is soft.      Tenderness: There is no abdominal tenderness. There is no guarding or rebound.   Musculoskeletal:         General: No swelling. Normal range of motion.      Cervical back: Normal range of motion. No rigidity.       Right lower leg: No edema.      Left lower leg: No edema.   Skin:     General: Skin is warm and dry.      Capillary Refill: Capillary refill takes less than 2 seconds.      Coloration: Skin is not jaundiced.      Findings: No bruising or lesion.   Neurological:      General: No focal deficit present.      Mental Status: He is alert and oriented to person, place, and time. Mental status is at baseline.      Comments: Mild bilateral hand tremor   Psychiatric:         Mood and Affect: Mood normal.         Behavior: Behavior normal.         Thought Content: Thought content normal.         Judgment: Judgment normal.      Comments: PT endorsing significant anxiety         ED Medications  Medications   ALPRAZolam (XANAX) tablet 1 mg (1 mg Oral Given 5/12/24 0030)   ondansetron (ZOFRAN) injection 4 mg (4 mg Intravenous Given 5/12/24 0037)   sodium chloride 0.9 % bolus 1,000 mL (0 mL Intravenous Stopped 5/12/24 0204)       Diagnostic Studies  Results Reviewed       None                   No orders to display         Procedures  Procedures      ED Course                             SBIRT 20yo+      Flowsheet Row Most Recent Value   Initial Alcohol Screen: US AUDIT-C     1. How often do you have a drink containing alcohol? 0 Filed at: 05/12/2024 0001   2. How many drinks containing alcohol do you have on a typical day you are drinking?  0 Filed at: 05/12/2024 0001   3a. Male UNDER 65: How often do you have five or more drinks on one occasion? 0 Filed at: 05/12/2024 0001   3b. FEMALE Any Age, or MALE 65+: How often do you have 4 or more drinks on one occassion? 0 Filed at: 05/12/2024 0001   Audit-C Score 0 Filed at: 05/12/2024 0001   FINA: How many times in the past year have you...    Used an illegal drug or used a prescription medication for non-medical reasons? Never Filed at: 05/12/2024 0001                  Medical Decision Making  48y M presenting to the emergency department for evaluation of seizure-like activity.  Patient does have significant anxiety, tremor upon arrival. Given his clinical history I believe this is consistent with benzodiazepine withdrawal. Patient denies any recreational substance use, reducing concern for EtOH withdrawal. PDMP was checked as documented in HPI, appears to have appropriate filling practices. Patient given 1 mg xanax in the ED with resolution of symptoms. He received some zofran and IVF and reports marked symptomatic improvement. Instructed patient to fill alprazolam rx this am. He verbalized understanding. Patient was d/c home in good condition.    Risk  Prescription drug management.          Disposition  Final diagnoses:   Benzodiazepine withdrawal (HCC)   Elevated BP without diagnosis of hypertension     Time reflects when diagnosis was documented in both MDM as applicable and the Disposition within this note       Time User Action Codes Description Comment    5/12/2024  1:20 AM Hakan Mcbride Add [F13.939] Benzodiazepine withdrawal (HCC)     5/12/2024  1:20 AM Hakan Mcbride Add [R03.0] Elevated BP without diagnosis of hypertension           ED Disposition       ED Disposition   Discharge    Condition   Stable    Date/Time   Sun May 12, 2024 0119    Comment   Edd Quezada discharge to home/self care.                   Follow-up Information       Follow up With Specialties Details Why Contact Info Additional Information    Remi Mckay,  Family Medicine   [address] [facility] [id number] [phone]       [facility] Emergency Department Emergency Medicine Go to  As needed, for new/worsening symptoms. [facility] [address]  [phone] [facility] Emergency Department, [address] [place] [address], [id number]            Discharge Medication List as of 5/12/2024  1:20 AM        CONTINUE these medications which have NOT CHANGED    Details   acetaminophen (TYLENOL) 325 mg tablet Take 650 mg by  mouth every 6 (six) hours as needed for mild pain, Until Discontinued, Historical Med      ALPRAZolam (XANAX) 1 mg tablet Take 1 mg by mouth 3 (three) times a day, Historical Med      Buprenorphine HCl-Naloxone HCl 12-3 MG FILM Place 12 mg under the tongue 3 (three) times a day, Historical Med      calcium carbonate (TUMS) 500 mg chewable tablet Chew 2 tablets 2 (two) times a day, Until Discontinued, Historical Med      citalopram (CeleXA) 40 mg tablet Take 40 mg by mouth daily, Historical Med      meclizine (ANTIVERT) 25 mg tablet Take 1 tablet (25 mg total) by mouth 3 (three) times a day as needed for dizziness, Starting Wed 1/11/2023, Normal      naproxen (NAPROSYN) 500 mg tablet Take 1 tablet (500 mg total) by mouth 2 (two) times a day as needed for mild pain, Starting Mon 2/19/2024, Normal      ondansetron (ZOFRAN) 4 mg tablet Take 1 tablet (4 mg total) by mouth every 6 (six) hours, Starting Wed 1/11/2023, Normal      pantoprazole (PROTONIX) 40 mg tablet Take 1 tablet (40 mg total) by mouth daily, Starting Tue 10/6/2020, Normal      tobramycin-dexamethasone (TOBRADEX) ophthalmic suspension Administer 1 drop to the right eye every 4 (four) hours while awake, Starting Mon 1/9/2023, Normal           No discharge procedures on file.    PDMP Review         Value Time User    PDMP Reviewed  Yes 5/11/2024 11:57 PM Gennaro Vega MD             ED Provider  Attending physically available and evaluated Edd Quezada. I managed the patient along with the ED Attending.    Electronically Signed by           Hakan Mcbride DO  05/12/24 4636

## 2024-05-12 NOTE — ED ATTENDING ATTESTATION
5/11/2024  I, Gennaro Vega MD, saw and evaluated the patient. I have discussed the patient with the resident/non-physician practitioner and agree with the resident's/non-physician practitioner's findings, Plan of Care, and MDM as documented in the resident's/non-physician practitioner's note, except where noted. All available labs and Radiology studies were reviewed.  I was present for key portions of any procedure(s) performed by the resident/non-physician practitioner and I was immediately available to provide assistance.       At this point I agree with the current assessment done in the Emergency Department.  I have conducted an independent evaluation of this patient a history and physical is as follows: Patient is a 48 year old male with shaking tonight and ran out of his xanax yesterday and could not get to the pharmacy yesterday to  his Rx for xanax. No LOC or seizure tonight. No SI. No fever. (+) nausea. (+) anxiety. Was lats seen in this ED on 2/19/24 for left orchitis. [place] website checked on this patient and last Rx filled was on 4/16/24 for suboxone for 30 day supply. NCAT. Moist mucous membranes. EENT exam as per ED resident. Lungs clear. Heart regular without murmur. Abdomen soft and nontender. Good bowel sounds. No edema. No rash noted. No gross focal deficits. (+) anxious. DDx including but not limited to: anxiety, panic attack, medication withdrawal; doubt suicidal ideation or metabolic abnormality or cardiac etiology or PE. Will give xanax.     ED Course         Critical Care Time  Procedures

## 2024-05-13 ENCOUNTER — HOSPITAL ENCOUNTER (EMERGENCY)
Facility: HOSPITAL | Age: 49
Discharge: HOME | End: 2024-05-13
Attending: EMERGENCY MEDICINE | Admitting: EMERGENCY MEDICINE

## 2024-05-13 VITALS
HEART RATE: 68 BPM | WEIGHT: 163.36 LBS | HEIGHT: 72 IN | BODY MASS INDEX: 22.13 KG/M2 | OXYGEN SATURATION: 96 % | SYSTOLIC BLOOD PRESSURE: 112 MMHG | RESPIRATION RATE: 20 BRPM | DIASTOLIC BLOOD PRESSURE: 62 MMHG | TEMPERATURE: 98.3 F

## 2024-05-13 DIAGNOSIS — Z76.0 MEDICATION REFILL: Primary | ICD-10-CM

## 2024-05-13 PROCEDURE — 99282 EMERGENCY DEPT VISIT SF MDM: CPT

## 2024-05-13 PROCEDURE — 99284 EMERGENCY DEPT VISIT MOD MDM: CPT | Performed by: PHYSICIAN ASSISTANT

## 2024-05-13 RX ORDER — ALPRAZOLAM 0.25 MG/1
1 TABLET ORAL ONCE
Status: COMPLETED | OUTPATIENT
Start: 2024-05-13 | End: 2024-05-13

## 2024-05-13 RX ORDER — ALPRAZOLAM 1 MG/1
1 TABLET ORAL 3 TIMES DAILY PRN
Qty: 5 TABLET | Refills: 0 | Status: SHIPPED | OUTPATIENT
Start: 2024-05-13 | End: 2024-05-14

## 2024-05-13 RX ADMIN — ALPRAZOLAM 1 MG: 0.25 TABLET ORAL at 12:27

## 2024-05-13 NOTE — ED PROVIDER NOTES
History  Chief Complaint   Patient presents with    Medication Problem     Patient is out of his xanax since Friday, patient was seen here on Saturday received his dose and script was sent to pharmacy. Patient was unable to  prescription d/t copay was to much  Patient reports he gets seizures without his dose. Missed yesterday and today  Patient very anxious during triage      48-year-old male presents the emergency department with request for medication administration.  States that he ran out of his Xanax that he takes routinely 3 days ago.  States that he takes 1 mg 3 times daily.  Was seen in the emergency department 2 days ago and given 1 dose here.  States that his routine provider has faxed a prescription to his pharmacy but that he is in between insurance carriers right now due to getting a new job and is unable to afford the prescription out-of-pocket.  States that he should be getting paid tomorrow will be able to  the prescription sometime after 3 PM.  Questing a dose of the medication in the emergency department due to feeling jittery, anxious, and having some shortness of breath.      History provided by:  Patient   used: No        Prior to Admission Medications   Prescriptions Last Dose Informant Patient Reported? Taking?   ALPRAZolam (XANAX) 1 mg tablet Past Week  Yes Yes   Sig: Take 1 mg by mouth 3 (three) times a day   Buprenorphine HCl-Naloxone HCl 12-3 MG FILM 5/13/2024  Yes Yes   Sig: Place 8 mg under the tongue once   acetaminophen (TYLENOL) 325 mg tablet   Yes Yes   Sig: Take 650 mg by mouth every 6 (six) hours as needed for mild pain   calcium carbonate (TUMS) 500 mg chewable tablet   Yes Yes   Sig: Chew 2 tablets 2 (two) times a day   citalopram (CeleXA) 40 mg tablet Past Week  Yes Yes   Sig: Take 40 mg by mouth daily   meclizine (ANTIVERT) 25 mg tablet Not Taking  No No   Sig: Take 1 tablet (25 mg total) by mouth 3 (three) times a day as needed for dizziness    Patient not taking: Reported on 5/13/2024   naproxen (NAPROSYN) 500 mg tablet Not Taking  No No   Sig: Take 1 tablet (500 mg total) by mouth 2 (two) times a day as needed for mild pain   Patient not taking: Reported on 5/13/2024   ondansetron (ZOFRAN) 4 mg tablet Not Taking  No No   Sig: Take 1 tablet (4 mg total) by mouth every 6 (six) hours   Patient not taking: Reported on 5/13/2024   pantoprazole (PROTONIX) 40 mg tablet Not Taking  No No   Sig: Take 1 tablet (40 mg total) by mouth daily   Patient not taking: Reported on 11/12/2020   tobramycin-dexamethasone (TOBRADEX) ophthalmic suspension Not Taking  No No   Sig: Administer 1 drop to the right eye every 4 (four) hours while awake   Patient not taking: Reported on 5/13/2024      Facility-Administered Medications: None       Past Medical History:   Diagnosis Date    Low back pain with sciatica     Vertigo        Past Surgical History:   Procedure Laterality Date    ESOPHAGOGASTRODUODENOSCOPY N/A     KNEE ARTHROSCOPY Right     ID BX/EXC LYMPH NODE OPEN SUPERFICIAL Left 1/24/2017    Procedure: INGUINAL LYMPH NODE BIOPSY WITH FLOW CYTOMETRY;  Surgeon: Steffen Munguia DO;  Location: [facility];  Service: General       History reviewed. No pertinent family history.  I have reviewed and agree with the history as documented.    E-Cigarette/Vaping     E-Cigarette/Vaping Substances    Nicotine Yes      Social History     Tobacco Use    Smoking status: Former     Current packs/day: 1.00     Average packs/day: 1 pack/day for 25.0 years (25.0 ttl pk-yrs)     Types: Cigarettes    Smokeless tobacco: Current    Tobacco comments:     Vape   Substance Use Topics    Alcohol use: Yes     Comment: rarely    Drug use: No       Review of Systems   Constitutional:  Negative for chills and fever.   Respiratory:  Positive for shortness of breath. Negative for cough.    Cardiovascular:  Negative for chest pain.   Musculoskeletal:  Negative for arthralgias and back pain.   Skin:   Negative for rash and wound.   Psychiatric/Behavioral:  The patient is nervous/anxious.    All other systems reviewed and are negative.      Physical Exam  Physical Exam  Vitals and nursing note reviewed.   Constitutional:       Appearance: He is well-developed.   HENT:      Head: Normocephalic and atraumatic.   Cardiovascular:      Rate and Rhythm: Normal rate and regular rhythm.   Pulmonary:      Effort: Pulmonary effort is normal. No respiratory distress.      Breath sounds: No wheezing, rhonchi or rales.   Skin:     General: Skin is warm and dry.   Neurological:      General: No focal deficit present.      Mental Status: He is alert and oriented to person, place, and time.   Psychiatric:         Mood and Affect: Mood normal.         Behavior: Behavior normal.         Vital Signs  ED Triage Vitals [05/13/24 1037]   Temperature Pulse Respirations Blood Pressure SpO2   98.3 °F (36.8 °C) 88 20 145/93 99 %      Temp src Heart Rate Source Patient Position - Orthostatic VS BP Location FiO2 (%)   -- -- -- -- --      Pain Score       9           Vitals:    05/13/24 1037 05/13/24 1045 05/13/24 1100   BP: 145/93 153/77 112/62   Pulse: 88 73 68         Visual Acuity      ED Medications  Medications   ALPRAZolam (XANAX) tablet 1 mg (1 mg Oral Given 5/13/24 1227)       Diagnostic Studies  Results Reviewed       None                   No orders to display              Procedures  Procedures         ED Course                               SBIRT 20yo+      Flowsheet Row Most Recent Value   Initial Alcohol Screen: US AUDIT-C     1. How often do you have a drink containing alcohol? 0 Filed at: 05/13/2024 1041   2. How many drinks containing alcohol do you have on a typical day you are drinking?  0 Filed at: 05/13/2024 1041   3a. Male UNDER 65: How often do you have five or more drinks on one occasion? 0 Filed at: 05/13/2024 1041   3b. FEMALE Any Age, or MALE 65+: How often do you have 4 or more drinks on one occassion? 0 Filed at:  05/13/2024 1041   Audit-C Score 0 Filed at: 05/13/2024 1041   FINA: How many times in the past year have you...    Used an illegal drug or used a prescription medication for non-medical reasons? Never Filed at: 05/13/2024 1041                      Medical Decision Making  Differential diagnosis includes but not limited to: anxiety, encounter for medication refill, early benzo withdrawal     Problems Addressed:  Medication refill: acute illness or injury    Risk  Prescription drug management.             Disposition  Final diagnoses:   Medication refill     Time reflects when diagnosis was documented in both MDM as applicable and the Disposition within this note       Time User Action Codes Description Comment    5/13/2024 12:13 PM Hope Cordero Add [Z76.0] Medication refill           ED Disposition       ED Disposition   Discharge    Condition   Stable    Date/Time   Mon May 13, 2024 1213    Comment   Edd Quezada discharge to home/self care.                   Follow-up Information       Follow up With Specialties Details Why Contact Rustam Mckay DO Family Medicine Schedule an appointment as soon as possible for a visit   [address] [place] [address] [id number] [phone]              Discharge Medication List as of 5/13/2024 12:49 PM        START taking these medications    Details   !! ALPRAZolam (XANAX) 1 mg tablet Take 1 tablet (1 mg total) by mouth 3 (three) times a day as needed for anxiety for up to 1 day, Starting Mon 5/13/2024, Until Tue 5/14/2024 at 2359, Normal       !! - Potential duplicate medications found. Please discuss with provider.        CONTINUE these medications which have NOT CHANGED    Details   acetaminophen (TYLENOL) 325 mg tablet Take 650 mg by mouth every 6 (six) hours as needed for mild pain, Until Discontinued, Historical Med      !! ALPRAZolam (XANAX) 1 mg tablet Take 1 mg by mouth 3 (three) times a day, Historical Med      Buprenorphine HCl-Naloxone HCl 12-3 MG FILM Place  8 mg under the tongue once, Historical Med      calcium carbonate (TUMS) 500 mg chewable tablet Chew 2 tablets 2 (two) times a day, Until Discontinued, Historical Med      citalopram (CeleXA) 40 mg tablet Take 40 mg by mouth daily, Historical Med      meclizine (ANTIVERT) 25 mg tablet Take 1 tablet (25 mg total) by mouth 3 (three) times a day as needed for dizziness, Starting Wed 1/11/2023, Normal      naproxen (NAPROSYN) 500 mg tablet Take 1 tablet (500 mg total) by mouth 2 (two) times a day as needed for mild pain, Starting Mon 2/19/2024, Normal      ondansetron (ZOFRAN) 4 mg tablet Take 1 tablet (4 mg total) by mouth every 6 (six) hours, Starting Wed 1/11/2023, Normal      pantoprazole (PROTONIX) 40 mg tablet Take 1 tablet (40 mg total) by mouth daily, Starting Tue 10/6/2020, Normal      tobramycin-dexamethasone (TOBRADEX) ophthalmic suspension Administer 1 drop to the right eye every 4 (four) hours while awake, Starting Mon 1/9/2023, Normal       !! - Potential duplicate medications found. Please discuss with provider.          No discharge procedures on file.    PDMP Review         Value Time User    PDMP Reviewed  Yes 5/11/2024 11:57 PM Gennaro Vega MD            ED Provider  Electronically Signed by             Hope Cordero PA-C  05/13/24 [id number]

## 2024-05-28 ENCOUNTER — TELEPHONE (OUTPATIENT)
Dept: SURGERY | Facility: CLINIC | Age: 49
End: 2024-05-28

## 2024-06-21 ENCOUNTER — HOSPITAL ENCOUNTER (EMERGENCY)
Facility: HOSPITAL | Age: 49
Discharge: HOME | End: 2024-06-21
Attending: EMERGENCY MEDICINE

## 2024-06-21 ENCOUNTER — APPOINTMENT (EMERGENCY)
Dept: CT IMAGING | Facility: HOSPITAL | Age: 49
End: 2024-06-21

## 2024-06-21 VITALS
HEART RATE: 88 BPM | OXYGEN SATURATION: 97 % | SYSTOLIC BLOOD PRESSURE: 137 MMHG | DIASTOLIC BLOOD PRESSURE: 83 MMHG | RESPIRATION RATE: 18 BRPM | TEMPERATURE: 97.8 F

## 2024-06-21 DIAGNOSIS — S09.90XA MINOR HEAD INJURY, INITIAL ENCOUNTER: ICD-10-CM

## 2024-06-21 DIAGNOSIS — S61.411A LACERATION OF RIGHT HAND WITHOUT FOREIGN BODY, INITIAL ENCOUNTER: Primary | ICD-10-CM

## 2024-06-21 DIAGNOSIS — Z76.89 ENCOUNTER FOR MEDICATION ADMINISTRATION: ICD-10-CM

## 2024-06-21 PROCEDURE — 70450 CT HEAD/BRAIN W/O DYE: CPT

## 2024-06-21 PROCEDURE — 99284 EMERGENCY DEPT VISIT MOD MDM: CPT

## 2024-06-21 PROCEDURE — 12002 RPR S/N/AX/GEN/TRNK2.6-7.5CM: CPT | Performed by: PHYSICIAN ASSISTANT

## 2024-06-21 PROCEDURE — 99284 EMERGENCY DEPT VISIT MOD MDM: CPT | Performed by: PHYSICIAN ASSISTANT

## 2024-06-21 PROCEDURE — 90471 IMMUNIZATION ADMIN: CPT

## 2024-06-21 PROCEDURE — 90715 TDAP VACCINE 7 YRS/> IM: CPT | Performed by: PHYSICIAN ASSISTANT

## 2024-06-21 RX ORDER — ALPRAZOLAM 0.5 MG/1
1 TABLET ORAL ONCE
Status: COMPLETED | OUTPATIENT
Start: 2024-06-21 | End: 2024-06-21

## 2024-06-21 RX ORDER — LIDOCAINE HYDROCHLORIDE 10 MG/ML
10 INJECTION, SOLUTION EPIDURAL; INFILTRATION; INTRACAUDAL; PERINEURAL ONCE
Status: COMPLETED | OUTPATIENT
Start: 2024-06-21 | End: 2024-06-21

## 2024-06-21 RX ADMIN — BUPRENORPHINE AND NALOXONE 12 MG: 8; 2 FILM BUCCAL; SUBLINGUAL at 15:33

## 2024-06-21 RX ADMIN — LIDOCAINE HYDROCHLORIDE 10 ML: 10 INJECTION, SOLUTION EPIDURAL; INFILTRATION; INTRACAUDAL; PERINEURAL at 14:52

## 2024-06-21 RX ADMIN — ALPRAZOLAM 1 MG: 0.5 TABLET ORAL at 15:30

## 2024-06-21 RX ADMIN — TETANUS TOXOID, REDUCED DIPHTHERIA TOXOID AND ACELLULAR PERTUSSIS VACCINE, ADSORBED 0.5 ML: 5; 2.5; 8; 8; 2.5 SUSPENSION INTRAMUSCULAR at 14:54

## 2024-06-21 NOTE — ED PROVIDER NOTES
History  Chief Complaint   Patient presents with    Hand Laceration     Pt reports cutting R hand with , unsure of last tetanus    Fall     Pt fell after cutting hand, hit head off of concrete, unsure of LOC, no thinners. Pt c/o frontal headache, dizziness     This is a 49-year-old male presenting to the emergency department today for a laceration to the dorsal aspect of the right hand.  The patient notes that he was using a  just prior to arrival and he sustained the laceration to the dorsal aspect of the right hand.  Bleeding is controlled on arrival.  When the patient saw the bleeding, the patient subsequently felt lightheaded and fell striking the forehead on the ground.  He denies loss of consciousness.  He notes a mild headache and without any dizziness or lightheadedness.  He is uncertain when his last tetanus vaccination is.  He denies any numbness or tingling.  He denies any injuries anywhere else.  He denies any neck pain.  Patient denies other complaints at this time.      History provided by:  Patient   used: No    Finger Laceration  Location: right hand.  Length:  3cm  Depth:  Through dermis  Quality comment:  Curved  Bleeding: venous    Time since incident: just PTA.  Injury mechanism: .  Foreign body present:  No foreign bodies  Relieved by:  Nothing  Worsened by:  Movement  Ineffective treatments:  None tried  Tetanus status:  Out of date  Associated symptoms: no fever, no focal weakness, no numbness, no rash, no redness, no swelling and no streaking        Prior to Admission Medications   Prescriptions Last Dose Informant Patient Reported? Taking?   ALPRAZolam (XANAX) 1 mg tablet   Yes No   Sig: Take 1 mg by mouth 3 (three) times a day   ALPRAZolam (XANAX) 1 mg tablet   No No   Sig: Take 1 tablet (1 mg total) by mouth 3 (three) times a day as needed for anxiety for up to 1 day   Buprenorphine HCl-Naloxone HCl 12-3 MG FILM   Yes No   Sig: Place 8 mg  under the tongue once   acetaminophen (TYLENOL) 325 mg tablet   Yes No   Sig: Take 650 mg by mouth every 6 (six) hours as needed for mild pain   calcium carbonate (TUMS) 500 mg chewable tablet   Yes No   Sig: Chew 2 tablets 2 (two) times a day   citalopram (CeleXA) 40 mg tablet   Yes No   Sig: Take 40 mg by mouth daily   meclizine (ANTIVERT) 25 mg tablet   No No   Sig: Take 1 tablet (25 mg total) by mouth 3 (three) times a day as needed for dizziness   Patient not taking: Reported on 5/13/2024   naproxen (NAPROSYN) 500 mg tablet   No No   Sig: Take 1 tablet (500 mg total) by mouth 2 (two) times a day as needed for mild pain   Patient not taking: Reported on 5/13/2024   ondansetron (ZOFRAN) 4 mg tablet   No No   Sig: Take 1 tablet (4 mg total) by mouth every 6 (six) hours   Patient not taking: Reported on 5/13/2024   pantoprazole (PROTONIX) 40 mg tablet   No No   Sig: Take 1 tablet (40 mg total) by mouth daily   Patient not taking: Reported on 11/12/2020   tobramycin-dexamethasone (TOBRADEX) ophthalmic suspension   No No   Sig: Administer 1 drop to the right eye every 4 (four) hours while awake   Patient not taking: Reported on 5/13/2024      Facility-Administered Medications: None       Past Medical History:   Diagnosis Date    Low back pain with sciatica     Vertigo        Past Surgical History:   Procedure Laterality Date    ESOPHAGOGASTRODUODENOSCOPY N/A     KNEE ARTHROSCOPY Right     MN BX/EXC LYMPH NODE OPEN SUPERFICIAL Left 1/24/2017    Procedure: INGUINAL LYMPH NODE BIOPSY WITH FLOW CYTOMETRY;  Surgeon: Steffen Munguia DO;  Location: [facility];  Service: General       History reviewed. No pertinent family history.  I have reviewed and agree with the history as documented.    E-Cigarette/Vaping     E-Cigarette/Vaping Substances    Nicotine Yes      Social History     Tobacco Use    Smoking status: Former     Current packs/day: 1.00     Average packs/day: 1 pack/day for 25.0 years (25.0 ttl pk-yrs)     Types:  Cigarettes    Smokeless tobacco: Current    Tobacco comments:     Vape   Substance Use Topics    Alcohol use: Yes     Comment: rarely    Drug use: No       Review of Systems   Constitutional:  Negative for appetite change, chills, diaphoresis, fatigue and fever.   Eyes:  Negative for visual disturbance.   Respiratory:  Negative for cough, chest tightness, shortness of breath and wheezing.    Cardiovascular:  Negative for chest pain, palpitations and leg swelling.   Gastrointestinal:  Negative for abdominal pain, constipation, diarrhea, nausea and vomiting.   Musculoskeletal:  Negative for neck pain and neck stiffness.   Skin:  Positive for wound. Negative for rash.   Neurological:  Positive for headaches. Negative for dizziness, focal weakness, seizures, syncope, weakness, light-headedness and numbness.   Psychiatric/Behavioral:  Negative for confusion.    All other systems reviewed and are negative.      Physical Exam  Physical Exam  Vitals and nursing note reviewed.   Constitutional:       General: He is not in acute distress.     Appearance: Normal appearance. He is normal weight. He is not ill-appearing, toxic-appearing or diaphoretic.   HENT:      Head: Normocephalic.      Comments: Hematoma to the left forehead.     Right Ear: Tympanic membrane, ear canal and external ear normal. There is no impacted cerumen.      Left Ear: Tympanic membrane, ear canal and external ear normal. There is no impacted cerumen.      Ears:      Comments: No hemotympanum or Franco sign bilaterally.     Nose: Nose normal. No congestion or rhinorrhea.      Mouth/Throat:      Mouth: Mucous membranes are moist.      Pharynx: No oropharyngeal exudate or posterior oropharyngeal erythema.   Eyes:      General: No scleral icterus.        Right eye: No discharge.         Left eye: No discharge.      Conjunctiva/sclera: Conjunctivae normal.      Comments: Negative raccoon eyes bilaterally.   Neck:      Comments: No tenderness to palpation to  the midline cervical spine or bilateral paracervical musculature.  No step-offs or deformities.  Cardiovascular:      Rate and Rhythm: Normal rate and regular rhythm.      Pulses: Normal pulses.      Heart sounds: Normal heart sounds. No murmur heard.     No friction rub. No gallop.   Pulmonary:      Effort: Pulmonary effort is normal. No respiratory distress.      Breath sounds: Normal breath sounds. No stridor. No wheezing, rhonchi or rales.   Chest:      Chest wall: No tenderness.   Musculoskeletal:         General: Normal range of motion.      Cervical back: Normal range of motion. No rigidity.      Right lower leg: No edema.      Left lower leg: No edema.      Comments: Normal range of motion to flexion and extension of the right index finger.  Normal range of motion to flexion, extension, and opposition of the right thumb.  No numbness or tingling to the distal aspect of the right thumb or right index finger.   Skin:     General: Skin is warm and dry.      Capillary Refill: Capillary refill takes less than 2 seconds.      Coloration: Skin is not jaundiced or pale.      Comments: 3 cm curved laceration to the dorsal aspect of the right hand between the right thumb and right index finger.  Bleeding controlled on arrival.   Neurological:      General: No focal deficit present.      Mental Status: He is alert and oriented to person, place, and time. Mental status is at baseline.      Comments: 5 out of 5  strength to the bilateral hands.  Normal sensation to the distal right thumb and right index finger.   Psychiatric:         Mood and Affect: Mood normal.         Behavior: Behavior normal.         Vital Signs  ED Triage Vitals   Temperature Pulse Respirations Blood Pressure SpO2   06/21/24 1413 06/21/24 1416 06/21/24 1416 06/21/24 1416 06/21/24 1416   97.8 °F (36.6 °C) 88 18 137/83 97 %      Temp Source Heart Rate Source Patient Position - Orthostatic VS BP Location FiO2 (%)   06/21/24 1413 06/21/24 1416  06/21/24 1416 06/21/24 1416 --   Oral Monitor Lying Left arm       Pain Score       06/21/24 1420       4           Vitals:    06/21/24 1416   BP: 137/83   Pulse: 88   Patient Position - Orthostatic VS: Lying         Visual Acuity      ED Medications  Medications   tetanus-diphtheria-acellular pertussis (BOOSTRIX) IM injection 0.5 mL (0.5 mL Intramuscular Given 6/21/24 1454)   lidocaine (PF) (XYLOCAINE-MPF) 1 % injection 10 mL (10 mL Infiltration Given by Other 6/21/24 1452)   ALPRAZolam (XANAX) tablet 1 mg (1 mg Oral Given 6/21/24 1530)   buprenorphine-naloxone (Suboxone) film 12 mg (12 mg Sublingual Given 6/21/24 1533)       Diagnostic Studies  Results Reviewed       None                   CT head without contrast   Final Result by Ben Manley DO (06/21 1500)   No acute intracranial abnormality.                  Workstation performed: [id number]                    Procedures  Universal Protocol:  Consent: Verbal consent obtained.  Consent given by: patient  Patient identity confirmed: arm band  Laceration repair    Date/Time: 6/21/2024 3:00 PM    Performed by: Cruz Delaney PA-C  Authorized by: Cruz Delaney PA-C  Location: right hand.  Laceration length: 3 cm  Tendon involvement: none  Nerve involvement: none  Vascular damage: no  Anesthesia: local infiltration    Anesthesia:  Local Anesthetic: lidocaine 1% without epinephrine  Anesthetic total: 6 mL    Wound Dehiscence:  Superficial Wound Dehiscence: simple closure      Procedure Details:  Irrigation solution: saline  Irrigation method: jet lavage and syringe  Debridement: none  Degree of undermining: none  Skin closure: 5-0 nylon  Number of sutures: 4  Technique: simple  Approximation: close  Approximation difficulty: simple  Patient tolerance: patient tolerated the procedure well with no immediate complications  Comments: The wound was irrigated copiously with sterile water prior to suturing closed.  The wound was cleaned  internally and externally with povidone iodine prior to suturing closed.               ED Course                                             Medical Decision Making  49-year-old male presenting to the emergency department today for a laceration to the dorsal aspect of the right hand.  Sustained this with a .  Subsequently had a fall with head strike but did not lose consciousness.  Vital signs are stable.  Afebrile.  On physical examination, the patient has a laceration to the dorsal aspect of the right hand between the right thumb and right index finger.  He has normal range of motion to the right thumb and right index finger with normal sensation to the distal aspect of the right thumb and right index finger.  No evidence of basilar skull fracture.  No neck tenderness to palpation.  He has a hematoma to the left forehead.  CT head negative for any acute intracranial abnormalities.  Tetanus shot was updated while here in the emergency department.  The patient's laceration was irrigated copiously with sterile water, cleaned internally and externally with povidone iodine, then sutured closed with 4 simple interrupted sutures.  The patient was then placed in a wrist brace to decrease risk of wound dehiscence.  Incidentally, the patient notes he will be unable to go back home today and is requesting a dose of his Suboxone and Xanax while here in the emergency department which I provided for him.  I did call the patient's pharmacy ([facility] in [place], [place]) to confirm dose of Suboxone.  The patient is stable for discharge at this time.  Wound care instructions were given.  Follow-up outpatient.  Return to the emergency department for signs of infection.  Strict return precautions were given.  Recommend PCP follow-up as soon as possible. The patient and/or patient's proxy verify their understanding and agree to the plan at this time.  All questions answered to the patient and/or their proxy's  "satisfaction.  All labs reviewed and utilized in the medical decision making process (if labs were ordered).  Portions of the record may have been created with voice recognition software.  Occasional wrong word or \"sound a like\" substitutions may have occurred due to the inherent limitations of voice recognition software.  Read the chart carefully and recognize, using context, where substitutions have occurred.    I reviewed prior notes.    Problems Addressed:  Encounter for medication administration: acute illness or injury  Laceration of right hand without foreign body, initial encounter: undiagnosed new problem with uncertain prognosis  Minor head injury, initial encounter: undiagnosed new problem with uncertain prognosis    Amount and/or Complexity of Data Reviewed  External Data Reviewed: notes.  Radiology: ordered. Decision-making details documented in ED Course.    Risk  Prescription drug management.             Disposition  Final diagnoses:   Minor head injury, initial encounter   Laceration of right hand without foreign body, initial encounter   Encounter for medication administration     Time reflects when diagnosis was documented in both MDM as applicable and the Disposition within this note       Time User Action Codes Description Comment    6/21/2024  3:11 PM Cruz Delaney Add [S09.90XA] Minor head injury, initial encounter     6/21/2024  3:11 PM Cruz Delaney Add [S61.411A] Laceration of right hand without foreign body, initial encounter     6/21/2024  3:11 PM Cruz Delaney Modify [S09.90XA] Minor head injury, initial encounter     6/21/2024  3:11 PM Cruz Delaney Modify [S61.411A] Laceration of right hand without foreign body, initial encounter     6/21/2024  7:16 PM Cruz Delaney Add [Z76.89] Encounter for medication administration           ED Disposition       ED Disposition   Discharge    Condition   Stable    Date/Time   Fri Jun 21, 2024  3:12 PM    " Comment   Edd Quezada discharge to home/self care.                   Follow-up Information    None         Discharge Medication List as of 6/21/2024  3:12 PM        CONTINUE these medications which have NOT CHANGED    Details   acetaminophen (TYLENOL) 325 mg tablet Take 650 mg by mouth every 6 (six) hours as needed for mild pain, Until Discontinued, Historical Med      ALPRAZolam (XANAX) 1 mg tablet Take 1 mg by mouth 3 (three) times a day, Historical Med      Buprenorphine HCl-Naloxone HCl 12-3 MG FILM Place 8 mg under the tongue once, Historical Med      calcium carbonate (TUMS) 500 mg chewable tablet Chew 2 tablets 2 (two) times a day, Until Discontinued, Historical Med      citalopram (CeleXA) 40 mg tablet Take 40 mg by mouth daily, Historical Med      meclizine (ANTIVERT) 25 mg tablet Take 1 tablet (25 mg total) by mouth 3 (three) times a day as needed for dizziness, Starting Wed 1/11/2023, Normal      naproxen (NAPROSYN) 500 mg tablet Take 1 tablet (500 mg total) by mouth 2 (two) times a day as needed for mild pain, Starting Mon 2/19/2024, Normal      ondansetron (ZOFRAN) 4 mg tablet Take 1 tablet (4 mg total) by mouth every 6 (six) hours, Starting Wed 1/11/2023, Normal      pantoprazole (PROTONIX) 40 mg tablet Take 1 tablet (40 mg total) by mouth daily, Starting Tue 10/6/2020, Normal      tobramycin-dexamethasone (TOBRADEX) ophthalmic suspension Administer 1 drop to the right eye every 4 (four) hours while awake, Starting Mon 1/9/2023, Normal             No discharge procedures on file.    PDMP Review         Value Time User    PDMP Reviewed  Yes 5/11/2024 11:57 PM Gennaro Vega MD            ED Provider  Electronically Signed by             Cruz Delaney PA-C  06/21/24 1924

## 2024-06-21 NOTE — DISCHARGE INSTRUCTIONS
Please keep the wound clean and dry.  You may wash the wound with soap and water but please do not scrub.  You may use Tylenol and Motrin for pain relief.  Please see the back of the bottle for dosing instructions.  Please do not submerge in dirty water.  I recommend against washing dishes, sitting in a Jacuzzi, etc..  Please return to the emergency department in 10-14 days for suture removal.  Some signs and symptoms of infection include fevers, chills, red streaking, pus drainage, severe pain to the finger, etc., please seek medical attention if you experience any the symptoms.

## 2024-07-06 ENCOUNTER — HOSPITAL ENCOUNTER (EMERGENCY)
Facility: HOSPITAL | Age: 49
Discharge: HOME | End: 2024-07-06
Attending: EMERGENCY MEDICINE | Admitting: EMERGENCY MEDICINE

## 2024-07-06 VITALS
SYSTOLIC BLOOD PRESSURE: 122 MMHG | WEIGHT: 170 LBS | HEART RATE: 75 BPM | DIASTOLIC BLOOD PRESSURE: 71 MMHG | BODY MASS INDEX: 23.03 KG/M2 | OXYGEN SATURATION: 98 % | RESPIRATION RATE: 18 BRPM | HEIGHT: 72 IN | TEMPERATURE: 98.1 F

## 2024-07-06 DIAGNOSIS — Z76.0 ENCOUNTER FOR MEDICATION REFILL: Primary | ICD-10-CM

## 2024-07-06 PROCEDURE — 99284 EMERGENCY DEPT VISIT MOD MDM: CPT | Performed by: EMERGENCY MEDICINE

## 2024-07-06 PROCEDURE — 99281 EMR DPT VST MAYX REQ PHY/QHP: CPT

## 2024-07-06 RX ORDER — BUPRENORPHINE AND NALOXONE 8; 2 MG/1; MG/1
8 FILM, SOLUBLE BUCCAL; SUBLINGUAL DAILY
Status: DISCONTINUED | OUTPATIENT
Start: 2024-07-06 | End: 2024-07-06 | Stop reason: HOSPADM

## 2024-07-06 RX ORDER — ALPRAZOLAM 0.25 MG/1
1 TABLET ORAL ONCE
Status: COMPLETED | OUTPATIENT
Start: 2024-07-06 | End: 2024-07-06

## 2024-07-06 RX ADMIN — ALPRAZOLAM 1 MG: 0.25 TABLET ORAL at 13:08

## 2024-07-06 RX ADMIN — BUPRENORPHINE AND NALOXONE 8 MG: 8; 2 FILM BUCCAL; SUBLINGUAL at 13:08

## 2024-07-06 NOTE — DISCHARGE INSTRUCTIONS
You were evaluated in the emergency department for: medication refill. You can access your current and pending results through [employer] [employer].    - You should follow-up with your primary care provider, as soon as possible, for re-evaluation.  - If you are unable to find your mediations, you should reach out to your primary doctor for new prescription.  - If you begin having seizures or severe withdrawal symptoms, you should return to the ED.    Please, return to the emergency department if you experience new or worsening symptoms, fever, chest pain, shortness of breath, difficulty breathing, dizziness, abdominal pain, persistent nausea/vomiting, syncope or passing out, blood in your urine or stool, coughing up blood, leg swelling/pain, urinary retention, bowel or bladder incontinence, numbness between your legs.

## 2024-07-06 NOTE — ED PROVIDER NOTES
History  Chief Complaint   Patient presents with    Medication Refill     Patient states that he may have misplaced his medication, in the middle of moving, and having financial problems/relying on others for rides and unable to get back to their new home today.      HPI    Edd Quezada is a 49 y.o. male with history of GERD, transaminitis, anxiety, previous history of opioid use disorder on Suboxone presenting for medication refill.    Patient reports he has been out of his prescribed medications specifically Xanax and Suboxone for the last day.  Patient reports that this is due to the fact that he is moving to [place], and believes he left his medication at the new house which he has inconsistent access to as the patient's car is not working well, planned trip with confirmed ride at 5 pm today.  Reports history of withdrawal seizures, however denies seizure activity, reports last 1 mg of Xanax taken yesterday morning, usually takes about 3/day.  Also reports last dose of Suboxone at noon yesterday. Patient reports slight anxiety, mild headache, jitteriness, sweating though he did walk to the ED in hot weather, yawning. Denies fevers, chills, dizziness, chest pain, palpitations, shortness of breath, abdominal pain, nausea, vomiting, constipation, diarrhea, urinary frequency, dysuria, and new extremity weakness, swelling and pain.    Prior to Admission Medications   Prescriptions Last Dose Informant Patient Reported? Taking?   ALPRAZolam (XANAX) 1 mg tablet   Yes No   Sig: Take 1 mg by mouth 3 (three) times a day   ALPRAZolam (XANAX) 1 mg tablet   No No   Sig: Take 1 tablet (1 mg total) by mouth 3 (three) times a day as needed for anxiety for up to 1 day   Buprenorphine HCl-Naloxone HCl 12-3 MG FILM   Yes No   Sig: Place 8 mg under the tongue once   acetaminophen (TYLENOL) 325 mg tablet   Yes No   Sig: Take 650 mg by mouth every 6 (six) hours as needed for mild pain   calcium carbonate (TUMS) 500 mg chewable  tablet   Yes No   Sig: Chew 2 tablets 2 (two) times a day   citalopram (CeleXA) 40 mg tablet   Yes No   Sig: Take 40 mg by mouth daily   meclizine (ANTIVERT) 25 mg tablet   No No   Sig: Take 1 tablet (25 mg total) by mouth 3 (three) times a day as needed for dizziness   Patient not taking: Reported on 5/13/2024   naproxen (NAPROSYN) 500 mg tablet   No No   Sig: Take 1 tablet (500 mg total) by mouth 2 (two) times a day as needed for mild pain   Patient not taking: Reported on 5/13/2024   ondansetron (ZOFRAN) 4 mg tablet   No No   Sig: Take 1 tablet (4 mg total) by mouth every 6 (six) hours   Patient not taking: Reported on 5/13/2024   pantoprazole (PROTONIX) 40 mg tablet   No No   Sig: Take 1 tablet (40 mg total) by mouth daily   Patient not taking: Reported on 11/12/2020   tobramycin-dexamethasone (TOBRADEX) ophthalmic suspension   No No   Sig: Administer 1 drop to the right eye every 4 (four) hours while awake   Patient not taking: Reported on 5/13/2024      Facility-Administered Medications: None       Past Medical History:   Diagnosis Date    Low back pain with sciatica     Vertigo        Past Surgical History:   Procedure Laterality Date    ESOPHAGOGASTRODUODENOSCOPY N/A     KNEE ARTHROSCOPY Right     CT BX/EXC LYMPH NODE OPEN SUPERFICIAL Left 1/24/2017    Procedure: INGUINAL LYMPH NODE BIOPSY WITH FLOW CYTOMETRY;  Surgeon: Steffen Munguia DO;  Location: [facility];  Service: General       History reviewed. No pertinent family history.  I have reviewed and agree with the history as documented.    E-Cigarette/Vaping     E-Cigarette/Vaping Substances    Nicotine Yes      Social History     Tobacco Use    Smoking status: Former     Current packs/day: 1.00     Average packs/day: 1 pack/day for 25.0 years (25.0 ttl pk-yrs)     Types: Cigarettes    Smokeless tobacco: Current    Tobacco comments:     Vape   Substance Use Topics    Alcohol use: Yes     Comment: rarely    Drug use: No        Review of Systems    Constitutional:  Negative for chills, fatigue and fever.   Eyes:  Negative for pain and visual disturbance.   Respiratory:  Negative for cough, chest tightness, shortness of breath, wheezing and stridor.    Cardiovascular:  Negative for chest pain, palpitations and leg swelling.   Gastrointestinal:  Negative for abdominal pain, constipation, diarrhea, nausea and vomiting.   Genitourinary:  Negative for dysuria and hematuria.   Musculoskeletal:  Negative for arthralgias and back pain.   Skin:  Negative for color change, pallor and rash.   Neurological:  Positive for headaches. Negative for dizziness, syncope, weakness, light-headedness and numbness.   Psychiatric/Behavioral:  Negative for behavioral problems.    All other systems reviewed and are negative.      Physical Exam  ED Triage Vitals [07/06/24 1209]   Temperature Pulse Respirations Blood Pressure SpO2   98.1 °F (36.7 °C) 75 18 122/71 98 %      Temp Source Heart Rate Source Patient Position - Orthostatic VS BP Location FiO2 (%)   Oral Monitor Sitting Right arm --      Pain Score       No Pain             Orthostatic Vital Signs  Vitals:    07/06/24 1209   BP: 122/71   Pulse: 75   Patient Position - Orthostatic VS: Sitting       Physical Exam  Vitals and nursing note reviewed.   Constitutional:       Appearance: Normal appearance. He is well-developed.   HENT:      Head: Normocephalic and atraumatic.      Nose: No rhinorrhea.      Mouth/Throat:      Mouth: Mucous membranes are moist.      Pharynx: Oropharynx is clear.      Comments: Tongue fasiculations  Eyes:      General: No visual field deficit.     Extraocular Movements: Extraocular movements intact.      Conjunctiva/sclera: Conjunctivae normal.      Pupils: Pupils are equal, round, and reactive to light.   Cardiovascular:      Rate and Rhythm: Normal rate and regular rhythm.      Pulses: Normal pulses.      Heart sounds: Normal heart sounds. No murmur heard.  Pulmonary:      Effort: Pulmonary effort is  normal. No respiratory distress.      Breath sounds: Normal breath sounds. No wheezing, rhonchi or rales.   Abdominal:      General: Abdomen is flat. Bowel sounds are normal.      Palpations: Abdomen is soft.      Tenderness: There is no abdominal tenderness.   Musculoskeletal:      Cervical back: Neck supple.      Right lower leg: No edema.      Left lower leg: No edema.   Skin:     General: Skin is warm and moist.      Capillary Refill: Capillary refill takes less than 2 seconds.   Neurological:      General: No focal deficit present.      Mental Status: He is alert and oriented to person, place, and time.      Cranial Nerves: No cranial nerve deficit or dysarthria.      Sensory: Sensation is intact.      Motor: Motor function is intact. No weakness, tremor, abnormal muscle tone or pronator drift.      Coordination: Coordination is intact. Romberg sign negative. Coordination normal. Finger-Nose-Finger Test normal. Rapid alternating movements normal.      Gait: Gait is intact. Gait normal.   Psychiatric:         Mood and Affect: Mood normal.         Behavior: Behavior normal.         ED Medications  Medications   ALPRAZolam (XANAX) tablet 1 mg (1 mg Oral Given 7/6/24 1308)       Diagnostic Studies  Results Reviewed       None                   No orders to display         Procedures  Procedures      ED Course  ED Course as of 07/06/24 1757   Sat Jul 06, 2024   1226 Patient seen and evaluated at beside   1245 Will give home Xanax and buprenorphine as confirmed on the PDMP                             SBIRT 22yo+      Flowsheet Row Most Recent Value   Initial Alcohol Screen: US AUDIT-C     1. How often do you have a drink containing alcohol? 0 Filed at: 07/06/2024 1206   2. How many drinks containing alcohol do you have on a typical day you are drinking?  0 Filed at: 07/06/2024 1206   3a. Male UNDER 65: How often do you have five or more drinks on one occasion? 0 Filed at: 07/06/2024 1206   3b. FEMALE Any Age, or MALE  65+: How often do you have 4 or more drinks on one occassion? 0 Filed at: 07/06/2024 1206   Audit-C Score 0 Filed at: 07/06/2024 1206   FINA: How many times in the past year have you...    Used an illegal drug or used a prescription medication for non-medical reasons? Never Filed at: 07/06/2024 1206                  Medical Decision Making  Edd Quezada is a 49 y.o. male presenting for medication refill. Associated symptoms: Headache, jitters, sweating, slight anxiety. Vitals unremarkable. Exam remarkable for sweating, mild tongue fasciculations, piloerection, otherwise unremarkable.      DDX including but not limited to medication refill, withdrawal, anxiety    Based on results available while the patient was in the ED:  Patient was given a dose of Xanax and Suboxone in the ED. As patient believes he has medications at home, at this time would not represcribe from the ED.  Discussed importance of attempting to find his medications and following up with his PCP for for represcription if he is unable to find these medications.  Also discussed strict return precautions, as well as importance of coming back to the ED if he starts to develop symptoms and is unable to get his medications.  Patient expressed understanding and agreement with the plan.    See ED course for further updates.    After evaluation and workup in the emergency department Patient appears well, is nontoxic appearing, expresses understanding and agrees with plan of care at this time.  In light of this patient would benefit from outpatient management. Discussed strict return precautions.  Discussed importance of following up with PCP in the next few days.  All questions answered.  Patient is agreeable to discharge.    Amount and/or Complexity of Data Reviewed  Independent Historian:      Details: Significant other  External Data Reviewed: labs and notes.    Risk  OTC drugs.  Prescription drug management.          Disposition  Final diagnoses:    Encounter for medication refill     Time reflects when diagnosis was documented in both MDM as applicable and the Disposition within this note       Time User Action Codes Description Comment    7/6/2024 12:35 PM Treva Tabor Add [Z76.0] Encounter for medication refill           ED Disposition       ED Disposition   Discharge    Condition   Stable    Date/Time   Sat Jul 6, 2024 1246    Comment   Edd Pilar discharge to home/self care.                   Follow-up Information       Follow up With Specialties Details Why Contact Info Additional Information    Remi Mckay,  Family Medicine Go to  Re-evaluation of symptoms [address]  [place] [id number] [phone]       [facility] Emergency Department Emergency Medicine Go to  As needed, If symptoms worsen [address] [facility] [address]  [phone] [facility] Emergency Department, [facility] [place], [place], [address]            Discharge Medication List as of 7/6/2024 12:48 PM        CONTINUE these medications which have NOT CHANGED    Details   acetaminophen (TYLENOL) 325 mg tablet Take 650 mg by mouth every 6 (six) hours as needed for mild pain, Until Discontinued, Historical Med      ALPRAZolam (XANAX) 1 mg tablet Take 1 mg by mouth 3 (three) times a day, Historical Med      Buprenorphine HCl-Naloxone HCl 12-3 MG FILM Place 8 mg under the tongue once, Historical Med      calcium carbonate (TUMS) 500 mg chewable tablet Chew 2 tablets 2 (two) times a day, Until Discontinued, Historical Med      citalopram (CeleXA) 40 mg tablet Take 40 mg by mouth daily, Historical Med      meclizine (ANTIVERT) 25 mg tablet Take 1 tablet (25 mg total) by mouth 3 (three) times a day as needed for dizziness, Starting Wed 1/11/2023, Normal      naproxen (NAPROSYN) 500 mg tablet Take 1 tablet (500 mg total) by mouth 2 (two) times a day as needed for mild pain, Starting Mon 2/19/2024, Normal       ondansetron (ZOFRAN) 4 mg tablet Take 1 tablet (4 mg total) by mouth every 6 (six) hours, Starting Wed 1/11/2023, Normal      pantoprazole (PROTONIX) 40 mg tablet Take 1 tablet (40 mg total) by mouth daily, Starting Tue 10/6/2020, Normal      tobramycin-dexamethasone (TOBRADEX) ophthalmic suspension Administer 1 drop to the right eye every 4 (four) hours while awake, Starting Mon 1/9/2023, Normal           No discharge procedures on file.    PDMP Review         Value Time User    PDMP Reviewed  Yes 5/11/2024 11:57 PM Gennaro Vega MD             ED Provider  Attending physically available and evaluated Edd Quezada. I managed the patient along with the ED Attending.    Electronically Signed by           Treva Tabor MD  07/06/24 4112

## 2024-07-06 NOTE — ED ATTENDING ATTESTATION
7/6/2024  I, Génesis Galindo MD, saw and evaluated the patient. I have discussed the patient with the resident/non-physician practitioner and agree with the resident's/non-physician practitioner's findings, Plan of Care, and MDM as documented in the resident's/non-physician practitioner's note, except where noted. All available labs and Radiology studies were reviewed.  I was present for key portions of any procedure(s) performed by the resident/non-physician practitioner and I was immediately available to provide assistance.       At this point I agree with the current assessment done in the Emergency Department.  I have conducted an independent evaluation of this patient a history and physical is as follows:    49-year-old male presenting requesting a dose of his buprenorphine and Xanax.  The patient has been maintained on buprenorphine since 2016, also takes 1 mg of Xanax 3 times daily.  He is currently moving and believes that he left his medication at his new home that is about 30 miles away.  He has not had access to his medications in the last 48 hours and will not have access to them again until he is able to get a ride to his new home again at approximately 5 PM this evening.  He reports yawning, mild headache, goosebumps, sweatiness though does state that he walked to the emergency department here in the hot sun.  He does have a history of withdrawal seizures in the past but denies any recent seizure activity or hallucinations.  Feels mildly anxious.    Physical Exam  Vitals and nursing note reviewed.   Constitutional:       General: He is not in acute distress.     Appearance: He is well-developed. He is not ill-appearing, toxic-appearing or diaphoretic.   HENT:      Head: Normocephalic and atraumatic.      Right Ear: External ear normal.      Left Ear: External ear normal.      Nose: Nose normal.      Mouth/Throat:      Comments: Tongue fasciculations noted  Eyes:      Extraocular Movements:  Extraocular movements intact.      Conjunctiva/sclera: Conjunctivae normal.      Pupils: Pupils are equal, round, and reactive to light.   Cardiovascular:      Rate and Rhythm: Normal rate and regular rhythm.      Pulses: Normal pulses.      Heart sounds: Normal heart sounds. No murmur heard.     No friction rub. No gallop.   Pulmonary:      Effort: Pulmonary effort is normal. No respiratory distress.      Breath sounds: Normal breath sounds. No wheezing or rales.   Abdominal:      General: Bowel sounds are normal. There is no distension.      Palpations: Abdomen is soft.      Tenderness: There is no abdominal tenderness. There is no guarding.   Musculoskeletal:         General: No deformity. Normal range of motion.      Cervical back: Normal range of motion and neck supple.   Skin:     General: Skin is warm and dry.      Comments: Piloerection noted   Neurological:      General: No focal deficit present.      Mental Status: He is alert and oriented to person, place, and time.      Motor: No abnormal muscle tone.   Psychiatric:      Comments: Mildly anxious. Denies AVH.            ED Course  ED Course as of 07/06/24 1249   Sat Jul 06, 2024   1240 Patient is yawning, has piloerection, mild tongue fasciculations on exam but vital signs are normal.  He denies hallucinations.  Reports a mild headache.  Has not had his Xanax or buprenorphine in the last 48 hours due to moving.  States that he believes that his medications are at his new home but is not able to get a ride there until this evening at around 5 PM.  Will give patient his home dose of buprenorphine as well as Xanax.  If his meds have indeed been lost discussed with the patient that he will need to contact the prescribing provider for refills.  He does have a history of withdrawal seizures in the past so he was counseled that if he is unable to find his medications tonight and if symptomatic during the day tomorrow he should return to the emergency department  for an additional dose to prevent withdrawal.  Patient expressed agreement and understanding.         Critical Care Time  Procedures

## 2024-07-07 ENCOUNTER — HOSPITAL ENCOUNTER (EMERGENCY)
Facility: HOSPITAL | Age: 49
Discharge: HOME | End: 2024-07-07
Attending: EMERGENCY MEDICINE

## 2024-07-07 VITALS
HEART RATE: 78 BPM | SYSTOLIC BLOOD PRESSURE: 134 MMHG | RESPIRATION RATE: 18 BRPM | DIASTOLIC BLOOD PRESSURE: 79 MMHG | TEMPERATURE: 98 F | OXYGEN SATURATION: 97 %

## 2024-07-07 DIAGNOSIS — Z76.89 ENCOUNTER FOR MEDICATION ADMINISTRATION: Primary | ICD-10-CM

## 2024-07-07 PROCEDURE — 99284 EMERGENCY DEPT VISIT MOD MDM: CPT | Performed by: EMERGENCY MEDICINE

## 2024-07-07 PROCEDURE — 99281 EMR DPT VST MAYX REQ PHY/QHP: CPT

## 2024-07-07 RX ORDER — ALPRAZOLAM 0.25 MG/1
1 TABLET ORAL ONCE
Status: COMPLETED | OUTPATIENT
Start: 2024-07-07 | End: 2024-07-07

## 2024-07-07 RX ADMIN — BUPRENORPHINE AND NALOXONE 12 MG: 8; 2 FILM BUCCAL; SUBLINGUAL at 21:00

## 2024-07-07 RX ADMIN — ALPRAZOLAM 1 MG: 0.25 TABLET ORAL at 21:00

## 2024-07-08 NOTE — ED PROVIDER NOTES
History  Chief Complaint   Patient presents with    Medication Refill     Patient presents stating he was here yesterday and was given medication which he brought home but he states he left it in his apartment while moving and can't find it. Medication was alprazolam and suboxone.      HPI    Patient states he lost his home medication and cannot get it refilled until tomorrow.  He reports taking Suboxone and Xanax daily.  He denies any acute medical symptoms.    Prior to Admission Medications   Prescriptions Last Dose Informant Patient Reported? Taking?   ALPRAZolam (XANAX) 1 mg tablet   Yes No   Sig: Take 1 mg by mouth 3 (three) times a day   ALPRAZolam (XANAX) 1 mg tablet   No No   Sig: Take 1 tablet (1 mg total) by mouth 3 (three) times a day as needed for anxiety for up to 1 day   Buprenorphine HCl-Naloxone HCl 12-3 MG FILM   Yes No   Sig: Place 8 mg under the tongue once   acetaminophen (TYLENOL) 325 mg tablet   Yes No   Sig: Take 650 mg by mouth every 6 (six) hours as needed for mild pain   calcium carbonate (TUMS) 500 mg chewable tablet   Yes No   Sig: Chew 2 tablets 2 (two) times a day   citalopram (CeleXA) 40 mg tablet   Yes No   Sig: Take 40 mg by mouth daily   meclizine (ANTIVERT) 25 mg tablet   No No   Sig: Take 1 tablet (25 mg total) by mouth 3 (three) times a day as needed for dizziness   Patient not taking: Reported on 5/13/2024   naproxen (NAPROSYN) 500 mg tablet   No No   Sig: Take 1 tablet (500 mg total) by mouth 2 (two) times a day as needed for mild pain   Patient not taking: Reported on 5/13/2024   ondansetron (ZOFRAN) 4 mg tablet   No No   Sig: Take 1 tablet (4 mg total) by mouth every 6 (six) hours   Patient not taking: Reported on 5/13/2024   pantoprazole (PROTONIX) 40 mg tablet   No No   Sig: Take 1 tablet (40 mg total) by mouth daily   Patient not taking: Reported on 11/12/2020   tobramycin-dexamethasone (TOBRADEX) ophthalmic suspension   No No   Sig: Administer 1 drop to the right eye  every 4 (four) hours while awake   Patient not taking: Reported on 5/13/2024      Facility-Administered Medications: None       Past Medical History:   Diagnosis Date    Low back pain with sciatica     Vertigo        Past Surgical History:   Procedure Laterality Date    ESOPHAGOGASTRODUODENOSCOPY N/A     KNEE ARTHROSCOPY Right     [place] BX/EXC LYMPH NODE OPEN SUPERFICIAL Left 1/24/2017    Procedure: INGUINAL LYMPH NODE BIOPSY WITH FLOW CYTOMETRY;  Surgeon: Steffen Munguia DO;  Location: [facility];  Service: General       History reviewed. No pertinent family history.  I have reviewed and agree with the history as documented.    E-Cigarette/Vaping     E-Cigarette/Vaping Substances    Nicotine Yes      Social History     Tobacco Use    Smoking status: Former     Current packs/day: 1.00     Average packs/day: 1 pack/day for 25.0 years (25.0 ttl pk-yrs)     Types: Cigarettes    Smokeless tobacco: Current    Tobacco comments:     Vape   Substance Use Topics    Alcohol use: Yes     Comment: rarely    Drug use: No       Review of Systems   All other systems reviewed and are negative.      Physical Exam  Physical Exam  Vitals and nursing note reviewed.   Constitutional:       General: He is not in acute distress.     Appearance: He is well-developed. He is not diaphoretic.   HENT:      Head: Normocephalic and atraumatic.      Right Ear: External ear normal.      Left Ear: External ear normal.   Eyes:      General:         Right eye: No discharge.         Left eye: No discharge.      Pupils: Pupils are equal, round, and reactive to light.   Neck:      Thyroid: No thyromegaly.      Trachea: No tracheal deviation.   Cardiovascular:      Rate and Rhythm: Normal rate and regular rhythm.      Heart sounds: No murmur heard.  Pulmonary:      Effort: Pulmonary effort is normal.      Breath sounds: Normal breath sounds.   Abdominal:      General: Bowel sounds are normal. There is no distension.      Palpations: Abdomen is soft.       Tenderness: There is no abdominal tenderness.   Musculoskeletal:         General: No deformity. Normal range of motion.      Cervical back: Normal range of motion and neck supple.   Skin:     General: Skin is warm.      Capillary Refill: Capillary refill takes less than 2 seconds.   Neurological:      Mental Status: He is alert and oriented to person, place, and time.      Cranial Nerves: No cranial nerve deficit.      Motor: No abnormal muscle tone.   Psychiatric:         Behavior: Behavior normal.         Vital Signs  ED Triage Vitals   Temperature Pulse Respirations Blood Pressure SpO2   07/07/24 1825 07/07/24 1826 07/07/24 1826 07/07/24 1826 07/07/24 1826   98 °F (36.7 °C) 78 18 134/79 97 %      Temp Source Heart Rate Source Patient Position - Orthostatic VS BP Location FiO2 (%)   07/07/24 1825 -- 07/07/24 1826 07/07/24 1826 --   Oral  Lying Right arm       Pain Score       --                  Vitals:    07/07/24 1826   BP: 134/79   Pulse: 78   Patient Position - Orthostatic VS: Lying         Visual Acuity      ED Medications  Medications   ALPRAZolam (XANAX) tablet 1 mg (1 mg Oral Given 7/7/24 2100)   buprenorphine-naloxone (Suboxone) film 12 mg (12 mg Sublingual Given 7/7/24 2100)       Diagnostic Studies  Results Reviewed       None                   No orders to display              Procedures  Procedures         ED Course                                             Medical Decision Making  I reviewed PDMP, confirmed doses of home medications.  Single dose of home medications given for this evening.  Patient understands that he cannot obtain a long-term prescription for chronic medications from the emergency department.  He is discharged home.    Risk  Prescription drug management.             Disposition  Final diagnoses:   Encounter for medication administration     Time reflects when diagnosis was documented in both MDM as applicable and the Disposition within this note       Time User Action Codes  Description Comment    7/7/2024  8:50 PM Hieu Galindo [Z76.89] Encounter for medication administration           ED Disposition       ED Disposition   Discharge    Condition   Stable    Date/Time   Sun Jul 7, 2024  8:50 PM    Comment   Edd Quezada discharge to home/self care.                   Follow-up Information       Follow up With Specialties Details Why Contact Info Additional Information    Remi Mckay DO Family Medicine Schedule an appointment as soon as possible for a visit in 1 day As needed [id number]  [place] [address]  [phone]       [facility] Emergency Medicine Go to  As needed [facility] [address]  [phone] [facility] Department, [address] [place] [address], [id number]            Discharge Medication List as of 7/7/2024  8:50 PM        CONTINUE these medications which have NOT CHANGED    Details   acetaminophen (TYLENOL) 325 mg tablet Take 650 mg by mouth every 6 (six) hours as needed for mild pain, Until Discontinued, Historical Med      ALPRAZolam (XANAX) 1 mg tablet Take 1 mg by mouth 3 (three) times a day, Historical Med      Buprenorphine HCl-Naloxone HCl 12-3 MG FILM Place 8 mg under the tongue once, Historical Med      calcium carbonate (TUMS) 500 mg chewable tablet Chew 2 tablets 2 (two) times a day, Until Discontinued, Historical Med      citalopram (CeleXA) 40 mg tablet Take 40 mg by mouth daily, Historical Med      meclizine (ANTIVERT) 25 mg tablet Take 1 tablet (25 mg total) by mouth 3 (three) times a day as needed for dizziness, Starting Wed 1/11/2023, Normal      naproxen (NAPROSYN) 500 mg tablet Take 1 tablet (500 mg total) by mouth 2 (two) times a day as needed for mild pain, Starting Mon 2/19/2024, Normal      ondansetron (ZOFRAN) 4 mg tablet Take 1 tablet (4 mg total) by mouth every 6 (six) hours, Starting Wed 1/11/2023, Normal      pantoprazole  (PROTONIX) 40 mg tablet Take 1 tablet (40 mg total) by mouth daily, Starting Tue 10/6/2020, Normal      tobramycin-dexamethasone (TOBRADEX) ophthalmic suspension Administer 1 drop to the right eye every 4 (four) hours while awake, Starting Mon 1/9/2023, Normal             No discharge procedures on file.    PDMP Review         Value Time User    PDMP Reviewed  Yes 5/11/2024 11:57 PM Gennaro Vega MD            ED Provider  Electronically Signed by             Hieu Galindo MD  07/07/24 4914

## 2024-07-21 ENCOUNTER — APPOINTMENT (EMERGENCY)
Dept: CT IMAGING | Facility: HOSPITAL | Age: 49
End: 2024-07-21

## 2024-07-21 ENCOUNTER — HOSPITAL ENCOUNTER (EMERGENCY)
Facility: HOSPITAL | Age: 49
Discharge: HOME | End: 2024-07-21
Attending: EMERGENCY MEDICINE

## 2024-07-21 VITALS
OXYGEN SATURATION: 98 % | SYSTOLIC BLOOD PRESSURE: 105 MMHG | TEMPERATURE: 98.5 F | HEART RATE: 76 BPM | RESPIRATION RATE: 18 BRPM | DIASTOLIC BLOOD PRESSURE: 55 MMHG

## 2024-07-21 VITALS
OXYGEN SATURATION: 97 % | HEART RATE: 51 BPM | SYSTOLIC BLOOD PRESSURE: 98 MMHG | DIASTOLIC BLOOD PRESSURE: 59 MMHG | TEMPERATURE: 97.8 F | RESPIRATION RATE: 16 BRPM

## 2024-07-21 DIAGNOSIS — Z87.898 HISTORY OF SEIZURE: ICD-10-CM

## 2024-07-21 DIAGNOSIS — G40.919 BREAKTHROUGH SEIZURE (HCC): Primary | ICD-10-CM

## 2024-07-21 DIAGNOSIS — G40.919 BREAKTHROUGH SEIZURE (HCC): ICD-10-CM

## 2024-07-21 DIAGNOSIS — S09.90XA CLOSED HEAD INJURY, INITIAL ENCOUNTER: Primary | ICD-10-CM

## 2024-07-21 LAB
ALBUMIN SERPL BCG-MCNC: 3.4 G/DL (ref 3.5–5)
ALP SERPL-CCNC: 83 U/L (ref 34–104)
ALT SERPL W P-5'-P-CCNC: 38 U/L (ref 7–52)
ANION GAP SERPL CALCULATED.3IONS-SCNC: 6 MMOL/L (ref 4–13)
AST SERPL W P-5'-P-CCNC: 32 U/L (ref 13–39)
ATRIAL RATE: 62 BPM
BASOPHILS # BLD AUTO: 0.03 THOUSANDS/ÂΜL (ref 0–0.1)
BASOPHILS NFR BLD AUTO: 0 % (ref 0–1)
BILIRUB SERPL-MCNC: 0.49 MG/DL (ref 0.2–1)
BUN SERPL-MCNC: 11 MG/DL (ref 5–25)
CALCIUM ALBUM COR SERPL-MCNC: 8.7 MG/DL (ref 8.3–10.1)
CALCIUM SERPL-MCNC: 8.2 MG/DL (ref 8.4–10.2)
CHLORIDE SERPL-SCNC: 109 MMOL/L (ref 96–108)
CO2 SERPL-SCNC: 26 MMOL/L (ref 21–32)
CREAT SERPL-MCNC: 0.8 MG/DL (ref 0.6–1.3)
EOSINOPHIL # BLD AUTO: 0.16 THOUSAND/ÂΜL (ref 0–0.61)
EOSINOPHIL NFR BLD AUTO: 2 % (ref 0–6)
ERYTHROCYTE [DISTWIDTH] IN BLOOD BY AUTOMATED COUNT: 13.3 % (ref 11.6–15.1)
GFR SERPL CREATININE-BSD FRML MDRD: 106 ML/MIN/1.73SQ M
GLUCOSE SERPL-MCNC: 140 MG/DL (ref 65–140)
HCT VFR BLD AUTO: 37.1 % (ref 36.5–49.3)
HGB BLD-MCNC: 12.4 G/DL (ref 12–17)
IMM GRANULOCYTES # BLD AUTO: 0.03 THOUSAND/UL (ref 0–0.2)
IMM GRANULOCYTES NFR BLD AUTO: 0 % (ref 0–2)
LYMPHOCYTES # BLD AUTO: 2.48 THOUSANDS/ÂΜL (ref 0.6–4.47)
LYMPHOCYTES NFR BLD AUTO: 33 % (ref 14–44)
MAGNESIUM SERPL-MCNC: 1.9 MG/DL (ref 1.9–2.7)
MCH RBC QN AUTO: 30.8 PG (ref 26.8–34.3)
MCHC RBC AUTO-ENTMCNC: 33.4 G/DL (ref 31.4–37.4)
MCV RBC AUTO: 92 FL (ref 82–98)
MONOCYTES # BLD AUTO: 0.55 THOUSAND/ÂΜL (ref 0.17–1.22)
MONOCYTES NFR BLD AUTO: 7 % (ref 4–12)
NEUTROPHILS # BLD AUTO: 4.23 THOUSANDS/ÂΜL (ref 1.85–7.62)
NEUTS SEG NFR BLD AUTO: 58 % (ref 43–75)
NRBC BLD AUTO-RTO: 0 /100 WBCS
P AXIS: 54 DEGREES
PLATELET # BLD AUTO: 143 THOUSANDS/UL (ref 149–390)
PMV BLD AUTO: 11.2 FL (ref 8.9–12.7)
POTASSIUM SERPL-SCNC: 3.9 MMOL/L (ref 3.5–5.3)
PR INTERVAL: 122 MS
PROT SERPL-MCNC: 6 G/DL (ref 6.4–8.4)
QRS AXIS: 51 DEGREES
QRSD INTERVAL: 102 MS
QT INTERVAL: 426 MS
QTC INTERVAL: 432 MS
RBC # BLD AUTO: 4.03 MILLION/UL (ref 3.88–5.62)
SODIUM SERPL-SCNC: 141 MMOL/L (ref 135–147)
T WAVE AXIS: 21 DEGREES
VENTRICULAR RATE: 62 BPM
WBC # BLD AUTO: 7.48 THOUSAND/UL (ref 4.31–10.16)

## 2024-07-21 PROCEDURE — 36415 COLL VENOUS BLD VENIPUNCTURE: CPT | Performed by: EMERGENCY MEDICINE

## 2024-07-21 PROCEDURE — 93005 ELECTROCARDIOGRAM TRACING: CPT

## 2024-07-21 PROCEDURE — 93010 ELECTROCARDIOGRAM REPORT: CPT | Performed by: INTERNAL MEDICINE

## 2024-07-21 PROCEDURE — 99284 EMERGENCY DEPT VISIT MOD MDM: CPT

## 2024-07-21 PROCEDURE — 85025 COMPLETE CBC W/AUTO DIFF WBC: CPT | Performed by: EMERGENCY MEDICINE

## 2024-07-21 PROCEDURE — 96374 THER/PROPH/DIAG INJ IV PUSH: CPT

## 2024-07-21 PROCEDURE — 99284 EMERGENCY DEPT VISIT MOD MDM: CPT | Performed by: EMERGENCY MEDICINE

## 2024-07-21 PROCEDURE — 83735 ASSAY OF MAGNESIUM: CPT | Performed by: EMERGENCY MEDICINE

## 2024-07-21 PROCEDURE — 80053 COMPREHEN METABOLIC PANEL: CPT | Performed by: EMERGENCY MEDICINE

## 2024-07-21 PROCEDURE — 99285 EMERGENCY DEPT VISIT HI MDM: CPT | Performed by: EMERGENCY MEDICINE

## 2024-07-21 PROCEDURE — 70450 CT HEAD/BRAIN W/O DYE: CPT

## 2024-07-21 RX ORDER — LEVETIRACETAM 500 MG/1
TABLET ORAL
Qty: 49 TABLET | Refills: 0 | Status: SHIPPED | OUTPATIENT
Start: 2024-07-21 | End: 2024-08-18

## 2024-07-21 RX ORDER — LEVETIRACETAM 500 MG/1
500 TABLET ORAL ONCE
Status: COMPLETED | OUTPATIENT
Start: 2024-07-21 | End: 2024-07-21

## 2024-07-21 RX ORDER — LORAZEPAM 2 MG/ML
1 INJECTION INTRAMUSCULAR ONCE
Status: COMPLETED | OUTPATIENT
Start: 2024-07-21 | End: 2024-07-21

## 2024-07-21 RX ORDER — LEVETIRACETAM 500 MG/1
TABLET ORAL
Qty: 49 TABLET | Refills: 0 | Status: SHIPPED | OUTPATIENT
Start: 2024-07-21 | End: 2024-07-21

## 2024-07-21 RX ADMIN — LEVETIRACETAM 500 MG: 500 TABLET, FILM COATED ORAL at 04:40

## 2024-07-21 RX ADMIN — LORAZEPAM 1 MG: 2 INJECTION INTRAMUSCULAR; INTRAVENOUS at 03:21

## 2024-07-21 NOTE — DISCHARGE INSTRUCTIONS
Follow up with neurology. Take the prescribed medications as directed. Please return to the emergency department if you develop worsening symptoms or anything else concerning to you.

## 2024-07-21 NOTE — ED PROVIDER NOTES
History  Chief Complaint   Patient presents with    Seizure - Prior Hx Of     Patient reports a recent increase in seizures following a head injury one month ago. Reports 1-3 seizures yesterday and 4 today, with the longest one lasting 30 seconds and was followed by 5 minutes of confusion/AMS. Also reports increased stress levels.      Patient reports increased amount of seizures since yesterday.  He had 4 witnessed seizures today and about 2 yesterday.  They report he has 2 different kinds of seizures, one where he does not move at all and eyes rolled up into his head.  This last for usually around 30 seconds, and then patient is confused for 1 minute before he returns to himself.  He has another type of seizure where he has total body shaking also lasting around 30 seconds and also with 1 to 5 minute period of confusion before he returns to himself.  Patient does take 1 mg of Xanax 3 times a day.  He notes that he did miss 1 dose of Xanax this evening, but otherwise has been taking his normal dose including yesterday when he had 2 seizures.  He notes that his typical seizure pattern is about 1 every 2 months.  He has never been prescribed antiepileptic drug.  He has never followed up with neurology.  He does see his primary care physician regularly.  No incontinence.  No tongue biting.  No palpitations or chest pain.      Seizure - Prior Hx Of      Prior to Admission Medications   Prescriptions Last Dose Informant Patient Reported? Taking?   ALPRAZolam (XANAX) 1 mg tablet   Yes No   Sig: Take 1 mg by mouth 3 (three) times a day   ALPRAZolam (XANAX) 1 mg tablet   No No   Sig: Take 1 tablet (1 mg total) by mouth 3 (three) times a day as needed for anxiety for up to 1 day   Buprenorphine HCl-Naloxone HCl 12-3 MG FILM   Yes No   Sig: Place 8 mg under the tongue once   acetaminophen (TYLENOL) 325 mg tablet   Yes No   Sig: Take 650 mg by mouth every 6 (six) hours as needed for mild pain   calcium carbonate (TUMS) 500 mg  chewable tablet   Yes No   Sig: Chew 2 tablets 2 (two) times a day   citalopram (CeleXA) 40 mg tablet   Yes No   Sig: Take 40 mg by mouth daily   meclizine (ANTIVERT) 25 mg tablet   No No   Sig: Take 1 tablet (25 mg total) by mouth 3 (three) times a day as needed for dizziness   Patient not taking: Reported on 5/13/2024   naproxen (NAPROSYN) 500 mg tablet   No No   Sig: Take 1 tablet (500 mg total) by mouth 2 (two) times a day as needed for mild pain   Patient not taking: Reported on 5/13/2024   ondansetron (ZOFRAN) 4 mg tablet   No No   Sig: Take 1 tablet (4 mg total) by mouth every 6 (six) hours   Patient not taking: Reported on 5/13/2024   pantoprazole (PROTONIX) 40 mg tablet   No No   Sig: Take 1 tablet (40 mg total) by mouth daily   Patient not taking: Reported on 11/12/2020   tobramycin-dexamethasone (TOBRADEX) ophthalmic suspension   No No   Sig: Administer 1 drop to the right eye every 4 (four) hours while awake   Patient not taking: Reported on 5/13/2024      Facility-Administered Medications: None       Past Medical History:   Diagnosis Date    Low back pain with sciatica     Seizure (HCC)     Vertigo        Past Surgical History:   Procedure Laterality Date    ESOPHAGOGASTRODUODENOSCOPY N/A     KNEE ARTHROSCOPY Right     [place] BX/EXC LYMPH NODE OPEN SUPERFICIAL Left 1/24/2017    Procedure: INGUINAL LYMPH NODE BIOPSY WITH FLOW CYTOMETRY;  Surgeon: Steffen Munguia DO;  Location: [facility];  Service: General       History reviewed. No pertinent family history.  I have reviewed and agree with the history as documented.    E-Cigarette/Vaping    E-Cigarette Use Former User      E-Cigarette/Vaping Substances    Nicotine Yes      Social History     Tobacco Use    Smoking status: Some Days     Current packs/day: 1.00     Average packs/day: 1 pack/day for 25.0 years (25.0 ttl pk-yrs)     Types: Cigarettes    Smokeless tobacco: Current    Tobacco comments:     Vape   Vaping Use    Vaping status: Former    Substances:  Nicotine   Substance Use Topics    Alcohol use: Not Currently     Comment: rarely    Drug use: No       Review of Systems   All other systems reviewed and are negative.      Physical Exam  Physical Exam  Vitals and nursing note reviewed.   Constitutional:       General: He is not in acute distress.     Appearance: He is well-developed.   HENT:      Head: Normocephalic and atraumatic.   Eyes:      Conjunctiva/sclera: Conjunctivae normal.   Cardiovascular:      Rate and Rhythm: Normal rate and regular rhythm.      Heart sounds: No murmur heard.  Pulmonary:      Effort: Pulmonary effort is normal. No respiratory distress.      Breath sounds: Normal breath sounds.   Abdominal:      Palpations: Abdomen is soft.      Tenderness: There is no abdominal tenderness.   Musculoskeletal:         General: No swelling.      Cervical back: Neck supple.   Skin:     General: Skin is warm and dry.      Capillary Refill: Capillary refill takes less than 2 seconds.   Neurological:      General: No focal deficit present.      Mental Status: He is alert.      Cranial Nerves: No cranial nerve deficit.      Motor: No weakness.   Psychiatric:         Mood and Affect: Mood normal.         Vital Signs  ED Triage Vitals [07/21/24 0311]   Temperature Pulse Respirations Blood Pressure SpO2   97.8 °F (36.6 °C) 69 18 108/53 96 %      Temp Source Heart Rate Source Patient Position - Orthostatic VS BP Location FiO2 (%)   Oral Monitor Lying Left arm --      Pain Score       --           Vitals:    07/21/24 0311 07/21/24 0415 07/21/24 0530 07/21/24 0630   BP: 108/53 97/52 101/57 98/59   Pulse: 69 57 56 (!) 51   Patient Position - Orthostatic VS: Lying Lying  Lying         Visual Acuity      ED Medications  Medications   LORazepam (ATIVAN) injection 1 mg (1 mg Intravenous Given 7/21/24 0321)   levETIRAcetam (KEPPRA) tablet 500 mg (500 mg Oral Given 7/21/24 0440)       Diagnostic Studies  Results Reviewed       Procedure Component Value Units Date/Time     Comprehensive metabolic panel [[id number]]  (Abnormal) Collected: 07/21/24 0324    Lab Status: Final result Specimen: Blood from Arm, Left Updated: 07/21/24 0351     Sodium 141 mmol/L      Potassium 3.9 mmol/L      Chloride 109 mmol/L      CO2 26 mmol/L      ANION GAP 6 mmol/L      BUN 11 mg/dL      Creatinine 0.80 mg/dL      Glucose 140 mg/dL      Calcium 8.2 mg/dL      Corrected Calcium 8.7 mg/dL      AST 32 U/L      ALT 38 U/L      Alkaline Phosphatase 83 U/L      Total Protein 6.0 g/dL      Albumin 3.4 g/dL      Total Bilirubin 0.49 mg/dL      eGFR 106 ml/min/1.73sq m     Narrative:      National Kid[employer] Disease Foundation guidelines for Chronic Kidney Disease (CKD):     Stage 1 with normal or high GFR (GFR > 90 mL/min/1.73 square meters)    Stage 2 Mild CKD (GFR = 60-89 mL/min/1.73 square meters)    Stage 3A Moderate CKD (GFR = 45-59 mL/min/1.73 square meters)    Stage 3B Moderate CKD (GFR = 30-44 mL/min/1.73 square meters)    Stage 4 Severe CKD (GFR = 15-29 mL/min/1.73 square meters)    Stage 5 End Stage CKD (GFR <15 mL/min/1.73 square meters)  Note: GFR calculation is accurate only with a steady state creatinine    Magnesium [[id number]]  (Normal) Collected: 07/21/24 0324    Lab Status: Final result Specimen: Blood from Arm, Left Updated: 07/21/24 0351     Magnesium 1.9 mg/dL     CBC and differential [[id number]]  (Abnormal) Collected: 07/21/24 0324    Lab Status: Final result Specimen: Blood from Arm, Left Updated: 07/21/24 0337     WBC 7.48 Thousand/uL      RBC 4.03 Million/uL      Hemoglobin 12.4 g/dL      Hematocrit 37.1 %      MCV 92 fL      MCH 30.8 pg      MCHC 33.4 g/dL      RDW 13.3 %      MPV 11.2 fL      Platelets 143 Thousands/uL      nRBC 0 /100 WBCs      Segmented % 58 %      Immature Grans % 0 %      Lymphocytes % 33 %      Monocytes % 7 %      Eosinophils Relative 2 %      Basophils Relative 0 %      Absolute Neutrophils 4.23 Thousands/µL      Absolute Immature Grans 0.03 Thousand/uL       Absolute Lymphocytes 2.48 Thousands/µL      Absolute Monocytes 0.55 Thousand/µL      Eosinophils Absolute 0.16 Thousand/µL      Basophils Absolute 0.03 Thousands/µL                    No orders to display              Procedures  Procedures         ED Course  ED Course as of 07/21/24 0636   Sun Jul 21, 2024 0315 EKG: SR at 62 with normal QRS, normal ST-t, QTc 432   0502 Case discussed with neurology, Dr. Gabriel, who recommended starting patient on anti-epileptic drug and outpatient follow-up.  I advised patient/family of this and they agreed with plan.   0635 No recurrent seizure.  Patient agrees to plan for medications and no driving until cleared by physician.                                 SBIRT 22yo+      Flowsheet Row Most Recent Value   Initial Alcohol Screen: US AUDIT-C     1. How often do you have a drink containing alcohol? 0 Filed at: 07/21/2024 0314   2. How many drinks containing alcohol do you have on a typical day you are drinking?  0 Filed at: 07/21/2024 0314   3b. FEMALE Any Age, or MALE 65+: How often do you have 4 or more drinks on one occassion? 0 Filed at: 07/21/2024 0314   Audit-C Score 0 Filed at: 07/21/2024 0314   FNIA: How many times in the past year have you...    Used an illegal drug or used a prescription medication for non-medical reasons? Never Filed at: 07/21/2024 0314                      Medical Decision Making  I eval the patient who has normal neurologic skin exam.  He has had witnessed tonic-clonic activity with postictal period consistent with 1 to 2-year long history of seizures.  He recently had a CAT scan last month so I do not believe there is a benefit to repeating now.  I reviewed records.  Previous records show patient was believed to be having withdrawal seizures, however, at this time patient tells me he had seizures despite taking his Xanax at his normal dose.  As he is appear unprovoked, will discuss with neurology.    Amount and/or Complexity of Data  Reviewed  External Data Reviewed: notes.     Details: I reviewed multiple ED visits including from January 2023 where patient had seizure-like episode thought to be convulsive syncope, as well as records from 2024 were patient had seizures thought to be secondary to benzo withdrawal.  Labs: ordered.    Risk  Prescription drug management.  Decision regarding hospitalization.  Diagnosis or treatment significantly limited by social determinants of health.                 Disposition  Final diagnoses:   Breakthrough seizure (HCC)     Time reflects when diagnosis was documented in both MDM as applicable and the Disposition within this note       Time User Action Codes Description Comment    7/21/2024  5:08 AM Howard Verduzco [G40.919] Breakthrough seizure (HCC)           ED Disposition       ED Disposition   Discharge    Condition   Stable    Date/Time   Sun Jul 21, 2024 0634    Comment   Edd Quezada discharge to home/self care.                   Follow-up Information       Follow up With Specialties Details Why Contact Info    Remi Mckay  Family Medicine   [address] [place], [place] [id number] [phone]              Patient's Medications   Discharge Prescriptions    LEVETIRACETAM (KEPPRA) 500 MG TABLET    Take 1 tablet (500 mg total) by mouth daily for 7 days, THEN 1 tablet (500 mg total) 2 (two) times a day for 21 days.       Start Date: 7/21/2024 End Date: 8/18/2024       Order Dose: --       Quantity: 49 tablet    Refills: 0       No discharge procedures on file.    PDMP Review         Value Time User    PDMP Reviewed  Yes 7/21/2024  3:57 AM Howard Verduzco MD            ED Provider  Electronically Signed by             Howard Verduzco MD  07/21/24 0644

## 2024-07-21 NOTE — ED PROVIDER NOTES
"History  Chief Complaint   Patient presents with    Medical Problem     Patient arrives to the ED with complain of \" feeling off \" patient was discharged this morning for seizure activity. Patient denies having any new seizures. \" I just want to get checked out, also I need the keppra to be sent to a different pharmacy that I can walk to\"     49-year-old male with history of seizures presenting for evaluation of headaches and feeling unwell.  Patient reports that he had 2 head injuries over the past month, most recently about 2 weeks ago.  He denies any loss of consciousness during the events.  He feels as though since these injuries, however, his seizures have become more frequent.  He was evaluated in the emergency department last night for seizure-like activity.  Labs were overall unremarkable at that time.  He was subsequently discharged.  He reports Jeronimo presenting to the emergency department out of continued concern related to the head injuries.  He is worried that these may have triggered his worsening seizures.  He has had headaches intermittently since the event.  No nausea, vomiting, fever, weakness, numbness.  He was placed on Keppra last night which she has been unable to  yet.  He does not currently see a neurologist.        Prior to Admission Medications   Prescriptions Last Dose Informant Patient Reported? Taking?   ALPRAZolam (XANAX) 1 mg tablet   Yes No   Sig: Take 1 mg by mouth 3 (three) times a day   ALPRAZolam (XANAX) 1 mg tablet   No No   Sig: Take 1 tablet (1 mg total) by mouth 3 (three) times a day as needed for anxiety for up to 1 day   Buprenorphine HCl-Naloxone HCl 12-3 MG FILM   Yes No   Sig: Place 8 mg under the tongue once   acetaminophen (TYLENOL) 325 mg tablet   Yes No   Sig: Take 650 mg by mouth every 6 (six) hours as needed for mild pain   calcium carbonate (TUMS) 500 mg chewable tablet   Yes No   Sig: Chew 2 tablets 2 (two) times a day   citalopram (CeleXA) 40 mg tablet   Yes " No   Sig: Take 40 mg by mouth daily   levETIRAcetam (Keppra) 500 mg tablet   No No   Sig: Take 1 tablet (500 mg total) by mouth daily for 7 days, THEN 1 tablet (500 mg total) 2 (two) times a day for 21 days.   levETIRAcetam (Keppra) 500 mg tablet   No Yes   Sig: Take 1 tablet (500 mg total) by mouth daily for 7 days, THEN 1 tablet (500 mg total) 2 (two) times a day for 21 days.   meclizine (ANTIVERT) 25 mg tablet   No No   Sig: Take 1 tablet (25 mg total) by mouth 3 (three) times a day as needed for dizziness   Patient not taking: Reported on 5/13/2024   naproxen (NAPROSYN) 500 mg tablet   No No   Sig: Take 1 tablet (500 mg total) by mouth 2 (two) times a day as needed for mild pain   Patient not taking: Reported on 5/13/2024   ondansetron (ZOFRAN) 4 mg tablet   No No   Sig: Take 1 tablet (4 mg total) by mouth every 6 (six) hours   Patient not taking: Reported on 5/13/2024   pantoprazole (PROTONIX) 40 mg tablet   No No   Sig: Take 1 tablet (40 mg total) by mouth daily   Patient not taking: Reported on 11/12/2020   tobramycin-dexamethasone (TOBRADEX) ophthalmic suspension   No No   Sig: Administer 1 drop to the right eye every 4 (four) hours while awake   Patient not taking: Reported on 5/13/2024      Facility-Administered Medications: None       Past Medical History:   Diagnosis Date    Low back pain with sciatica     Seizure (HCC)     Vertigo        Past Surgical History:   Procedure Laterality Date    ESOPHAGOGASTRODUODENOSCOPY N/A     KNEE ARTHROSCOPY Right     FL BX/EXC LYMPH NODE OPEN SUPERFICIAL Left 1/24/2017    Procedure: INGUINAL LYMPH NODE BIOPSY WITH FLOW CYTOMETRY;  Surgeon: Steffen Munguia DO;  Location: [facility];  Service: General       No family history on file.  I have reviewed and agree with the history as documented.    E-Cigarette/Vaping    E-Cigarette Use Former User      E-Cigarette/Vaping Substances    Nicotine Yes      Social History     Tobacco Use    Smoking status: Some Days     Current  packs/day: 1.00     Average packs/day: 1 pack/day for 25.0 years (25.0 ttl pk-yrs)     Types: Cigarettes    Smokeless tobacco: Current    Tobacco comments:     Vape   Vaping Use    Vaping status: Former    Substances: Nicotine   Substance Use Topics    Alcohol use: Not Currently     Comment: rarely    Drug use: No       Review of Systems   Constitutional:  Negative for chills and fever.   Eyes:  Negative for visual disturbance.   Respiratory:  Negative for shortness of breath.    Cardiovascular:  Negative for chest pain.   Gastrointestinal:  Negative for abdominal pain, nausea and vomiting.   Genitourinary:  Negative for flank pain.   Musculoskeletal:  Negative for gait problem.   Skin:  Negative for rash.   Neurological:  Positive for seizures and headaches. Negative for weakness and numbness.   All other systems reviewed and are negative.      Physical Exam  Physical Exam  Vitals and nursing note reviewed.   Constitutional:       General: He is not in acute distress.     Appearance: He is not ill-appearing.   HENT:      Head: Normocephalic and atraumatic.      Nose: Nose normal.      Mouth/Throat:      Mouth: Oropharynx is clear and moist. Mucous membranes are moist.   Eyes:      Extraocular Movements: Extraocular movements intact.      Conjunctiva/sclera: Conjunctivae normal.      Pupils: Pupils are equal, round, and reactive to light.   Cardiovascular:      Rate and Rhythm: Normal rate and regular rhythm.      Heart sounds: No murmur heard.     No friction rub. No gallop.   Pulmonary:      Effort: Pulmonary effort is normal.      Breath sounds: Normal breath sounds. No wheezing, rhonchi or rales.   Abdominal:      General: There is no distension.      Palpations: Abdomen is soft.      Tenderness: There is no abdominal tenderness.   Musculoskeletal:         General: No swelling, tenderness or edema. Normal range of motion.      Cervical back: Normal range of motion and neck supple.   Skin:     General: Skin is  warm and dry.      Coloration: Skin is not pale.      Findings: No rash.   Neurological:      General: No focal deficit present.      Mental Status: He is alert and oriented to person, place, and time.      Cranial Nerves: No cranial nerve deficit.      Sensory: No sensory deficit.      Motor: No weakness.   Psychiatric:         Mood and Affect: Mood and affect normal.         Behavior: Behavior normal.         Vital Signs  ED Triage Vitals [07/21/24 1730]   Temperature Pulse Respirations Blood Pressure SpO2   98.5 °F (36.9 °C) 91 18 117/71 97 %      Temp Source Heart Rate Source Patient Position - Orthostatic VS BP Location FiO2 (%)   Oral Monitor Sitting Left arm --      Pain Score       --           Vitals:    07/21/24 1730 07/21/24 1815   BP: 117/71 105/55   Pulse: 91 76   Patient Position - Orthostatic VS: Sitting Lying         Visual Acuity      ED Medications  Medications - No data to display    Diagnostic Studies  Results Reviewed       None                   CT head without contrast   Final Result by Sage Herrera MD (07/21 1856)      No acute intracranial abnormality.                  Workstation performed: [id number]                    Procedures  Procedures         ED Course                                               Medical Decision Making  49-year-old male presenting for reevaluation of seizure-like activity overnight last night, no seizures since discharge this morning.  Patient and his wife are primarily concerned regarding recent head trauma as cause of patient's increase in seizures.  Normal neurologic exam.  No change in symptoms compared to this morning.  CT obtained to rule out intracranial hemorrhage or other traumatic injury which was unremarkable.  Patient and his wife are comfortable with discharge at this time.  Referred to neurology for follow-up.  Patient also requested that his Keppra be sent to a different pharmacy which was completed.  Return precautions discussed.    Problems  Addressed:  Closed head injury, initial encounter: acute illness or injury  History of seizure: acute illness or injury    Amount and/or Complexity of Data Reviewed  Radiology: ordered.    Risk  Prescription drug management.                 Disposition  Final diagnoses:   Closed head injury, initial encounter   History of seizure     Time reflects when diagnosis was documented in both MDM as applicable and the Disposition within this note       Time User Action Codes Description Comment    7/21/2024  6:06 PM Divina Ortiz [G40.919] Breakthrough seizure (HCC)     7/21/2024  7:02 PM Divina Ortiz [S09.90XA] Closed head injury, initial encounter     7/21/2024  7:03 PM Divina Ortiz [Z87.898] History of seizure           ED Disposition       ED Disposition   Discharge    Condition   Stable    Date/Time   Sun Jul 21, 2024  7:02 PM    Comment   Edd Quezada discharge to home/self care.                   Follow-up Information       Follow up With Specialties Details Why Contact Info    Remi Mckay DO Family Medicine Schedule an appointment as soon as possible for a visit   [address] [facility] [id number] [phone]              Patient's Medications   Discharge Prescriptions    No medications on file           PDMP Review         Value Time User    PDMP Reviewed  Yes 7/21/2024  3:57 AM Howard Verduzco MD            ED Provider  Electronically Signed by             Divina Ortiz MD  07/21/24 2472

## 2024-07-21 NOTE — ED NOTES
Patient discharged with information on free food pantries in the area, and with transportation's via Lyft to their friend's apartment. Waives singed and filed.      Tyson Heredia RN  07/21/24 1922

## 2024-07-28 ENCOUNTER — HOSPITAL ENCOUNTER (OUTPATIENT)
Facility: HOSPITAL | Age: 49
Setting detail: OBSERVATION
Discharge: LEFT BEFORE DISCHARGE | End: 2024-07-28
Attending: EMERGENCY MEDICINE | Admitting: HOSPITALIST

## 2024-07-28 ENCOUNTER — APPOINTMENT (EMERGENCY)
Dept: RADIOLOGY | Facility: HOSPITAL | Age: 49
End: 2024-07-28

## 2024-07-28 VITALS
TEMPERATURE: 97.7 F | DIASTOLIC BLOOD PRESSURE: 72 MMHG | HEART RATE: 68 BPM | SYSTOLIC BLOOD PRESSURE: 117 MMHG | OXYGEN SATURATION: 98 % | RESPIRATION RATE: 18 BRPM

## 2024-07-28 DIAGNOSIS — R56.9 SEIZURE-LIKE ACTIVITY (HCC): Primary | ICD-10-CM

## 2024-07-28 LAB
ALBUMIN SERPL BCG-MCNC: 3.9 G/DL (ref 3.5–5)
ALP SERPL-CCNC: 93 U/L (ref 34–104)
ALT SERPL W P-5'-P-CCNC: 29 U/L (ref 7–52)
ANION GAP SERPL CALCULATED.3IONS-SCNC: 5 MMOL/L (ref 4–13)
AST SERPL W P-5'-P-CCNC: 24 U/L (ref 13–39)
ATRIAL RATE: 68 BPM
BASOPHILS # BLD AUTO: 0.03 THOUSANDS/ÂΜL (ref 0–0.1)
BASOPHILS NFR BLD AUTO: 0 % (ref 0–1)
BILIRUB SERPL-MCNC: 0.51 MG/DL (ref 0.2–1)
BUN SERPL-MCNC: 13 MG/DL (ref 5–25)
CALCIUM SERPL-MCNC: 8.9 MG/DL (ref 8.4–10.2)
CHLORIDE SERPL-SCNC: 107 MMOL/L (ref 96–108)
CO2 SERPL-SCNC: 25 MMOL/L (ref 21–32)
CREAT SERPL-MCNC: 0.68 MG/DL (ref 0.6–1.3)
EOSINOPHIL # BLD AUTO: 0.04 THOUSAND/ÂΜL (ref 0–0.61)
EOSINOPHIL NFR BLD AUTO: 1 % (ref 0–6)
ERYTHROCYTE [DISTWIDTH] IN BLOOD BY AUTOMATED COUNT: 13.2 % (ref 11.6–15.1)
GFR SERPL CREATININE-BSD FRML MDRD: 113 ML/MIN/1.73SQ M
GLUCOSE SERPL-MCNC: 133 MG/DL (ref 65–140)
HCT VFR BLD AUTO: 39.5 % (ref 36.5–49.3)
HGB BLD-MCNC: 13.4 G/DL (ref 12–17)
IMM GRANULOCYTES # BLD AUTO: 0.04 THOUSAND/UL (ref 0–0.2)
IMM GRANULOCYTES NFR BLD AUTO: 1 % (ref 0–2)
LEVETIRACETAM SERPL-MCNC: <2 UG/ML (ref 12–46)
LYMPHOCYTES # BLD AUTO: 1.73 THOUSANDS/ÂΜL (ref 0.6–4.47)
LYMPHOCYTES NFR BLD AUTO: 23 % (ref 14–44)
MCH RBC QN AUTO: 30.7 PG (ref 26.8–34.3)
MCHC RBC AUTO-ENTMCNC: 33.9 G/DL (ref 31.4–37.4)
MCV RBC AUTO: 91 FL (ref 82–98)
MONOCYTES # BLD AUTO: 0.49 THOUSAND/ÂΜL (ref 0.17–1.22)
MONOCYTES NFR BLD AUTO: 6 % (ref 4–12)
NEUTROPHILS # BLD AUTO: 5.28 THOUSANDS/ÂΜL (ref 1.85–7.62)
NEUTS SEG NFR BLD AUTO: 69 % (ref 43–75)
NRBC BLD AUTO-RTO: 0 /100 WBCS
P AXIS: 62 DEGREES
PLATELET # BLD AUTO: 195 THOUSANDS/UL (ref 149–390)
PMV BLD AUTO: 10.5 FL (ref 8.9–12.7)
POTASSIUM SERPL-SCNC: 3.5 MMOL/L (ref 3.5–5.3)
PR INTERVAL: 128 MS
PROT SERPL-MCNC: 6.7 G/DL (ref 6.4–8.4)
QRS AXIS: 67 DEGREES
QRSD INTERVAL: 94 MS
QT INTERVAL: 412 MS
QTC INTERVAL: 438 MS
RBC # BLD AUTO: 4.36 MILLION/UL (ref 3.88–5.62)
SODIUM SERPL-SCNC: 137 MMOL/L (ref 135–147)
T WAVE AXIS: 53 DEGREES
VENTRICULAR RATE: 68 BPM
WBC # BLD AUTO: 7.61 THOUSAND/UL (ref 4.31–10.16)

## 2024-07-28 PROCEDURE — 85025 COMPLETE CBC W/AUTO DIFF WBC: CPT

## 2024-07-28 PROCEDURE — 96374 THER/PROPH/DIAG INJ IV PUSH: CPT

## 2024-07-28 PROCEDURE — 83520 IMMUNOASSAY QUANT NOS NONAB: CPT

## 2024-07-28 PROCEDURE — 99283 EMERGENCY DEPT VISIT LOW MDM: CPT

## 2024-07-28 PROCEDURE — 99285 EMERGENCY DEPT VISIT HI MDM: CPT | Performed by: EMERGENCY MEDICINE

## 2024-07-28 PROCEDURE — 96361 HYDRATE IV INFUSION ADD-ON: CPT

## 2024-07-28 PROCEDURE — 93010 ELECTROCARDIOGRAM REPORT: CPT | Performed by: INTERNAL MEDICINE

## 2024-07-28 PROCEDURE — 71045 X-RAY EXAM CHEST 1 VIEW: CPT

## 2024-07-28 PROCEDURE — 93005 ELECTROCARDIOGRAM TRACING: CPT

## 2024-07-28 PROCEDURE — 80053 COMPREHEN METABOLIC PANEL: CPT

## 2024-07-28 PROCEDURE — 36415 COLL VENOUS BLD VENIPUNCTURE: CPT

## 2024-07-28 RX ORDER — LEVETIRACETAM 500 MG/5ML
1000 INJECTION, SOLUTION, CONCENTRATE INTRAVENOUS ONCE
Status: COMPLETED | OUTPATIENT
Start: 2024-07-28 | End: 2024-07-28

## 2024-07-28 RX ADMIN — SODIUM CHLORIDE 1000 ML: 0.9 INJECTION, SOLUTION INTRAVENOUS at 02:40

## 2024-07-28 RX ADMIN — LEVETIRACETAM 1000 MG: 100 INJECTION, SOLUTION INTRAVENOUS at 04:18

## 2024-07-28 NOTE — Clinical Note
Case was discussed with Dr. Agudelo and the patient's admission status was agreed to be Admission Status: observation status to the service of Dr. Roberts .

## 2024-07-28 NOTE — DISCHARGE INSTRUCTIONS
Follow-up with your primary care provider to schedule an MRI brain with and without contrast and EEG.  Referral to neurology given.

## 2024-07-28 NOTE — Clinical Note
Edd Quezada was seen and treated in our emergency department on 7/28/2024.                Diagnosis:     Edd  .    He may return on this date: 07/30/2024         If you have any questions or concerns, please don't hesitate to call.      Dex Crowe MD    ______________________________           _______________          _______________  Hospital Representative                              Date                                Time
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
## 2024-10-03 ENCOUNTER — APPOINTMENT (EMERGENCY)
Dept: RADIOLOGY | Facility: HOSPITAL | Age: 49
End: 2024-10-03

## 2024-10-03 ENCOUNTER — APPOINTMENT (EMERGENCY)
Dept: CT IMAGING | Facility: HOSPITAL | Age: 49
End: 2024-10-03

## 2024-10-03 ENCOUNTER — HOSPITAL ENCOUNTER (EMERGENCY)
Facility: HOSPITAL | Age: 49
Discharge: HOME | End: 2024-10-04
Attending: EMERGENCY MEDICINE

## 2024-10-03 DIAGNOSIS — F14.10 COCAINE ABUSE (HCC): ICD-10-CM

## 2024-10-03 DIAGNOSIS — F11.93 OPIATE WITHDRAWAL (HCC): ICD-10-CM

## 2024-10-03 DIAGNOSIS — R00.1 BRADYCARDIA: ICD-10-CM

## 2024-10-03 DIAGNOSIS — R40.4 TRANSIENT ALTERATION OF AWARENESS: Primary | ICD-10-CM

## 2024-10-03 DIAGNOSIS — R56.9 SEIZURE (HCC): ICD-10-CM

## 2024-10-03 LAB
ALBUMIN SERPL BCG-MCNC: 3.7 G/DL (ref 3.5–5)
ALP SERPL-CCNC: 93 U/L (ref 34–104)
ALT SERPL W P-5'-P-CCNC: 24 U/L (ref 7–52)
ANION GAP SERPL CALCULATED.3IONS-SCNC: 6 MMOL/L (ref 4–13)
AST SERPL W P-5'-P-CCNC: 30 U/L (ref 13–39)
ATRIAL RATE: 49 BPM
BASE EX.OXY STD BLDV CALC-SCNC: 79.3 % (ref 60–80)
BASE EXCESS BLDV CALC-SCNC: -0.9 MMOL/L
BASOPHILS # BLD AUTO: 0.02 THOUSANDS/ΜL (ref 0–0.1)
BASOPHILS NFR BLD AUTO: 0 % (ref 0–1)
BILIRUB SERPL-MCNC: 0.46 MG/DL (ref 0.2–1)
BUN SERPL-MCNC: 14 MG/DL (ref 5–25)
CALCIUM SERPL-MCNC: 8.6 MG/DL (ref 8.4–10.2)
CARDIAC TROPONIN I PNL SERPL HS: <2 NG/L
CHLORIDE SERPL-SCNC: 108 MMOL/L (ref 96–108)
CK SERPL-CCNC: 100 U/L (ref 39–308)
CO2 SERPL-SCNC: 24 MMOL/L (ref 21–32)
CREAT SERPL-MCNC: 0.86 MG/DL (ref 0.6–1.3)
EOSINOPHIL # BLD AUTO: 0 THOUSAND/ΜL (ref 0–0.61)
EOSINOPHIL NFR BLD AUTO: 0 % (ref 0–6)
ERYTHROCYTE [DISTWIDTH] IN BLOOD BY AUTOMATED COUNT: 13 % (ref 11.6–15.1)
ETHANOL SERPL-MCNC: <10 MG/DL
FLUAV RNA RESP QL NAA+PROBE: NEGATIVE
FLUBV RNA RESP QL NAA+PROBE: NEGATIVE
GFR SERPL CREATININE-BSD FRML MDRD: 103 ML/MIN/1.73SQ M
GLUCOSE SERPL-MCNC: 101 MG/DL (ref 65–140)
HCO3 BLDV-SCNC: 23.2 MMOL/L (ref 24–30)
HCT VFR BLD AUTO: 41.2 % (ref 36.5–49.3)
HGB BLD-MCNC: 13.7 G/DL (ref 12–17)
IMM GRANULOCYTES # BLD AUTO: 0.07 THOUSAND/UL (ref 0–0.2)
IMM GRANULOCYTES NFR BLD AUTO: 1 % (ref 0–2)
LACTATE SERPL-SCNC: 0.9 MMOL/L (ref 0.5–2)
LYMPHOCYTES # BLD AUTO: 2.1 THOUSANDS/ΜL (ref 0.6–4.47)
LYMPHOCYTES NFR BLD AUTO: 15 % (ref 14–44)
MAGNESIUM SERPL-MCNC: 1.9 MG/DL (ref 1.9–2.7)
MCH RBC QN AUTO: 30.4 PG (ref 26.8–34.3)
MCHC RBC AUTO-ENTMCNC: 33.3 G/DL (ref 31.4–37.4)
MCV RBC AUTO: 91 FL (ref 82–98)
MONOCYTES # BLD AUTO: 0.66 THOUSAND/ΜL (ref 0.17–1.22)
MONOCYTES NFR BLD AUTO: 5 % (ref 4–12)
NEUTROPHILS # BLD AUTO: 11.54 THOUSANDS/ΜL (ref 1.85–7.62)
NEUTS SEG NFR BLD AUTO: 79 % (ref 43–75)
NRBC BLD AUTO-RTO: 0 /100 WBCS
O2 CT BLDV-SCNC: 16.8 ML/DL
P AXIS: 64 DEGREES
PCO2 BLDV: 36.9 MM HG (ref 42–50)
PH BLDV: 7.42 [PH] (ref 7.3–7.4)
PHOSPHATE SERPL-MCNC: 4.1 MG/DL (ref 2.7–4.5)
PLATELET # BLD AUTO: 303 THOUSANDS/UL (ref 149–390)
PMV BLD AUTO: 10.5 FL (ref 8.9–12.7)
PO2 BLDV: 46.4 MM HG (ref 35–45)
POTASSIUM SERPL-SCNC: 4.4 MMOL/L (ref 3.5–5.3)
PR INTERVAL: 114 MS
PROCALCITONIN SERPL-MCNC: <0.05 NG/ML
PROT SERPL-MCNC: 6.4 G/DL (ref 6.4–8.4)
QRS AXIS: 65 DEGREES
QRSD INTERVAL: 82 MS
QT INTERVAL: 486 MS
QTC INTERVAL: 439 MS
RBC # BLD AUTO: 4.51 MILLION/UL (ref 3.88–5.62)
RSV RNA RESP QL NAA+PROBE: NEGATIVE
SARS-COV-2 RNA RESP QL NAA+PROBE: NEGATIVE
SODIUM SERPL-SCNC: 138 MMOL/L (ref 135–147)
T WAVE AXIS: 65 DEGREES
VENTRICULAR RATE: 49 BPM
WBC # BLD AUTO: 14.39 THOUSAND/UL (ref 4.31–10.16)

## 2024-10-03 PROCEDURE — 83735 ASSAY OF MAGNESIUM: CPT | Performed by: EMERGENCY MEDICINE

## 2024-10-03 PROCEDURE — 82805 BLOOD GASES W/O2 SATURATION: CPT | Performed by: EMERGENCY MEDICINE

## 2024-10-03 PROCEDURE — 70450 CT HEAD/BRAIN W/O DYE: CPT

## 2024-10-03 PROCEDURE — 82077 ASSAY SPEC XCP UR&BREATH IA: CPT | Performed by: EMERGENCY MEDICINE

## 2024-10-03 PROCEDURE — 82550 ASSAY OF CK (CPK): CPT | Performed by: EMERGENCY MEDICINE

## 2024-10-03 PROCEDURE — 84145 PROCALCITONIN (PCT): CPT | Performed by: EMERGENCY MEDICINE

## 2024-10-03 PROCEDURE — 84100 ASSAY OF PHOSPHORUS: CPT | Performed by: EMERGENCY MEDICINE

## 2024-10-03 PROCEDURE — 71045 X-RAY EXAM CHEST 1 VIEW: CPT

## 2024-10-03 PROCEDURE — 0241U HB NFCT DS VIR RESP RNA 4 TRGT: CPT | Performed by: EMERGENCY MEDICINE

## 2024-10-03 PROCEDURE — 96365 THER/PROPH/DIAG IV INF INIT: CPT

## 2024-10-03 PROCEDURE — 99285 EMERGENCY DEPT VISIT HI MDM: CPT | Performed by: EMERGENCY MEDICINE

## 2024-10-03 PROCEDURE — 87040 BLOOD CULTURE FOR BACTERIA: CPT | Performed by: EMERGENCY MEDICINE

## 2024-10-03 PROCEDURE — 80053 COMPREHEN METABOLIC PANEL: CPT | Performed by: EMERGENCY MEDICINE

## 2024-10-03 PROCEDURE — 99284 EMERGENCY DEPT VISIT MOD MDM: CPT

## 2024-10-03 PROCEDURE — 93005 ELECTROCARDIOGRAM TRACING: CPT

## 2024-10-03 PROCEDURE — 83605 ASSAY OF LACTIC ACID: CPT | Performed by: EMERGENCY MEDICINE

## 2024-10-03 PROCEDURE — 96361 HYDRATE IV INFUSION ADD-ON: CPT

## 2024-10-03 PROCEDURE — 36415 COLL VENOUS BLD VENIPUNCTURE: CPT | Performed by: EMERGENCY MEDICINE

## 2024-10-03 PROCEDURE — 84484 ASSAY OF TROPONIN QUANT: CPT | Performed by: EMERGENCY MEDICINE

## 2024-10-03 PROCEDURE — 85025 COMPLETE CBC W/AUTO DIFF WBC: CPT | Performed by: EMERGENCY MEDICINE

## 2024-10-03 RX ORDER — CEFTRIAXONE 1 G/50ML
1000 INJECTION, SOLUTION INTRAVENOUS ONCE
Status: COMPLETED | OUTPATIENT
Start: 2024-10-03 | End: 2024-10-03

## 2024-10-03 RX ORDER — ORPHENADRINE CITRATE 30 MG/ML
60 INJECTION INTRAMUSCULAR; INTRAVENOUS ONCE
Status: DISCONTINUED | OUTPATIENT
Start: 2024-10-03 | End: 2024-10-03

## 2024-10-03 RX ADMIN — CEFTRIAXONE 1000 MG: 1 INJECTION, SOLUTION INTRAVENOUS at 20:25

## 2024-10-03 RX ADMIN — SODIUM CHLORIDE 1000 ML: 0.9 INJECTION, SOLUTION INTRAVENOUS at 19:39

## 2024-10-04 VITALS
TEMPERATURE: 98.4 F | SYSTOLIC BLOOD PRESSURE: 125 MMHG | HEART RATE: 67 BPM | OXYGEN SATURATION: 100 % | DIASTOLIC BLOOD PRESSURE: 62 MMHG | RESPIRATION RATE: 18 BRPM

## 2024-10-04 LAB
ATRIAL RATE: 49 BPM
P AXIS: 45 DEGREES
PR INTERVAL: 108 MS
QRS AXIS: 61 DEGREES
QRSD INTERVAL: 88 MS
QT INTERVAL: 480 MS
QTC INTERVAL: 433 MS
T WAVE AXIS: 70 DEGREES
VENTRICULAR RATE: 49 BPM

## 2024-10-04 PROCEDURE — 93010 ELECTROCARDIOGRAM REPORT: CPT | Performed by: INTERNAL MEDICINE

## 2024-10-04 RX ORDER — ALPRAZOLAM 1 MG
1 TABLET ORAL 3 TIMES DAILY
Qty: 6 TABLET | Refills: 0 | Status: SHIPPED | OUTPATIENT
Start: 2024-10-04 | End: 2024-10-06

## 2024-10-04 RX ORDER — BUPRENORPHINE AND NALOXONE 8; 2 MG/1; MG/1
8 FILM, SOLUBLE BUCCAL; SUBLINGUAL ONCE
Status: COMPLETED | OUTPATIENT
Start: 2024-10-04 | End: 2024-10-04

## 2024-10-04 RX ADMIN — BUPRENORPHINE AND NALOXONE 8 MG: 8; 2 FILM BUCCAL; SUBLINGUAL at 01:58

## 2024-10-04 NOTE — ED NOTES
Report received, care assumed at this time. Pt resting quietly with eyes closed. Chest rise and fall observed. No need voiced.      Brandy Henderson RN  10/03/24 7206

## 2024-10-04 NOTE — ED PROVIDER NOTES
Final diagnoses:   Transient alteration of awareness   Seizure (HCC)   Opiate withdrawal (HCC)   Cocaine abuse (HCC)   Bradycardia     ED Disposition       ED Disposition   Discharge    Condition   Stable    Date/Time   Fri Oct 4, 2024  2:24 AM    Comment   Edd Quezada discharge to home/self care.                   Assessment & Plan       Medical Decision Making  AMS - Differential includes post-ictal period versus drug ingestion.  Symptoms resolved prior to discharge.  Back to normal mental status with normal neuro exam.    Bradycardia - Lizandro in upper 30s with normal blood pressure while sleeping.  No heart block, normal electrolytes.  Unclear if this is due to ingestion or cocaine withdrawal.  Bradycardia was asymptomatic, and resolved prior to discharge.    Opiate withdrawal - Treated with suboxone.    Benzo withdrawal - Patient chronically on xanax and at risk for withdrawal so bridging rx provided to prevent withdrawal.  Patient has both risk of overdose and withdrawal making treatment difficulty with risks regardless of how treated.  Rehab placement offered but declined.      Amount and/or Complexity of Data Reviewed  Labs: ordered. Decision-making details documented in ED Course.  Radiology: ordered and independent interpretation performed.    Risk  Prescription drug management.  Decision regarding hospitalization.  Diagnosis or treatment significantly limited by social determinants of health.        ED Course as of 10/04/24 0439   u Oct 03, 2024   1927 EKG :SB at 49 with normal QRS, normal ST-t, QTc 433   1945 Patient being evaluated for altered mental status - differential includes post-ictal period, drug intoxication.  Doubt benzo withdrawal as patient has no tachycardia, excitation, hypertension, diaphoresis.    Bradycardia - Will r/o electrolyte abnormality.  Differential also includes cocaine withdrawal and drug ingestion.  Will r/o ACS.   1959 WBC(!): 14.39  Left shift with leukocytosis  concerning for infection.  CXR suspicious for pneumonia so procalcitonin and cultures added on, and antibiotics ordered.   2013 Bradycardia noted to 39.  Patient maintains normal blood pressure.  He is drowsy but arouses to verbal stimuli alone at this time.     2020 I reviewed CXR.  Increased markings on left side, but patient has been lying on left side and resistant to repositioning.  Unclear if this represents infiltrate versus effect of positioning.  Will medicate and await further testing.   2044 Procalcitonin: <0.05  Not suspicious for sepsis.   2044 pH, Holden(!): 7.416  Not concerning for CO2 retention.   2045 CT reviewed by me while awaiting radiology read.  No gross bleed visualized by me.   2108 CXR reading reviewed by radiologist not suggestive of infiltrate.  In light of this with negative procalcitonin, pneumonia no longer suspected.   2242 I re-evaluated the patient. His speech is no longer slurred.  He is more easily aroused.  No confusion in my talking with him.  He says he may have taken a medicine/drug to help him with withdrawal symptoms but is not sure.   2343 Patient reassessed.  He arouses to physical stimuli now but not verbal alone.  Speech still no longer slurred.  He did complaint to wife of opiate withdrawal for which he has not taken his suboxone in 5 days.  Patient too drowsy now to medicate so will continue to observe.  Wife notes this is how patient typically looks after seizures.   Fri Oct 04, 2024   0137  Patient continues to have bradycardia but with normal BP.  No severe hypertension or tachycardia, no diaphoresis or tremors to suggest benzo withdrawal.  Patient is cocaine user, and bradycardia may be secondary to cocaine withdrawal.  Normal trop, no syncope, no hypotension, normal electrolytes.  Will continue to monitor.  Bradycardia is sinus without evidence of heart block.   0203 Patient awoke spontaneously and asked me in room.  Repeat heart rate 60 now the patient awake.  He  does admit to opiate withdrawal currently.  He reports his last use of Suboxone was 5 days ago.  He reports he did use illicit opiates 3 days ago but none since then.  He is familiar with precipitated withdrawal and would like to restart Suboxone.  He believes risks outweigh benefits.  Will treat.   0226 Repeat heart rate 67.  Patient walking around, awake and alert.  RN found patient to have a crap pipe on him.  Patient now insisting on discharge.  He is no longer bradycardic, and is back to mental status as confirmed both by me and wife.  He reports relief from opiate withdrawal from suboxone.         Medications   sodium chloride 0.9 % bolus 1,000 mL (0 mL Intravenous Stopped 10/3/24 2125)   cefTRIAXone (ROCEPHIN) IVPB (premix in dextrose) 1,000 mg 50 mL (0 mg Intravenous Stopped 10/3/24 2125)   buprenorphine-naloxone (Suboxone) film 8 mg (8 mg Sublingual Given 10/4/24 0158)       ED Risk Strat Scores   HEART Risk Score      Flowsheet Row Most Recent Value   Heart Score Risk Calculator    History 0 Filed at: 10/03/2024 2347   ECG 0 Filed at: 10/03/2024 2347   Age 1 Filed at: 10/03/2024 2347   Risk Factors 1 Filed at: 10/03/2024 2347   Troponin 0 Filed at: 10/03/2024 2347   HEART Score 2 Filed at: 10/03/2024 2347                               SBIRT 20yo+      Flowsheet Row Most Recent Value   Initial Alcohol Screen: US AUDIT-C     1. How often do you have a drink containing alcohol? 0 Filed at: 10/03/2024 2126   2. How many drinks containing alcohol do you have on a typical day you are drinking?  0 Filed at: 10/03/2024 2126   3a. Male UNDER 65: How often do you have five or more drinks on one occasion? 0 Filed at: 10/03/2024 2126   3b. FEMALE Any Age, or MALE 65+: How often do you have 4 or more drinks on one occassion? 0 Filed at: 10/03/2024 2126   Audit-C Score 0 Filed at: 10/03/2024 2126   FINA: How many times in the past year have you...    Used an illegal drug or used a prescription medication for non-medical  "reasons? Weekly Filed at: 10/03/2024 2126   DAST-10: In the past 12 months...    1. Have you used drugs other than those required for medical reasons? 1 Filed at: 10/03/2024 2126   2. Do you use more than one drug at a time? 1 Filed at: 10/03/2024 2126   3. Have you had medical problems as a result of your drug use (e.g., memory loss, hepatitis, convulsions, bleeding, etc.)? 1 Filed at: 10/03/2024 2126   4. Have you had \"blackouts\" or \"flashbacks\" as a result of drug use?YesNo 1 Filed at: 10/03/2024 2126   5. Do you ever feel bad or guilty about your drug use? 1 Filed at: 10/03/2024 2126   6. Does your spouse (or parent) ever complain about your involvement with drugs? 1 Filed at: 10/03/2024 2126   7. Have you neglected your family because of your use of drugs? 1 Filed at: 10/03/2024 2126   8. Have you engaged in illegal activities in order to obtain drugs? 1 Filed at: 10/03/2024 2126   9. Have you ever experienced withdrawal symptoms (felt sick) when you stopped taking drugs? 1 Filed at: 10/03/2024 2126   10. Are you always able to stop using drugs when you want to? 1 Filed at: 10/03/2024 2126   DAST-10 Score 10 Filed at: 10/03/2024 2126                            History of Present Illness       Chief Complaint   Patient presents with    Medical Problem     Pt presents to the ed via ems for using drugs and not feeling well        Past Medical History:   Diagnosis Date    Low back pain with sciatica     Seizure (HCC)     Vertigo       Past Surgical History:   Procedure Laterality Date    ESOPHAGOGASTRODUODENOSCOPY N/A     KNEE ARTHROSCOPY Right     VT BX/EXC LYMPH NODE OPEN SUPERFICIAL Left 1/24/2017    Procedure: INGUINAL LYMPH NODE BIOPSY WITH FLOW CYTOMETRY;  Surgeon: Steffen Munguia DO;  Location: [facility];  Service: General      No family history on file.   Social History     Tobacco Use    Smoking status: Some Days     Current packs/day: 1.00     Average packs/day: 1 pack/day for 25.0 years (25.0 ttl " "pk-yrs)     Types: Cigarettes    Smokeless tobacco: Current    Tobacco comments:     Vape   Vaping Use    Vaping status: Former    Substances: Nicotine   Substance Use Topics    Alcohol use: Not Currently     Comment: rarely    Drug use: No      E-Cigarette/Vaping    E-Cigarette Use Former User       E-Cigarette/Vaping Substances    Nicotine Yes       I have reviewed and agree with the history as documented.     Patient presents via EMS or police was called as the patient was acting strange.  He was walking around, trying to open random cars.  Police noted him to be confused prompting EMS to be called.  Patient admits to using crack at 11 AM he also admits to using benzos.  He tells me that he is not in benzo withdrawal, and adds, \"it's unusual for me to come here and not be in withdrawal from xanax.\"        Medical Problem      Review of Systems   Unable to perform ROS: Mental status change           Objective       ED Triage Vitals   Temperature Pulse Blood Pressure Respirations SpO2 Patient Position - Orthostatic VS   10/03/24 1923 10/03/24 1920 10/03/24 1923 10/03/24 1920 10/03/24 1925 10/03/24 1923   98.4 °F (36.9 °C) 57 117/71 18 100 % Lying      Temp Source Heart Rate Source BP Location FiO2 (%) Pain Score    10/03/24 1923 10/03/24 1920 10/03/24 1923 -- --    Oral Monitor Right arm        Vitals      Date and Time Temp Pulse SpO2 Resp BP Pain Score FACES Pain Rating User   10/04/24 0215 -- 67 100 % 18 -- -- -- CC   10/04/24 0100 -- 43 98 % 20 125/62 -- -- CC   10/04/24 0030 -- 40 98 % 18 129/60 -- -- CC   10/04/24 0000 -- 41 97 % 20 169/77 -- -- CC   10/03/24 2345 -- 40 98 % 18 131/79 -- -- CC   10/03/24 2300 -- 42 97 % 18 112/64 -- -- CC   10/03/24 2115 -- 61 97 % 19 120/80 -- -- JK   10/03/24 1925 -- -- 100 % -- -- -- -- SD   10/03/24 1923 98.4 °F (36.9 °C) -- -- -- 117/71 -- -- SD   10/03/24 1920 -- 57 -- 18 -- -- -- SD            Physical Exam  Vitals and nursing note reviewed.   Constitutional:       " General: He is not in acute distress.     Appearance: He is well-developed.      Comments: Drowsy but arouses to physical stimulation   HENT:      Head: Normocephalic and atraumatic.   Eyes:      Conjunctiva/sclera: Conjunctivae normal.   Cardiovascular:      Rate and Rhythm: Regular rhythm. Bradycardia present.      Heart sounds: No murmur heard.  Pulmonary:      Effort: Pulmonary effort is normal. No respiratory distress.      Breath sounds: Normal breath sounds.   Abdominal:      Palpations: Abdomen is soft.      Tenderness: There is no abdominal tenderness.   Musculoskeletal:         General: No swelling.      Cervical back: Neck supple.   Skin:     General: Skin is warm and dry.      Capillary Refill: Capillary refill takes less than 2 seconds.   Neurological:      Comments: Patient requires physical stimulation to wake up and answer questions.  Once awoke, speech slurred.  Patient able to answer some questions appropriately but other times goes on incomprehensible asides and cannot be redirected    No facial droop.  Moves all extremities spontaneously.     Psychiatric:         Mood and Affect: Mood normal.         Results Reviewed       Procedure Component Value Units Date/Time    Blood culture #1 [[id number]] Collected: 10/03/24 2024    Lab Status: Preliminary result Specimen: Blood from Arm, Left Updated: 10/04/24 0137     Blood Culture Received in Microbiology Lab. Culture in Progress.    Blood culture #2 [[id number]] Collected: 10/03/24 2024    Lab Status: Preliminary result Specimen: Blood from Arm, Right Updated: 10/04/24 0112     Blood Culture Received in Microbiology Lab. Culture in Progress.    FLU/RSV/COVID - if FLU/RSV clinically relevant (2hr TAT) [[id number]]  (Normal) Collected: 10/03/24 2024    Lab Status: Final result Specimen: Nares from Nose Updated: 10/03/24 2115     SARS-CoV-2 Negative     INFLUENZA A PCR Negative     INFLUENZA B PCR Negative     RSV PCR Negative    Narrative:      This test  has been performed using the CoV-2/Flu/RSV plus assay on the [employer] GeneXpert platform. This test has been validated by the  and verified by the performing laboratory.     This test is designed to amplify and detect the following: nucleocapsid (N), envelope (E), and RNA-dependent RNA polymerase (RdRP) genes of the SARS-CoV-2 genome; matrix (M), basic polymerase (PB2), and acidic protein (PA) segments of the influenza A genome; matrix (M) and non-structural protein (NS) segments of the influenza B genome, and the nucleocapsid genes of RSV A and RSV B.     Positive results are indicative of the presence of Flu A, Flu B, RSV, and/or SARS-CoV-2 RNA. Positive results for SARS-CoV-2 or suspected novel influenza should be reported to state, local, or federal health departments according to local reporting requirements.      All results should be assessed in conjunction with clinical presentation and other laboratory markers for clinical management.     FOR PEDIATRIC PATIENTS - copy/paste COVID Guidelines URL to browser: [email or web address]       Procalcitonin [[id number]]  (Normal) Collected: 10/03/24 1940    Lab Status: Final result Specimen: Blood from Arm, Left Updated: 10/03/24 2043     Procalcitonin <0.05 ng/ml     Blood gas, venous [[id number]]  (Abnormal) Collected: 10/03/24 2024    Lab Status: Final result Specimen: Blood from Arm, Right Updated: 10/03/24 2036     pH, Holden 7.416     pCO2, Holden 36.9 mm Hg      pO2, Holden 46.4 mm Hg      HCO3, Holden 23.2 mmol/L      Base Excess, Holden -0.9 mmol/L      O2 Content, Holden 16.8 ml/dL      O2 HGB, VENOUS 79.3 %     Comprehensive metabolic panel [[id number]] Collected: 10/03/24 1940    Lab Status: Final result Specimen: Blood from Arm, Left Updated: 10/03/24 2010     Sodium 138 mmol/L      Potassium 4.4 mmol/L      Chloride 108 mmol/L      CO2 24 mmol/L      ANION GAP 6 mmol/L      BUN 14 mg/dL      Creatinine 0.86  mg/dL      Glucose 101 mg/dL      Calcium 8.6 mg/dL      AST 30 U/L      ALT 24 U/L      Alkaline Phosphatase 93 U/L      Total Protein 6.4 g/dL      Albumin 3.7 g/dL      Total Bilirubin 0.46 mg/dL      eGFR 103 ml/min/1.73sq m     Narrative:      National Kid[employer] Disease Foundation guidelines for Chronic Kidney Disease (CKD):     Stage 1 with normal or high GFR (GFR > 90 mL/min/1.73 square meters)    Stage 2 Mild CKD (GFR = 60-89 mL/min/1.73 square meters)    Stage 3A Moderate CKD (GFR = 45-59 mL/min/1.73 square meters)    Stage 3B Moderate CKD (GFR = 30-44 mL/min/1.73 square meters)    Stage 4 Severe CKD (GFR = 15-29 mL/min/1.73 square meters)    Stage 5 End Stage CKD (GFR <15 mL/min/1.73 square meters)  Note: GFR calculation is accurate only with a steady state creatinine    Magnesium [[id number]]  (Normal) Collected: 10/03/24 1940    Lab Status: Final result Specimen: Blood from Arm, Left Updated: 10/03/24 2010     Magnesium 1.9 mg/dL     Phosphorus [[id number]]  (Normal) Collected: 10/03/24 1940    Lab Status: Final result Specimen: Blood from Arm, Left Updated: 10/03/24 2010     Phosphorus 4.1 mg/dL     CK [[id number]]  (Normal) Collected: 10/03/24 1940    Lab Status: Final result Specimen: Blood from Arm, Left Updated: 10/03/24 2010     Total  U/L     HS Troponin 0hr (reflex protocol) [[id number]]  (Normal) Collected: 10/03/24 1940    Lab Status: Final result Specimen: Blood from Arm, Left Updated: 10/03/24 2009     hs TnI 0hr <2 ng/L     Ethanol [[id number]]  (Normal) Collected: 10/03/24 1940    Lab Status: Final result Specimen: Blood from Arm, Left Updated: 10/03/24 2009     Ethanol Lvl <10 mg/dL     Lactic acid, plasma (w/reflex if result > 2.0) [[id number]]  (Normal) Collected: 10/03/24 1940    Lab Status: Final result Specimen: Blood from Arm, Left Updated: 10/03/24 2009     LACTIC ACID 0.9 mmol/L     Narrative:      Result may be elevated if tourniquet was used during collection.    CBC and  differential [[id number]]  (Abnormal) Collected: 10/03/24 1940    Lab Status: Final result Specimen: Blood from Arm, Left Updated: 10/03/24 1946     WBC 14.39 Thousand/uL      RBC 4.51 Million/uL      Hemoglobin 13.7 g/dL      Hematocrit 41.2 %      MCV 91 fL      MCH 30.4 pg      MCHC 33.3 g/dL      RDW 13.0 %      MPV 10.5 fL      Platelets 303 Thousands/uL      nRBC 0 /100 WBCs      Segmented % 79 %      Immature Grans % 1 %      Lymphocytes % 15 %      Monocytes % 5 %      Eosinophils Relative 0 %      Basophils Relative 0 %      Absolute Neutrophils 11.54 Thousands/µL      Absolute Immature Grans 0.07 Thousand/uL      Absolute Lymphocytes 2.10 Thousands/µL      Absolute Monocytes 0.66 Thousand/µL      Eosinophils Absolute 0.00 Thousand/µL      Basophils Absolute 0.02 Thousands/µL             CT head without contrast   Final Interpretation by Dequan Teran MD (10/03 2045)      No acute intracranial abnormality.                  Workstation performed: [id number]         XR chest 1 view portable   ED Interpretation by Howard Verduzco MD (10/03 1959)   Concerning for left infiltrate      Final Interpretation by Anna Zuniga MD (10/03 2103)      No acute cardiopulmonary disease.            Workstation performed: [id number]             Procedures    ED Medication and Procedure Management   Prior to Admission Medications   Prescriptions Last Dose Informant Patient Reported? Taking?   ALPRAZolam (XANAX) 1 mg tablet   Yes No   Sig: Take 1 mg by mouth 3 (three) times a day   ALPRAZolam (XANAX) 1 mg tablet   No No   Sig: Take 1 tablet (1 mg total) by mouth 3 (three) times a day as needed for anxiety for up to 1 day   ALPRAZolam (XANAX) 1 mg tablet   No Yes   Sig: Take 1 tablet (1 mg total) by mouth 3 (three) times a day for 2 days   Buprenorphine HCl-Naloxone HCl 12-3 MG FILM   Yes Yes   Sig: Place 8 mg under the tongue once   acetaminophen (TYLENOL) 325 mg tablet   Yes No   Sig: Take 650 mg by mouth  every 6 (six) hours as needed for mild pain   calcium carbonate (TUMS) 500 mg chewable tablet   Yes No   Sig: Chew 2 tablets 2 (two) times a day   citalopram (CeleXA) 40 mg tablet   Yes No   Sig: Take 40 mg by mouth daily   levETIRAcetam (Keppra) 500 mg tablet   No No   Sig: Take 1 tablet (500 mg total) by mouth daily for 7 days, THEN 1 tablet (500 mg total) 2 (two) times a day for 21 days.   meclizine (ANTIVERT) 25 mg tablet   No No   Sig: Take 1 tablet (25 mg total) by mouth 3 (three) times a day as needed for dizziness   Patient not taking: Reported on 5/13/2024   naproxen (NAPROSYN) 500 mg tablet   No No   Sig: Take 1 tablet (500 mg total) by mouth 2 (two) times a day as needed for mild pain   Patient not taking: Reported on 5/13/2024   ondansetron (ZOFRAN) 4 mg tablet   No No   Sig: Take 1 tablet (4 mg total) by mouth every 6 (six) hours   Patient not taking: Reported on 5/13/2024   pantoprazole (PROTONIX) 40 mg tablet   No No   Sig: Take 1 tablet (40 mg total) by mouth daily   Patient not taking: Reported on 11/12/2020   tobramycin-dexamethasone (TOBRADEX) ophthalmic suspension   No No   Sig: Administer 1 drop to the right eye every 4 (four) hours while awake   Patient not taking: Reported on 5/13/2024      Facility-Administered Medications: None     Discharge Medication List as of 10/4/2024  2:26 AM        CONTINUE these medications which have CHANGED    Details   ALPRAZolam (XANAX) 1 mg tablet Take 1 tablet (1 mg total) by mouth 3 (three) times a day for 2 days, Starting Fri 10/4/2024, Until Sun 10/6/2024, Normal           CONTINUE these medications which have NOT CHANGED    Details   Buprenorphine HCl-Naloxone HCl 12-3 MG FILM Place 8 mg under the tongue once, Historical Med      acetaminophen (TYLENOL) 325 mg tablet Take 650 mg by mouth every 6 (six) hours as needed for mild pain, Until Discontinued, Historical Med      calcium carbonate (TUMS) 500 mg chewable tablet Chew 2 tablets 2 (two) times a day,  Until Discontinued, Historical Med      citalopram (CeleXA) 40 mg tablet Take 40 mg by mouth daily, Historical Med      levETIRAcetam (Keppra) 500 mg tablet Multiple Dosages:Starting Sun 7/21/2024, Until Sat 7/27/2024 at 2359, THEN Starting Sun 7/28/2024, Until Sat 8/17/2024 at 2359Take 1 tablet (500 mg total) by mouth daily for 7 days, THEN 1 tablet (500 mg total) 2 (two) times a day for 21 days., Normal      meclizine (ANTIVERT) 25 mg tablet Take 1 tablet (25 mg total) by mouth 3 (three) times a day as needed for dizziness, Starting Wed 1/11/2023, Normal      naproxen (NAPROSYN) 500 mg tablet Take 1 tablet (500 mg total) by mouth 2 (two) times a day as needed for mild pain, Starting Mon 2/19/2024, Normal      ondansetron (ZOFRAN) 4 mg tablet Take 1 tablet (4 mg total) by mouth every 6 (six) hours, Starting Wed 1/11/2023, Normal      pantoprazole (PROTONIX) 40 mg tablet Take 1 tablet (40 mg total) by mouth daily, Starting Tue 10/6/2020, Normal      tobramycin-dexamethasone (TOBRADEX) ophthalmic suspension Administer 1 drop to the right eye every 4 (four) hours while awake, Starting Mon 1/9/2023, Normal           No discharge procedures on file.  ED SEPSIS DOCUMENTATION   Time reflects when diagnosis was documented in both MDM as applicable and the Disposition within this note       Time User Action Codes Description Comment    10/4/2024  2:24 AM Howard Verduzco [R40.4] Transient alteration of awareness     10/4/2024  2:26 AM Howard Verduzco [R56.9] Seizure (HCC)     10/4/2024  4:38 AM Howard Verduzco [F11.93] Opiate withdrawal ([facility])     10/4/2024  4:38 AM Howard Verduzco [F14.10] Cocaine abuse ([facility])     10/4/2024  4:38 AM Howard Verduzco [R00.1] Bradycardia                  Howard Verduzco MD  10/04/24 0439

## 2024-10-04 NOTE — ED CARE HANDOFF
[facility] Warm Handoff Outcome Note    Patient name Edd Quezada  Location ED 03/ED 03 MRN [id number]  Age: 49 y.o.          Plan Type:  Warm Handoff                                                                                    Plan Date: 10/4/2024  Service:  ED Warm Handoff      Substance Use History:  Polysub    Warm Handoff Update:  Pt discharged before WHO was able to meet with him    Warm Handoff Outcome: Patient Refused

## 2024-10-07 LAB
BACTERIA BLD CULT: NORMAL
BACTERIA BLD CULT: NORMAL

## 2024-10-09 LAB
BACTERIA BLD CULT: NORMAL
BACTERIA BLD CULT: NORMAL

## 2025-02-10 ENCOUNTER — HOSPITAL ENCOUNTER (EMERGENCY)
Facility: HOSPITAL | Age: 50
Discharge: HOME | End: 2025-02-10
Attending: EMERGENCY MEDICINE | Admitting: EMERGENCY MEDICINE

## 2025-02-10 ENCOUNTER — APPOINTMENT (EMERGENCY)
Dept: CT IMAGING | Facility: HOSPITAL | Age: 50
End: 2025-02-10

## 2025-02-10 VITALS
BODY MASS INDEX: 22.53 KG/M2 | RESPIRATION RATE: 18 BRPM | SYSTOLIC BLOOD PRESSURE: 128 MMHG | DIASTOLIC BLOOD PRESSURE: 68 MMHG | HEIGHT: 73 IN | OXYGEN SATURATION: 98 % | TEMPERATURE: 97.3 F | HEART RATE: 102 BPM | WEIGHT: 170 LBS
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
## 2025-02-10 DIAGNOSIS — S09.90XA INJURY OF HEAD, INITIAL ENCOUNTER: Primary | ICD-10-CM

## 2025-02-10 PROCEDURE — 99284 EMERGENCY DEPT VISIT MOD MDM: CPT | Performed by: EMERGENCY MEDICINE

## 2025-02-10 PROCEDURE — 72125 CT NECK SPINE W/O DYE: CPT

## 2025-02-10 PROCEDURE — 99284 EMERGENCY DEPT VISIT MOD MDM: CPT

## 2025-02-10 PROCEDURE — 70450 CT HEAD/BRAIN W/O DYE: CPT

## 2025-02-10 RX ORDER — ACETAMINOPHEN 325 MG/1
975 TABLET ORAL ONCE
Status: COMPLETED | OUTPATIENT
Start: 2025-02-10 | End: 2025-02-10

## 2025-02-10 RX ADMIN — ACETAMINOPHEN 975 MG: 325 TABLET, FILM COATED ORAL at 04:55
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
## 2025-02-10 NOTE — DISCHARGE INSTRUCTIONS
Follow up with your primary doctor/outpatient providers, and return to the emergency department for new or worsening symptoms.      CT BRAIN - WITHOUT CONTRAST     INDICATION:   head injury.     COMPARISON: 10/3/2024     TECHNIQUE:  CT examination of the brain was performed.  Multiplanar 2D reformatted images were created from the source data.     Radiation dose length product (DLP) for this visit:  884.5 mGy-cm .  This examination, like all CT scans performed in the [facility], was performed utilizing techniques to minimize radiation dose exposure, including the use of iterative   reconstruction and automated exposure control.     IMAGE QUALITY:  Diagnostic.     FINDINGS:     PARENCHYMA:No intracranial mass, mass effect or midline shift. No CT signs of acute infarction.  No acute parenchymal hemorrhage.     VENTRICLES AND EXTRA-AXIAL SPACES:  Normal for the patient's age.     VISUALIZED ORBITS:  Normal visualized orbits.     PARANASAL SINUSES:  Normal visualized paranasal sinuses.     CALVARIUM AND EXTRACRANIAL SOFT TISSUES: Unremarkable     IMPRESSION:     No acute intracranial abnormality.        CT CERVICAL SPINE - WITHOUT CONTRAST     INDICATION:   head injury.     COMPARISON:  None.     TECHNIQUE:  CT examination of the cervical spine was performed without intravenous contrast.  Contiguous axial images were obtained. Multiplanar 2D reformatted images were created from the source data.     Radiation dose length product (DLP) for this visit:  239.2 mGy-cm .  This examination, like all CT scans performed in the [facility], was performed utilizing techniques to minimize radiation dose exposure, including the use of iterative   reconstruction and automated exposure control.     IMAGE QUALITY:  Diagnostic.     FINDINGS:     ALIGNMENT:  Normal alignment of the cervical spine. No subluxation.     VERTEBRAE:  No acute cervical spine fracture. Vertebral body heights are preserved. Mild  multilevel degenerative endplate changes of the mid to lower cervical spine.     DEGENERATIVE CHANGES:  Mild multilevel cervical degenerative changes are noted without critical central canal stenosis.     PREVERTEBRAL AND PARASPINAL SOFT TISSUES: Unremarkable     THORACIC INLET: Unremarkable     IMPRESSION:     Mild multilevel degenerative changes without acute cervical spine fracture or traumatic malalignment.

## 2025-04-17 ENCOUNTER — HOSPITAL ENCOUNTER (EMERGENCY)
Facility: HOSPITAL | Age: 50
Discharge: HOME | End: 2025-04-17
Attending: EMERGENCY MEDICINE

## 2025-04-17 VITALS
HEART RATE: 79 BPM | TEMPERATURE: 97.5 F | DIASTOLIC BLOOD PRESSURE: 77 MMHG | RESPIRATION RATE: 17 BRPM | OXYGEN SATURATION: 100 % | SYSTOLIC BLOOD PRESSURE: 125 MMHG

## 2025-04-17 DIAGNOSIS — Z76.0 MEDICATION REFILL: Primary | ICD-10-CM

## 2025-04-17 DIAGNOSIS — R68.89 WITHDRAWAL COMPLAINT: ICD-10-CM

## 2025-04-17 PROCEDURE — 99284 EMERGENCY DEPT VISIT MOD MDM: CPT | Performed by: EMERGENCY MEDICINE

## 2025-04-17 PROCEDURE — 99283 EMERGENCY DEPT VISIT LOW MDM: CPT

## 2025-04-17 RX ORDER — BUPRENORPHINE 2 MG/1
2 TABLET SUBLINGUAL DAILY
Qty: 30 TABLET | Refills: 0 | Status: SHIPPED | OUTPATIENT
Start: 2025-04-17 | End: 2025-05-17

## 2025-04-17 RX ORDER — BUPRENORPHINE AND NALOXONE 8; 2 MG/1; MG/1
8 FILM, SOLUBLE BUCCAL; SUBLINGUAL DAILY
Status: DISCONTINUED | OUTPATIENT
Start: 2025-04-17 | End: 2025-04-17

## 2025-04-17 RX ORDER — BUPRENORPHINE 8 MG/1
8 TABLET SUBLINGUAL DAILY
Qty: 30 TABLET | Refills: 0 | Status: SHIPPED | OUTPATIENT
Start: 2025-04-17 | End: 2025-05-17

## 2025-04-17 RX ORDER — BUPRENORPHINE AND NALOXONE 8; 2 MG/1; MG/1
8 FILM, SOLUBLE BUCCAL; SUBLINGUAL ONCE
Status: COMPLETED | OUTPATIENT
Start: 2025-04-17 | End: 2025-04-17

## 2025-04-17 RX ORDER — LORAZEPAM 1 MG/1
1 TABLET ORAL ONCE
Status: COMPLETED | OUTPATIENT
Start: 2025-04-17 | End: 2025-04-17

## 2025-04-17 RX ADMIN — LORAZEPAM 1 MG: 1 TABLET ORAL at 07:55

## 2025-04-17 RX ADMIN — BUPRENORPHINE AND NALOXONE 8 MG: 8; 2 FILM BUCCAL; SUBLINGUAL at 07:57

## 2025-04-17 NOTE — ED PROVIDER NOTES
Time reflects when diagnosis was documented in both MDM as applicable and the Disposition within this note       Time User Action Codes Description Comment    4/17/2025  7:49 AM Frederick Shell [Z76.0] Medication refill     4/17/2025  7:50 AM Frederick Shell [R68.89] Withdrawal complaint           ED Disposition       ED Disposition   Discharge    Condition   Stable    Date/Time   Thu Apr 17, 2025  7:49 AM    Comment   Edd Quezada discharge to home/self care.                   Assessment & Plan       Medical Decision Making  Patient requesting Suboxone refill.  PDMP was reviewed which reveals patient has been on Suboxone for quite some time.  I refilled the patient's Suboxone.  He is also requesting benzodiazepines.  Patient does not appear to be in acutely on benzodiazepines for at least the last few weeks.    Will provide 1 dosage in the ER but was informed that we do not prescribe this medication for the emergency department but I would be happy to admit him to a detox unit if he feels that he is addicted to the medication.  He declined admission to detox unit.    Has been off the benzodiazepines for greater than a week.  No significant risk for benzodiazepine withdrawal seizures.  Will give 1 dosage of Ativan in the emergency department and request that he follows up with a primary care provider regarding his benzodiazepines in the future.    No significant suspicion for flulike illness or other cause of patient's symptoms.    Differential including but not limited to opiate withdrawal, benzodiazepine withdrawal, nausea, piloerection.    Problems Addressed:  Medication refill: acute illness or injury  Withdrawal complaint: acute illness or injury    Risk  Prescription drug management.             Medications   LORazepam (ATIVAN) tablet 1 mg (1 mg Oral Given 4/17/25 0752)   buprenorphine-naloxone (Suboxone) film 8 mg (8 mg Sublingual Given 4/17/25 0757)       ED Risk Strat Scores                     No data recorded        SBIRT 20yo+      Flowsheet Row Most Recent Value   Initial Alcohol Screen: US AUDIT-C     1. How often do you have a drink containing alcohol? 0 Filed at: 04/17/2025 0739   2. How many drinks containing alcohol do you have on a typical day you are drinking?  0 Filed at: 04/17/2025 0739   3a. Male UNDER 65: How often do you have five or more drinks on one occasion? 0 Filed at: 04/17/2025 0739   3b. FEMALE Any Age, or MALE 65+: How often do you have 4 or more drinks on one occassion? 0 Filed at: 04/17/2025 0739   Audit-C Score 0 Filed at: 04/17/2025 0739   FINA: How many times in the past year have you...    Used an illegal drug or used a prescription medication for non-medical reasons? Never Filed at: 04/17/2025 0739                            History of Present Illness       Chief Complaint   Patient presents with    Medical Problem     Pt states going through withdrawal from suboxone 12 mg 1 x a day, xanax 1 mg TID. Last dose about a week ago. States flu like symptoms        Past Medical History:   Diagnosis Date    Low back pain with sciatica     Seizure (HCC)     Vertigo       Past Surgical History:   Procedure Laterality Date    ESOPHAGOGASTRODUODENOSCOPY N/A     KNEE ARTHROSCOPY Right     [place] BX/EXC LYMPH NODE OPEN SUPERFICIAL Left 1/24/2017    Procedure: INGUINAL LYMPH NODE BIOPSY WITH FLOW CYTOMETRY;  Surgeon: Steffen Munguia DO;  Location: [facility];  Service: General      History reviewed. No pertinent family history.   Social History     Tobacco Use    Smoking status: Some Days     Current packs/day: 1.00     Average packs/day: 1 pack/day for 25.0 years (25.0 ttl pk-yrs)     Types: Cigarettes    Smokeless tobacco: Current    Tobacco comments:     Vape   Vaping Use    Vaping status: Former    Substances: Nicotine   Substance Use Topics    Alcohol use: Not Currently     Comment: rarely    Drug use: Not Currently     Types: Marijuana      E-Cigarette/Vaping     E-Cigarette Use Former User       E-Cigarette/Vaping Substances    Nicotine Yes       I have reviewed and agree with the history as documented.     49-year-old male previous history of opiate abuse on Suboxone for 10 years presenting the emergency department for refill of his Suboxone.  Also requesting benzodiazepines.    Last dosage of Suboxone was 3 days ago.  Last benzodiazepine greater than a week.    Reports that he is supposed to be on Xanax.  Review of records reveals patient was prescribed Suboxone most recently on March 10.  Most recent clonazepam was February 14 and was given 6 dosages only.    Patient reports ports piloerection, nausea, fatigue, electric-like feelings, fatigue.  States his symptoms are consistent with his normal withdrawal symptoms.      Medical Problem  Associated symptoms: fatigue and nausea        Review of Systems   Constitutional:  Positive for chills and fatigue.   Gastrointestinal:  Positive for nausea.   All other systems reviewed and are negative.          Objective       ED Triage Vitals   Temperature Pulse Blood Pressure Respirations SpO2 Patient Position - Orthostatic VS   04/17/25 0741 04/17/25 0739 04/17/25 0739 04/17/25 0739 04/17/25 0739 04/17/25 0739   97.5 °F (36.4 °C) 79 125/77 17 100 % Lying      Temp Source Heart Rate Source BP Location FiO2 (%) Pain Score    04/17/25 0741 04/17/25 0739 04/17/25 0739 -- --    Oral Monitor Left arm        Vitals      Date and Time Temp Pulse SpO2 Resp BP Pain Score FACES Pain Rating User   04/17/25 0741 97.5 °F (36.4 °C) -- -- -- -- -- -- DB   04/17/25 0739 -- 79 100 % 17 125/77 -- -- DB            Physical Exam  Vitals and nursing note reviewed.   Constitutional:       General: He is not in acute distress.     Appearance: He is well-developed. He is not diaphoretic.   HENT:      Head: Normocephalic and atraumatic.      Right Ear: External ear normal.      Left Ear: External ear normal.   Eyes:      Conjunctiva/sclera: Conjunctivae  normal.   Neck:      Trachea: No tracheal deviation.   Cardiovascular:      Rate and Rhythm: Normal rate and regular rhythm.      Heart sounds: Normal heart sounds. No murmur heard.  Pulmonary:      Effort: No respiratory distress.      Breath sounds: Normal breath sounds. No stridor. No wheezing or rales.   Abdominal:      General: Bowel sounds are normal. There is no distension.      Palpations: Abdomen is soft. There is no mass.      Tenderness: There is no abdominal tenderness. There is no guarding or rebound.   Musculoskeletal:         General: No tenderness or deformity.   Skin:     General: Skin is dry.      Findings: No rash.   Neurological:      Motor: No abnormal muscle tone.      Coordination: Coordination normal.   Psychiatric:         Behavior: Behavior normal.         Thought Content: Thought content normal.         Judgment: Judgment normal.         Results Reviewed       None            No orders to display       Procedures    ED Medication and Procedure Management   Prior to Admission Medications   Prescriptions Last Dose Informant Patient Reported? Taking?   ALPRAZolam (XANAX) 1 mg tablet   No No   Sig: Take 1 tablet (1 mg total) by mouth 3 (three) times a day as needed for anxiety for up to 1 day   ALPRAZolam (XANAX) 1 mg tablet   No No   Sig: Take 1 tablet (1 mg total) by mouth 3 (three) times a day for 2 days   Buprenorphine HCl-Naloxone HCl 12-3 MG FILM   Yes No   Sig: Place 8 mg under the tongue once   acetaminophen (TYLENOL) 325 mg tablet   Yes No   Sig: Take 650 mg by mouth every 6 (six) hours as needed for mild pain   calcium carbonate (TUMS) 500 mg chewable tablet   Yes No   Sig: Chew 2 tablets 2 (two) times a day   citalopram (CeleXA) 40 mg tablet   Yes No   Sig: Take 40 mg by mouth daily   levETIRAcetam (Keppra) 500 mg tablet   No No   Sig: Take 1 tablet (500 mg total) by mouth daily for 7 days, THEN 1 tablet (500 mg total) 2 (two) times a day for 21 days.   meclizine (ANTIVERT) 25 mg  tablet   No No   Sig: Take 1 tablet (25 mg total) by mouth 3 (three) times a day as needed for dizziness   Patient not taking: Reported on 5/13/2024   naproxen (NAPROSYN) 500 mg tablet   No No   Sig: Take 1 tablet (500 mg total) by mouth 2 (two) times a day as needed for mild pain   Patient not taking: Reported on 5/13/2024   ondansetron (ZOFRAN) 4 mg tablet   No No   Sig: Take 1 tablet (4 mg total) by mouth every 6 (six) hours   Patient not taking: Reported on 5/13/2024   pantoprazole (PROTONIX) 40 mg tablet   No No   Sig: Take 1 tablet (40 mg total) by mouth daily   Patient not taking: Reported on 11/12/2020   tobramycin-dexamethasone (TOBRADEX) ophthalmic suspension   No No   Sig: Administer 1 drop to the right eye every 4 (four) hours while awake   Patient not taking: Reported on 5/13/2024      Facility-Administered Medications: None     Patient's Medications   Discharge Prescriptions    BUPRENORPHINE (SUBUTEX) 2 MG    Place 1 tablet (2 mg total) under the tongue daily       Start Date: 4/17/2025 End Date: 5/17/2025       Order Dose: 2 mg       Quantity: 30 tablet    Refills: 0    BUPRENORPHINE (SUBUTEX) 8 MG    Place 1 tablet (8 mg total) under the tongue daily       Start Date: 4/17/2025 End Date: 5/17/2025       Order Dose: 8 mg       Quantity: 30 tablet    Refills: 0     No discharge procedures on file.  ED SEPSIS DOCUMENTATION   Time reflects when diagnosis was documented in both MDM as applicable and the Disposition within this note       Time User Action Codes Description Comment    4/17/2025  7:49 AM Frederick Shell [Z76.0] Medication refill     4/17/2025  7:50 AM Frederick Shell [R68.89] Withdrawal complaint                  Frederick Shell DO  04/17/25 0759

## 2025-04-17 NOTE — DISCHARGE INSTRUCTIONS
Follow-up with your provider of your Suboxone and benzodiazepines.    Benzodiazepines are typically not prescribed in the emergency department.    Come back for new or worsening symptoms including but not limited to shortness of breath, chest pain.

## 2025-05-14 ENCOUNTER — HOSPITAL ENCOUNTER (EMERGENCY)
Facility: HOSPITAL | Age: 50
Discharge: HOME | End: 2025-05-14
Attending: EMERGENCY MEDICINE
Payer: COMMERCIAL

## 2025-05-14 ENCOUNTER — HOSPITAL ENCOUNTER (EMERGENCY)
Facility: HOSPITAL | Age: 50
Discharge: HOME | End: 2025-05-15
Attending: EMERGENCY MEDICINE
Payer: COMMERCIAL

## 2025-05-14 VITALS
RESPIRATION RATE: 21 BRPM | HEART RATE: 74 BPM | DIASTOLIC BLOOD PRESSURE: 78 MMHG | TEMPERATURE: 98.1 F | SYSTOLIC BLOOD PRESSURE: 135 MMHG | OXYGEN SATURATION: 100 %

## 2025-05-14 VITALS
RESPIRATION RATE: 20 BRPM | OXYGEN SATURATION: 99 % | DIASTOLIC BLOOD PRESSURE: 71 MMHG | TEMPERATURE: 98.6 F | HEART RATE: 101 BPM | SYSTOLIC BLOOD PRESSURE: 125 MMHG

## 2025-05-14 DIAGNOSIS — R25.1 EPISODE OF SHAKING: ICD-10-CM

## 2025-05-14 DIAGNOSIS — T50.901A ACCIDENTAL DRUG OVERDOSE, INITIAL ENCOUNTER: ICD-10-CM

## 2025-05-14 DIAGNOSIS — R45.1 AGITATION: Primary | ICD-10-CM

## 2025-05-14 DIAGNOSIS — R46.89 AGGRESSIVE BEHAVIOR: ICD-10-CM

## 2025-05-14 DIAGNOSIS — F19.10 POLYSUBSTANCE ABUSE (HCC): Primary | ICD-10-CM

## 2025-05-14 LAB
ALBUMIN SERPL BCG-MCNC: 3.2 G/DL (ref 3.5–5)
ALBUMIN SERPL BCG-MCNC: 3.7 G/DL (ref 3.5–5)
ALP SERPL-CCNC: 108 U/L (ref 34–104)
ALP SERPL-CCNC: 112 U/L (ref 34–104)
ALT SERPL W P-5'-P-CCNC: 37 U/L (ref 7–52)
ALT SERPL W P-5'-P-CCNC: 45 U/L (ref 7–52)
AMPHETAMINES SERPL QL SCN: POSITIVE
ANION GAP SERPL CALCULATED.3IONS-SCNC: 12 MMOL/L (ref 4–13)
ANION GAP SERPL CALCULATED.3IONS-SCNC: 3 MMOL/L (ref 4–13)
APAP SERPL-MCNC: <2 UG/ML (ref 10–20)
AST SERPL W P-5'-P-CCNC: 29 U/L (ref 13–39)
AST SERPL W P-5'-P-CCNC: 37 U/L (ref 13–39)
ATRIAL RATE: 90 BPM
BARBITURATES UR QL: NEGATIVE
BASE EXCESS BLDA CALC-SCNC: 0 MMOL/L (ref -2–3)
BASOPHILS # BLD AUTO: 0.04 THOUSANDS/ÂΜL (ref 0–0.1)
BASOPHILS # BLD AUTO: 0.05 THOUSANDS/ÂΜL (ref 0–0.1)
BASOPHILS NFR BLD AUTO: 1 % (ref 0–1)
BASOPHILS NFR BLD AUTO: 1 % (ref 0–1)
BENZODIAZ UR QL: POSITIVE
BILIRUB SERPL-MCNC: 0.31 MG/DL (ref 0.2–1)
BILIRUB SERPL-MCNC: 0.43 MG/DL (ref 0.2–1)
BUN SERPL-MCNC: 10 MG/DL (ref 5–25)
BUN SERPL-MCNC: 9 MG/DL (ref 5–25)
CA-I BLD-SCNC: 1.19 MMOL/L (ref 1.12–1.32)
CALCIUM ALBUM COR SERPL-MCNC: 7.9 MG/DL (ref 8.3–10.1)
CALCIUM SERPL-MCNC: 7.3 MG/DL (ref 8.4–10.2)
CALCIUM SERPL-MCNC: 8.7 MG/DL (ref 8.4–10.2)
CHLORIDE SERPL-SCNC: 105 MMOL/L (ref 96–108)
CHLORIDE SERPL-SCNC: 110 MMOL/L (ref 96–108)
CK SERPL-CCNC: 236 U/L (ref 39–308)
CO2 SERPL-SCNC: 21 MMOL/L (ref 21–32)
CO2 SERPL-SCNC: 27 MMOL/L (ref 21–32)
COCAINE UR QL: NEGATIVE
CREAT SERPL-MCNC: 0.7 MG/DL (ref 0.6–1.3)
CREAT SERPL-MCNC: 0.9 MG/DL (ref 0.6–1.3)
EOSINOPHIL # BLD AUTO: 0.19 THOUSAND/ÂΜL (ref 0–0.61)
EOSINOPHIL # BLD AUTO: 0.24 THOUSAND/ÂΜL (ref 0–0.61)
EOSINOPHIL NFR BLD AUTO: 2 % (ref 0–6)
EOSINOPHIL NFR BLD AUTO: 2 % (ref 0–6)
ERYTHROCYTE [DISTWIDTH] IN BLOOD BY AUTOMATED COUNT: 12.5 % (ref 11.6–15.1)
ERYTHROCYTE [DISTWIDTH] IN BLOOD BY AUTOMATED COUNT: 12.7 % (ref 11.6–15.1)
ETHANOL SERPL-MCNC: <10 MG/DL
ETHANOL SERPL-MCNC: <10 MG/DL
FENTANYL UR QL SCN: NEGATIVE
GFR SERPL CREATININE-BSD FRML MDRD: 101 ML/MIN/1.73SQ M
GFR SERPL CREATININE-BSD FRML MDRD: 112 ML/MIN/1.73SQ M
GLUCOSE SERPL-MCNC: 85 MG/DL (ref 65–140)
GLUCOSE SERPL-MCNC: 85 MG/DL (ref 65–140)
GLUCOSE SERPL-MCNC: 86 MG/DL (ref 65–140)
GLUCOSE SERPL-MCNC: 96 MG/DL (ref 65–140)
HCO3 BLDA-SCNC: 27.4 MMOL/L (ref 24–30)
HCT VFR BLD AUTO: 41.2 % (ref 36.5–49.3)
HCT VFR BLD AUTO: 42.2 % (ref 36.5–49.3)
HCT VFR BLD CALC: 40 % (ref 36.5–49.3)
HGB BLD-MCNC: 13.3 G/DL (ref 12–17)
HGB BLD-MCNC: 13.4 G/DL (ref 12–17)
HGB BLDA-MCNC: 13.6 G/DL (ref 12–17)
HYDROCODONE UR QL SCN: NEGATIVE
IMM GRANULOCYTES # BLD AUTO: 0.08 THOUSAND/UL (ref 0–0.2)
IMM GRANULOCYTES # BLD AUTO: 0.13 THOUSAND/UL (ref 0–0.2)
IMM GRANULOCYTES NFR BLD AUTO: 1 % (ref 0–2)
IMM GRANULOCYTES NFR BLD AUTO: 1 % (ref 0–2)
LYMPHOCYTES # BLD AUTO: 2.25 THOUSANDS/ÂΜL (ref 0.6–4.47)
LYMPHOCYTES # BLD AUTO: 2.98 THOUSANDS/ÂΜL (ref 0.6–4.47)
LYMPHOCYTES NFR BLD AUTO: 26 % (ref 14–44)
LYMPHOCYTES NFR BLD AUTO: 29 % (ref 14–44)
MCH RBC QN AUTO: 30 PG (ref 26.8–34.3)
MCH RBC QN AUTO: 30.4 PG (ref 26.8–34.3)
MCHC RBC AUTO-ENTMCNC: 31.8 G/DL (ref 31.4–37.4)
MCHC RBC AUTO-ENTMCNC: 32.3 G/DL (ref 31.4–37.4)
MCV RBC AUTO: 94 FL (ref 82–98)
MCV RBC AUTO: 94 FL (ref 82–98)
METHADONE UR QL: NEGATIVE
MONOCYTES # BLD AUTO: 0.55 THOUSAND/ÂΜL (ref 0.17–1.22)
MONOCYTES # BLD AUTO: 0.82 THOUSAND/ÂΜL (ref 0.17–1.22)
MONOCYTES NFR BLD AUTO: 6 % (ref 4–12)
MONOCYTES NFR BLD AUTO: 8 % (ref 4–12)
NEUTROPHILS # BLD AUTO: 5.58 THOUSANDS/ÂΜL (ref 1.85–7.62)
NEUTROPHILS # BLD AUTO: 5.92 THOUSANDS/ÂΜL (ref 1.85–7.62)
NEUTS SEG NFR BLD AUTO: 59 % (ref 43–75)
NEUTS SEG NFR BLD AUTO: 64 % (ref 43–75)
NRBC BLD AUTO-RTO: 0 /100 WBCS
NRBC BLD AUTO-RTO: 0 /100 WBCS
OPIATES UR QL SCN: NEGATIVE
OXYCODONE+OXYMORPHONE UR QL SCN: NEGATIVE
P AXIS: 63 DEGREES
PCO2 BLD: 29 MMOL/L (ref 21–32)
PCO2 BLD: 53 MM HG (ref 42–50)
PCP UR QL: NEGATIVE
PH BLD: 7.32 [PH] (ref 7.3–7.4)
PLATELET # BLD AUTO: 208 THOUSANDS/UL (ref 149–390)
PLATELET # BLD AUTO: 228 THOUSANDS/UL (ref 149–390)
PMV BLD AUTO: 10 FL (ref 8.9–12.7)
PMV BLD AUTO: 10.7 FL (ref 8.9–12.7)
PO2 BLD: 26 MM HG (ref 35–45)
POTASSIUM BLD-SCNC: 3.6 MMOL/L (ref 3.5–5.3)
POTASSIUM SERPL-SCNC: 3.4 MMOL/L (ref 3.5–5.3)
POTASSIUM SERPL-SCNC: 3.5 MMOL/L (ref 3.5–5.3)
PR INTERVAL: 136 MS
PROT SERPL-MCNC: 5.2 G/DL (ref 6.4–8.4)
PROT SERPL-MCNC: 6.4 G/DL (ref 6.4–8.4)
QRS AXIS: 62 DEGREES
QRSD INTERVAL: 104 MS
QT INTERVAL: 392 MS
QTC INTERVAL: 479 MS
RBC # BLD AUTO: 4.37 MILLION/UL (ref 3.88–5.62)
RBC # BLD AUTO: 4.47 MILLION/UL (ref 3.88–5.62)
SALICYLATES SERPL-MCNC: <5 MG/DL (ref 5–20)
SAO2 % BLD FROM PO2: 41 % (ref 60–85)
SODIUM BLD-SCNC: 142 MMOL/L (ref 136–145)
SODIUM SERPL-SCNC: 138 MMOL/L (ref 135–147)
SODIUM SERPL-SCNC: 140 MMOL/L (ref 135–147)
SPECIMEN SOURCE: ABNORMAL
T WAVE AXIS: 47 DEGREES
THC UR QL: NEGATIVE
VENTRICULAR RATE: 90 BPM
WBC # BLD AUTO: 10.14 THOUSAND/UL (ref 4.31–10.16)
WBC # BLD AUTO: 8.69 THOUSAND/UL (ref 4.31–10.16)

## 2025-05-14 PROCEDURE — 85025 COMPLETE CBC W/AUTO DIFF WBC: CPT | Performed by: EMERGENCY MEDICINE

## 2025-05-14 PROCEDURE — 82948 REAGENT STRIP/BLOOD GLUCOSE: CPT

## 2025-05-14 PROCEDURE — 93005 ELECTROCARDIOGRAM TRACING: CPT

## 2025-05-14 PROCEDURE — 80053 COMPREHEN METABOLIC PANEL: CPT | Performed by: EMERGENCY MEDICINE

## 2025-05-14 PROCEDURE — 99285 EMERGENCY DEPT VISIT HI MDM: CPT

## 2025-05-14 PROCEDURE — 96361 HYDRATE IV INFUSION ADD-ON: CPT

## 2025-05-14 PROCEDURE — 96374 THER/PROPH/DIAG INJ IV PUSH: CPT

## 2025-05-14 PROCEDURE — 99284 EMERGENCY DEPT VISIT MOD MDM: CPT

## 2025-05-14 PROCEDURE — 80053 COMPREHEN METABOLIC PANEL: CPT

## 2025-05-14 PROCEDURE — 82947 ASSAY GLUCOSE BLOOD QUANT: CPT

## 2025-05-14 PROCEDURE — 85025 COMPLETE CBC W/AUTO DIFF WBC: CPT

## 2025-05-14 PROCEDURE — 82077 ASSAY SPEC XCP UR&BREATH IA: CPT | Performed by: EMERGENCY MEDICINE

## 2025-05-14 PROCEDURE — 84295 ASSAY OF SERUM SODIUM: CPT

## 2025-05-14 PROCEDURE — 82330 ASSAY OF CALCIUM: CPT

## 2025-05-14 PROCEDURE — 36415 COLL VENOUS BLD VENIPUNCTURE: CPT | Performed by: EMERGENCY MEDICINE

## 2025-05-14 PROCEDURE — 99284 EMERGENCY DEPT VISIT MOD MDM: CPT | Performed by: EMERGENCY MEDICINE

## 2025-05-14 PROCEDURE — 82550 ASSAY OF CK (CPK): CPT

## 2025-05-14 PROCEDURE — 84132 ASSAY OF SERUM POTASSIUM: CPT

## 2025-05-14 PROCEDURE — 93010 ELECTROCARDIOGRAM REPORT: CPT | Performed by: INTERNAL MEDICINE

## 2025-05-14 PROCEDURE — 80143 DRUG ASSAY ACETAMINOPHEN: CPT | Performed by: EMERGENCY MEDICINE

## 2025-05-14 PROCEDURE — 85014 HEMATOCRIT: CPT

## 2025-05-14 PROCEDURE — 82077 ASSAY SPEC XCP UR&BREATH IA: CPT

## 2025-05-14 PROCEDURE — 82803 BLOOD GASES ANY COMBINATION: CPT

## 2025-05-14 PROCEDURE — 80179 DRUG ASSAY SALICYLATE: CPT | Performed by: EMERGENCY MEDICINE

## 2025-05-14 PROCEDURE — 80307 DRUG TEST PRSMV CHEM ANLYZR: CPT | Performed by: EMERGENCY MEDICINE

## 2025-05-14 PROCEDURE — 99285 EMERGENCY DEPT VISIT HI MDM: CPT | Performed by: EMERGENCY MEDICINE

## 2025-05-14 RX ORDER — NALOXONE HYDROCHLORIDE 0.4 MG/ML
0.5 INJECTION, SOLUTION INTRAMUSCULAR; INTRAVENOUS; SUBCUTANEOUS ONCE
Status: COMPLETED | OUTPATIENT
Start: 2025-05-14 | End: 2025-05-14

## 2025-05-14 RX ORDER — NICOTINE 21 MG/24HR
14 PATCH, TRANSDERMAL 24 HOURS TRANSDERMAL ONCE
Status: DISCONTINUED | OUTPATIENT
Start: 2025-05-14 | End: 2025-05-14 | Stop reason: HOSPADM

## 2025-05-14 RX ORDER — NALOXONE HYDROCHLORIDE 0.4 MG/ML
0.4 INJECTION, SOLUTION INTRAMUSCULAR; INTRAVENOUS; SUBCUTANEOUS
Status: DISCONTINUED | OUTPATIENT
Start: 2025-05-14 | End: 2025-05-15 | Stop reason: HOSPADM

## 2025-05-14 RX ORDER — OLANZAPINE 10 MG/2ML
10 INJECTION, POWDER, FOR SOLUTION INTRAMUSCULAR ONCE AS NEEDED
Status: DISCONTINUED | OUTPATIENT
Start: 2025-05-14 | End: 2025-05-15 | Stop reason: HOSPADM

## 2025-05-14 RX ORDER — MIDAZOLAM HYDROCHLORIDE 5 MG/ML
4 INJECTION, SOLUTION INTRAMUSCULAR; INTRAVENOUS ONCE AS NEEDED
Status: DISCONTINUED | OUTPATIENT
Start: 2025-05-14 | End: 2025-05-15 | Stop reason: HOSPADM

## 2025-05-14 RX ORDER — NALOXONE HYDROCHLORIDE 0.4 MG/ML
0.4 INJECTION, SOLUTION INTRAMUSCULAR; INTRAVENOUS; SUBCUTANEOUS ONCE
Status: COMPLETED | OUTPATIENT
Start: 2025-05-14 | End: 2025-05-14

## 2025-05-14 RX ORDER — DROPERIDOL 2.5 MG/ML
1 INJECTION, SOLUTION INTRAMUSCULAR; INTRAVENOUS ONCE
Status: COMPLETED | OUTPATIENT
Start: 2025-05-14 | End: 2025-05-14

## 2025-05-14 RX ADMIN — NALOXONE HYDROCHLORIDE 0.5 MG: 0.4 INJECTION, SOLUTION INTRAMUSCULAR; INTRAVENOUS; SUBCUTANEOUS at 13:14

## 2025-05-14 RX ADMIN — SODIUM CHLORIDE 1000 ML: 0.9 INJECTION, SOLUTION INTRAVENOUS at 13:19

## 2025-05-14 RX ADMIN — NICOTINE 14 MG: 14 PATCH, EXTENDED RELEASE TRANSDERMAL at 14:56

## 2025-05-14 RX ADMIN — NALXONE HYDROCHLORIDE 0.4 MG: 0.4 INJECTION INTRAMUSCULAR; INTRAVENOUS; SUBCUTANEOUS at 20:48

## 2025-05-14 NOTE — DISCHARGE INSTRUCTIONS
You left before we completed our workup and possible treatment or referral for detox assistance.    Please follow with your PCP today.      Return if needed.

## 2025-05-14 NOTE — ED NOTES
Patient attempting to get out of bed. This RN asked patient to remain in bed for patient safety. Patient became verbally aggressive towards this RN. Other staff members at bedside, including doctor. Patient asking for all monitoring equipment to be removed from patient. Patient redirectable at this time.      Shanti Hancock RN  05/14/25 [id number]

## 2025-05-14 NOTE — ED NOTES
Patient requesting to leave department. Provider notified. This RN will remove IV access at this time due to patient being elopement risk.      Shanti Hancock RN  05/14/25 0488

## 2025-05-14 NOTE — ED NOTES
"Patient admits to provider that \"someone got me to smoke some opiates today\"      Farrah Voss RN  05/14/25 7843    "

## 2025-05-14 NOTE — ED PROVIDER NOTES
Time reflects when diagnosis was documented in both MDM as applicable and the Disposition within this note       Time User Action Codes Description Comment    5/14/2025  3:05 PM Lawrence Melgar [F19.10] Polysubstance abuse (HCC)     5/14/2025  3:05 PM Lawrence Melgar [T50.901A] Accidental drug overdose, initial encounter           ED Disposition       ED Disposition   AMA    Condition   --    Date/Time   Wed May 14, 2025  3:06 PM    Comment   Date: 5/14/2025  Patient: Edd Quezada  Admitted: 5/14/2025 12:52 PM  Attending Provider: Lawrence Melgar DO    Edd Quezada or his authorized caregiver has made the decision for the patient to leave the emergency department against the advic e of the emergency department staff. He or his authorized caregiver has been informed and understands the inherent risks, including death, loss of consciousness, stroke, seizure, injury to others..  He or his authorized caregiver has decided to accep t the responsibility for this decision. Edd Quezada and all necessary parties have been advised that he may return for further evaluation or treatment. His condition at time of discharge was stable.  Edd Quezada had current vital signs as fol lows:  /78   Pulse 74   Temp 98.1 °F (36.7 °C) (Temporal)   Resp 21                Assessment & Plan       Medical Decision Making  50 yo M with polysubstance abuse history presents after nonintentional  drug overdose. No trauma noted. No evidence of CVA, ACS, infection. Will check labs, EKG and monitor for dysrhythmia, electrolyte abnormality, JOSSUE, toxic drug effects, respiratory depression, etc.    Labs positive for benzodiazepine and amphetamines.  Possible synthetic opiate use not ruled out.  Patient remained stable here after extended observation.  Wished to sign out AMA.  Deemed competent and signed AMA form after informed shared decision making.    Amount and/or Complexity of Data Reviewed  Independent  "Historian: EMS  External Data Reviewed: labs, ECG and notes.  Labs: ordered. Decision-making details documented in ED Course.  ECG/medicine tests: ordered and independent interpretation performed.    Risk  OTC drugs.  Prescription drug management.        ED Course as of 05/15/25 1550   Wed May 14, 2025   1458 Patient is awake and alert.  He complains that he is being told that his being here is \"his fault\", and he discussed leaving AMA to get to his job.  He decided to stay for further w/u if he could speak to his wife by phone.   A phone was provided. Will check iStat 'lytes including ionized calcium.   1500 Declines inpatient detox.   1503 Patient wants to leave AMA.  States he has a safe way home.       Medications   sodium chloride 0.9 % bolus 1,000 mL (0 mL Intravenous Stopped 5/14/25 1419)   naloxone (NARCAN) injection 0.5 mg (0.5 mg Intravenous Given 5/14/25 1314)   naloxone nasal- Given to patient by provider at discharge. (NARCAN) 4 mg/0.1 mL nasal spray 4 mg (4 mg Does not apply Given by Other 5/14/25 1511)       ED Risk Strat Scores                    No data recorded        SBIRT 20yo+      Flowsheet Row Most Recent Value   Initial Alcohol Screen: US AUDIT-C     1. How often do you have a drink containing alcohol? 0 Filed at: 05/14/2025 1254   2. How many drinks containing alcohol do you have on a typical day you are drinking?  0 Filed at: 05/14/2025 1254   3a. Male UNDER 65: How often do you have five or more drinks on one occasion? 0 Filed at: 05/14/2025 1254   3b. FEMALE Any Age, or MALE 65+: How often do you have 4 or more drinks on one occassion? 0 Filed at: 05/14/2025 1254   Audit-C Score 0 Filed at: 05/14/2025 1254   FINA: How many times in the past year have you...    Used an illegal drug or used a prescription medication for non-medical reasons? Never Filed at: 05/14/2025 1254                            History of Present Illness       Chief Complaint   Patient presents with    Overdose - " "Accidental     Pt to er via ems from [facility] in with reports that wife found him unresponsive. Narcan given which woke pt. pt denies drug use. States he used to do methamphetamines and heroin, but \"has not used in many many many years\". Reports drinking a 6 pack day. Mult epidermic needles found by ems upon arrival. Pt reports \"I took the narcan because I was told to take it after I took benzos earlier\". Reports taking 2mg of \"alprazolam I imagine\". Took ativan because he felt like he was going to have a seizure        Past Medical History:   Diagnosis Date    Low back pain with sciatica     Seizure (HCC)     Vertigo       Past Surgical History:   Procedure Laterality Date    ESOPHAGOGASTRODUODENOSCOPY N/A     KNEE ARTHROSCOPY Right     [place] BX/EXC LYMPH NODE OPEN SUPERFICIAL Left 1/24/2017    Procedure: INGUINAL LYMPH NODE BIOPSY WITH FLOW CYTOMETRY;  Surgeon: Steffen Munguia DO;  Location: [facility];  Service: General      History reviewed. No pertinent family history.   Social History[1]   E-Cigarette/Vaping    E-Cigarette Use Former User       E-Cigarette/Vaping Substances    Nicotine Yes       I have reviewed and agree with the history as documented.     49-year-old male from home by ambulance.  Medics states they were told he was \"nodding out\". He received Narcan on the scene and became more responsive.  Patient is cooperative here but somewhat sleepy.  He says he has been using opiates fairly constantly and smokes about a bundle of heroin nearly daily. States he had some alcohol within the past day as well.  Medical record shows that he has been on Suboxone.  Has had benzodiazepine misuse with possible withdrawal seizure in the past.  Denies recent illness or injury.  States works regularly.  States his wife left him and he has some personal problems; that is why he uses drugs.  Denies SI and HI.  States he has been through detox in the past.  Old records reviewed.  Has frequent ED visits for similar " symptoms.        Review of Systems   Unable to perform ROS: Mental status change           Objective       ED Triage Vitals   Temperature Pulse Blood Pressure Respirations SpO2 Patient Position - Orthostatic VS   05/14/25 1258 05/14/25 1254 05/14/25 1257 05/14/25 1254 05/14/25 1254 05/14/25 1257   98.1 °F (36.7 °C) 90 115/58 18 99 % Lying      Temp Source Heart Rate Source BP Location FiO2 (%) Pain Score    05/14/25 1258 05/14/25 1254 -- -- --    Temporal Monitor         Vitals      Date and Time Temp Pulse SpO2 Resp BP Pain Score FACES Pain Rating User   05/14/25 1445 -- 74 100 % 21 135/78 -- -- BJ   05/14/25 1345 -- 76 100 % 16 124/62 -- -- RD   05/14/25 1330 -- 78 98 % 20 121/67 -- -- RD   05/14/25 1315 -- 102 100 % 18 115/65 -- -- RD   05/14/25 1258 98.1 °F (36.7 °C) -- -- -- -- -- -- HR   05/14/25 1257 -- -- -- -- 115/58 -- -- HR   05/14/25 1254 -- 90 99 % 18 -- -- -- HR            Physical Exam  Vitals and nursing note reviewed.   Constitutional:       General: He is not in acute distress.     Appearance: He is well-developed and normal weight. He is not ill-appearing or diaphoretic.   HENT:      Head: Normocephalic and atraumatic.      Right Ear: External ear normal.      Left Ear: External ear normal.     Eyes:      General: No scleral icterus.     Conjunctiva/sclera: Conjunctivae normal.     Neck:      Vascular: No JVD.     Cardiovascular:      Rate and Rhythm: Normal rate and regular rhythm.      Pulses: Normal pulses.   Pulmonary:      Effort: Pulmonary effort is normal. No respiratory distress.   Abdominal:      Palpations: Abdomen is soft.      Tenderness: There is no abdominal tenderness.     Musculoskeletal:         General: No tenderness. Normal range of motion.      Cervical back: Neck supple.     Skin:     General: Skin is warm and dry.      Findings: No rash.     Neurological:      General: No focal deficit present.      Mental Status: He is alert and oriented to person, place, and time. Mental  status is at baseline.      Cranial Nerves: No cranial nerve deficit.      Coordination: Coordination normal.      Deep Tendon Reflexes: Reflexes are normal and symmetric.     Psychiatric:         Mood and Affect: Mood normal.         Behavior: Behavior normal.         Results Reviewed       Procedure Component Value Units Date/Time    POCT Blood Gas (CG8+) [[id number]]  (Abnormal) Collected: 05/14/25 1457    Lab Status: Final result Specimen: Venous Updated: 05/14/25 1847     ph, [place]en ISTAT 7.322     pCO2, Holden i-STAT 53.0 mm HG      pO2, Holden i-STAT 26.0 mm HG      BE, i-STAT 0 mmol/L      HCO3, Holden i-STAT 27.4 mmol/L      CO2, i-STAT 29 mmol/L      O2 Sat, i-STAT 41 %      SODIUM, I-STAT 142 mmol/l      Potassium, i-STAT 3.6 mmol/L      Calcium, Ionized i-STAT 1.19 mmol/L      Hct, i-STAT 40 %      Hgb, i-STAT 13.6 g/dl      Glucose, i-STAT 85 mg/dl      Specimen Type VENOUS    Rapid drug screen, urine [[id number]]  (Abnormal) Collected: 05/14/25 1444    Lab Status: Final result Specimen: Urine, Clean Catch Updated: 05/14/25 1500     Amph/Meth UR Positive     Barbiturate Ur Negative     Benzodiazepine Urine Positive     Cocaine Urine Negative     Methadone Urine Negative     Opiate Urine Negative     PCP Ur Negative     THC Urine Negative     Oxycodone Urine Negative     Fentanyl Urine Negative     HYDROCODONE URINE Negative    Narrative:      Presumptive report. If requested, specimen will be sent to reference lab for confirmation.  FOR MEDICAL PURPOSES ONLY.   IF CONFIRMATION NEEDED PLEASE CONTACT THE LAB WITHIN 5 DAYS.    Drug Screen Cutoff Levels:  AMPHETAMINE/METHAMPHETAMINES  1000 ng/mL  BARBITURATES     200 ng/mL  BENZODIAZEPINES     200 ng/mL  COCAINE      300 ng/mL  METHADONE      300 ng/mL  OPIATES      300 ng/mL  PHENCYCLIDINE     25 ng/mL  THC       50 ng/mL  OXYCODONE      100 ng/mL  FENTANYL      5 ng/mL  HYDROCODONE     300 ng/mL    Comprehensive metabolic panel [[id number]]  (Abnormal) Collected:  05/14/25 1315    Lab Status: Final result Specimen: Blood from Arm, Left Updated: 05/14/25 1347     Sodium 140 mmol/L      Potassium 3.4 mmol/L      Chloride 110 mmol/L      CO2 27 mmol/L      ANION GAP 3 mmol/L      BUN 9 mg/dL      Creatinine 0.70 mg/dL      Glucose 86 mg/dL      Calcium 7.3 mg/dL      Corrected Calcium 7.9 mg/dL      AST 29 U/L      ALT 37 U/L      Alkaline Phosphatase 108 U/L      Total Protein 5.2 g/dL      Albumin 3.2 g/dL      Total Bilirubin 0.31 mg/dL      eGFR 112 ml/min/1.73sq m     Narrative:      National Kidney Disease Foundation guidelines for Chronic Kidney Disease (CKD):     Stage 1 with normal or high GFR (GFR > 90 mL/min/1.73 square meters)    Stage 2 Mild CKD (GFR = 60-89 mL/min/1.73 square meters)    Stage 3A Moderate CKD (GFR = 45-59 mL/min/1.73 square meters)    Stage 3B Moderate CKD (GFR = 30-44 mL/min/1.73 square meters)    Stage 4 Severe CKD (GFR = 15-29 mL/min/1.73 square meters)    Stage 5 End Stage CKD (GFR <15 mL/min/1.73 square meters)  Note: GFR calculation is accurate only with a steady state creatinine    Salicylate level [[id number]]  (Abnormal) Collected: 05/14/25 1315    Lab Status: Final result Specimen: Blood from Arm, Left Updated: 05/14/25 1347     Salicylate Lvl <5 mg/dL     Acetaminophen level-If concentration is detectable, please discuss with medical  on call. [[id number]]  (Abnormal) Collected: 05/14/25 1315    Lab Status: Final result Specimen: Blood from Arm, Left Updated: 05/14/25 1347     Acetaminophen Level <2 ug/mL     Ethanol [[id number]]  (Normal) Collected: 05/14/25 1315    Lab Status: Final result Specimen: Blood from Arm, Left Updated: 05/14/25 1336     Ethanol Lvl <10 mg/dL     CBC and differential [[id number]] Collected: 05/14/25 1315    Lab Status: Final result Specimen: Blood from Arm, Left Updated: 05/14/25 1321     WBC 10.14 Thousand/uL      RBC 4.47 Million/uL      Hemoglobin 13.4 g/dL      Hematocrit 42.2 %      MCV 94 fL       MCH 30.0 pg      MCHC 31.8 g/dL      RDW 12.5 %      MPV 10.0 fL      Platelets 228 Thousands/uL      nRBC 0 /100 WBCs      Segmented % 59 %      Immature Grans % 1 %      Lymphocytes % 29 %      Monocytes % 8 %      Eosinophils Relative 2 %      Basophils Relative 1 %      Absolute Neutrophils 5.92 Thousands/µL      Absolute Immature Grans 0.13 Thousand/uL      Absolute Lymphocytes 2.98 Thousands/µL      Absolute Monocytes 0.82 Thousand/µL      Eosinophils Absolute 0.24 Thousand/µL      Basophils Absolute 0.05 Thousands/µL             No orders to display       ECG 12 Lead Documentation Only    Date/Time: 5/14/2025 1:02 PM    Performed by: Lawrence Melgar DO  Authorized by: Lawrence Melgar DO    ECG reviewed by me, the ED Provider: yes    Patient location:  ED  Previous ECG:     Previous ECG:  Compared to current    Comparison ECG info:  Ventricular rate has increased by 41 bpm    Similarity:  Changes noted    Comparison to cardiac monitor: Yes    Interpretation:     Interpretation: normal        ED Medication and Procedure Management   Prior to Admission Medications   Prescriptions Last Dose Informant Patient Reported? Taking?   acetaminophen (TYLENOL) 325 mg tablet   Yes No   Sig: Take 650 mg by mouth every 6 (six) hours as needed for mild pain   buprenorphine (SUBUTEX) 2 mg   No No   Sig: Place 1 tablet (2 mg total) under the tongue daily   buprenorphine (SUBUTEX) 8 mg   No No   Sig: Place 1 tablet (8 mg total) under the tongue daily   calcium carbonate (TUMS) 500 mg chewable tablet   Yes No   Sig: Chew 2 tablets in the morning and 2 tablets in the evening.   citalopram (CeleXA) 40 mg tablet   Yes No   Sig: Take 40 mg by mouth in the morning.   levETIRAcetam (Keppra) 500 mg tablet   No No   Sig: Take 1 tablet (500 mg total) by mouth daily for 7 days, THEN 1 tablet (500 mg total) 2 (two) times a day for 21 days.   meclizine (ANTIVERT) 25 mg tablet   No No   Sig: Take 1 tablet (25 mg total) by mouth 3 (three)  times a day as needed for dizziness   Patient not taking: Reported on 5/13/2024   naproxen (NAPROSYN) 500 mg tablet   No No   Sig: Take 1 tablet (500 mg total) by mouth 2 (two) times a day as needed for mild pain   Patient not taking: Reported on 5/13/2024   ondansetron (ZOFRAN) 4 mg tablet   No No   Sig: Take 1 tablet (4 mg total) by mouth every 6 (six) hours   Patient not taking: Reported on 5/13/2024   pantoprazole (PROTONIX) 40 mg tablet   No No   Sig: Take 1 tablet (40 mg total) by mouth daily   Patient not taking: Reported on 11/12/2020   tobramycin-dexamethasone (TOBRADEX) ophthalmic suspension   No No   Sig: Administer 1 drop to the right eye every 4 (four) hours while awake   Patient not taking: Reported on 5/13/2024      Facility-Administered Medications: None     Discharge Medication List as of 5/14/2025  3:11 PM        CONTINUE these medications which have NOT CHANGED    Details   acetaminophen (TYLENOL) 325 mg tablet Take 650 mg by mouth every 6 (six) hours as needed for mild pain, Until Discontinued, Historical Med      !! buprenorphine (SUBUTEX) 2 mg Place 1 tablet (2 mg total) under the tongue daily, Starting Thu 4/17/2025, Until Sat 5/17/2025, Normal      !! buprenorphine (SUBUTEX) 8 mg Place 1 tablet (8 mg total) under the tongue daily, Starting Thu 4/17/2025, Until Sat 5/17/2025, Normal      calcium carbonate (TUMS) 500 mg chewable tablet Chew 2 tablets 2 (two) times a day, Until Discontinued, Historical Med      citalopram (CeleXA) 40 mg tablet Take 40 mg by mouth daily, Historical Med      levETIRAcetam (Keppra) 500 mg tablet Multiple Dosages:Starting Sun 7/21/2024, Until Sat 7/27/2024 at 2359, THEN Starting Sun 7/28/2024, Until Sat 8/17/2024 at 2359Take 1 tablet (500 mg total) by mouth daily for 7 days, THEN 1 tablet (500 mg total) 2 (two) times a day for 21 days., Normal      meclizine (ANTIVERT) 25 mg tablet Take 1 tablet (25 mg total) by mouth 3 (three) times a day as needed for dizziness,  Starting Wed 1/11/2023, Normal      naproxen (NAPROSYN) 500 mg tablet Take 1 tablet (500 mg total) by mouth 2 (two) times a day as needed for mild pain, Starting Mon 2/19/2024, Normal      ondansetron (ZOFRAN) 4 mg tablet Take 1 tablet (4 mg total) by mouth every 6 (six) hours, Starting Wed 1/11/2023, Normal      pantoprazole (PROTONIX) 40 mg tablet Take 1 tablet (40 mg total) by mouth daily, Starting Tue 10/6/2020, Normal      tobramycin-dexamethasone (TOBRADEX) ophthalmic suspension Administer 1 drop to the right eye every 4 (four) hours while awake, Starting Mon 1/9/2023, Normal       !! - Potential duplicate medications found. Please discuss with provider.        No discharge procedures on file.  ED SEPSIS DOCUMENTATION   Time reflects when diagnosis was documented in both MDM as applicable and the Disposition within this note       Time User Action Codes Description Comment    5/14/2025  3:05 PM Lawrence Melgar Add [F19.10] Polysubstance abuse (HCC)     5/14/2025  3:05 PM Lawrence Melgar Add [T50.901A] Accidental drug overdose, initial encounter                      [1]   Social History  Tobacco Use    Smoking status: Some Days     Current packs/day: 1.00     Average packs/day: 1 pack/day for 25.0 years (25.0 ttl pk-yrs)     Types: Cigarettes    Smokeless tobacco: Current    Tobacco comments:     Vape   Vaping Use    Vaping status: Former    Substances: Nicotine   Substance Use Topics    Alcohol use: Not Currently     Comment: rarely    Drug use: Not Currently     Types: Marijuana        Lawrence Melgar DO  05/15/25 1551

## 2025-05-15 LAB
ATRIAL RATE: 91 BPM
P AXIS: 71 DEGREES
PR INTERVAL: 142 MS
QRS AXIS: 63 DEGREES
QRSD INTERVAL: 98 MS
QT INTERVAL: 384 MS
QTC INTERVAL: 473 MS
T WAVE AXIS: 63 DEGREES
VENTRICULAR RATE: 91 BPM

## 2025-05-15 PROCEDURE — 93010 ELECTROCARDIOGRAM REPORT: CPT | Performed by: INTERNAL MEDICINE

## 2025-05-15 NOTE — RESTRAINT FACE TO FACE
Restraint Face to Face   Edd Quezada 49 y.o. male MRN: [id number]  Unit/Bed#: ED 07 Encounter: [id number]      Physical Evaluation: Agitated, not following commands, not cooperative or answering questions  Purpose for Restraints/ Seclusion: High risk for causing significant disruption of treatment environment , High risk for harm to others, and high risk for flight  Patient's reaction to the intervention: Unchanged  Patient's medical condition: Stable  Patient's Behavioral condition: Agitated  Restraints to be initiated and Continued

## 2025-05-15 NOTE — ED NOTES
RN noticed pt become unresponsive to verbal stimuli, withdrew to pain.  MD made aware and came to bedside to assess pt.     Polina Turner RN  05/14/25 2839

## 2025-05-15 NOTE — DISCHARGE INSTRUCTIONS
MEDICALLY CLEARED FOR INCARCERATION.    Please follow up with your PCP or neurologist as soon as possible. Please refrain from any IV or recreational drug use.    Please return to the Emergency Department if you experience worsening of your current symptoms or any new/other concerning symptoms.

## 2025-05-15 NOTE — ED ATTENDING ATTESTATION
5/14/2025  I, Ha Montes MD, saw and evaluated the patient. I have discussed the patient with the resident/non-physician practitioner and agree with the resident's/non-physician practitioner's findings, Plan of Care, and MDM as documented in the resident's/non-physician practitioner's note, except where noted. All available labs and Radiology studies were reviewed.  I was present for key portions of any procedure(s) performed by the resident/non-physician practitioner and I was immediately available to provide assistance.       At this point I agree with the current assessment done in the Emergency Department.  I have conducted an independent evaluation of this patient a history and physical is as follows:    Impression: Acute encephalopathy  Differential diagnosis: Seizure with atypical postictal state, drug and alcohol intoxication acute psychosis    Patient brought in by EMS and law enforcement patient is a 49-year-old male with a known history of seizure disorder polysubstance abuse who presents for acute change in mental status.  EMS was contacted by law enforcement to come to [facility] after suspected seizure activity by patient who is currently detained.  Patient was transferred to ambulance and then began to have a low enforcement EMS requiring trying teasing by law enforcement and chemical restraint by EMS patient received a total of 5 mg of Versed as well as 5 mg of droperidol and presents sedated upon arrival.  No external signs of trauma.    Miotic pupils patient protecting airway.  Will withdrawal to pain symmetrically tolerating secretions    Patient had trial of Narcan without effect.    Plan check screening labs, will continue to monitor patient clinically sober reassess anticipate discharge back into police custody upon reassessment    Labs reviewed without abnormality.  Patient reassessed now alert conversant lucid following commands appears confused as to the details of events of this  evening.    Plan to discharge patient medically cleared for police custody          ED Course  ED Course as of 05/15/25 1553   Thu May 15, 2025   0034 At time is 12:34 AM patient reassessed now alert conversant lucid does not recall the events of earlier evening when picked up by law enforcement or EMS.  Will contact police anticipate discharge             Critical Care Time  Procedures

## 2025-05-22 ENCOUNTER — APPOINTMENT (EMERGENCY)
Dept: RADIOLOGY | Facility: HOSPITAL | Age: 50
End: 2025-05-22
Payer: COMMERCIAL

## 2025-05-22 ENCOUNTER — HOSPITAL ENCOUNTER (EMERGENCY)
Facility: HOSPITAL | Age: 50
Discharge: HOME | End: 2025-05-22
Attending: EMERGENCY MEDICINE
Payer: COMMERCIAL

## 2025-05-22 VITALS
BODY MASS INDEX: 22.53 KG/M2 | RESPIRATION RATE: 16 BRPM | HEIGHT: 73 IN | TEMPERATURE: 97.7 F | HEART RATE: 84 BPM | OXYGEN SATURATION: 97 % | SYSTOLIC BLOOD PRESSURE: 124 MMHG | DIASTOLIC BLOOD PRESSURE: 75 MMHG | WEIGHT: 170 LBS

## 2025-05-22 DIAGNOSIS — G40.919 BREAKTHROUGH SEIZURE (HCC): Primary | ICD-10-CM

## 2025-05-22 LAB
ALBUMIN SERPL BCG-MCNC: 4.1 G/DL (ref 3.5–5)
ALP SERPL-CCNC: 114 U/L (ref 34–104)
ALT SERPL W P-5'-P-CCNC: 39 U/L (ref 7–52)
AMPHETAMINES SERPL QL SCN: POSITIVE
ANION GAP SERPL CALCULATED.3IONS-SCNC: 10 MMOL/L (ref 4–13)
AST SERPL W P-5'-P-CCNC: 30 U/L (ref 13–39)
BACTERIA UR QL AUTO: ABNORMAL /HPF
BARBITURATES UR QL: NEGATIVE
BASOPHILS # BLD AUTO: 0.04 THOUSANDS/ÂΜL (ref 0–0.1)
BASOPHILS NFR BLD AUTO: 1 % (ref 0–1)
BENZODIAZ UR QL: NEGATIVE
BILIRUB SERPL-MCNC: 0.47 MG/DL (ref 0.2–1)
BILIRUB UR QL STRIP: NEGATIVE
BUN SERPL-MCNC: 13 MG/DL (ref 5–25)
CALCIUM SERPL-MCNC: 9 MG/DL (ref 8.4–10.2)
CHLORIDE SERPL-SCNC: 104 MMOL/L (ref 96–108)
CLARITY UR: CLEAR
CO2 SERPL-SCNC: 24 MMOL/L (ref 21–32)
COCAINE UR QL: POSITIVE
COLOR UR: YELLOW
CREAT SERPL-MCNC: 0.83 MG/DL (ref 0.6–1.3)
EOSINOPHIL # BLD AUTO: 0.14 THOUSAND/ÂΜL (ref 0–0.61)
EOSINOPHIL NFR BLD AUTO: 2 % (ref 0–6)
ERYTHROCYTE [DISTWIDTH] IN BLOOD BY AUTOMATED COUNT: 12.3 % (ref 11.6–15.1)
FENTANYL UR QL SCN: NEGATIVE
GFR SERPL CREATININE-BSD FRML MDRD: 104 ML/MIN/1.73SQ M
GLUCOSE SERPL-MCNC: 126 MG/DL (ref 65–140)
GLUCOSE UR STRIP-MCNC: NEGATIVE MG/DL
HCT VFR BLD AUTO: 41.6 % (ref 36.5–49.3)
HGB BLD-MCNC: 14 G/DL (ref 12–17)
HGB UR QL STRIP.AUTO: NEGATIVE
HYALINE CASTS #/AREA URNS LPF: ABNORMAL /LPF
HYDROCODONE UR QL SCN: NEGATIVE
IMM GRANULOCYTES # BLD AUTO: 0.03 THOUSAND/UL (ref 0–0.2)
IMM GRANULOCYTES NFR BLD AUTO: 0 % (ref 0–2)
KETONES UR STRIP-MCNC: NEGATIVE MG/DL
LEUKOCYTE ESTERASE UR QL STRIP: NEGATIVE
LYMPHOCYTES # BLD AUTO: 2.47 THOUSANDS/ÂΜL (ref 0.6–4.47)
LYMPHOCYTES NFR BLD AUTO: 31 % (ref 14–44)
MCH RBC QN AUTO: 30.5 PG (ref 26.8–34.3)
MCHC RBC AUTO-ENTMCNC: 33.7 G/DL (ref 31.4–37.4)
MCV RBC AUTO: 91 FL (ref 82–98)
METHADONE UR QL: NEGATIVE
MONOCYTES # BLD AUTO: 0.55 THOUSAND/ÂΜL (ref 0.17–1.22)
MONOCYTES NFR BLD AUTO: 7 % (ref 4–12)
MUCOUS THREADS UR QL AUTO: ABNORMAL
NEUTROPHILS # BLD AUTO: 4.78 THOUSANDS/ÂΜL (ref 1.85–7.62)
NEUTS SEG NFR BLD AUTO: 59 % (ref 43–75)
NITRITE UR QL STRIP: NEGATIVE
NON-SQ EPI CELLS URNS QL MICRO: ABNORMAL /HPF
NRBC BLD AUTO-RTO: 0 /100 WBCS
OPIATES UR QL SCN: NEGATIVE
OXYCODONE+OXYMORPHONE UR QL SCN: NEGATIVE
PCP UR QL: NEGATIVE
PH UR STRIP.AUTO: 6.5 [PH]
PLATELET # BLD AUTO: 237 THOUSANDS/UL (ref 149–390)
PMV BLD AUTO: 10.3 FL (ref 8.9–12.7)
POTASSIUM SERPL-SCNC: 4.1 MMOL/L (ref 3.5–5.3)
PROT SERPL-MCNC: 6.9 G/DL (ref 6.4–8.4)
PROT UR STRIP-MCNC: ABNORMAL MG/DL
RBC # BLD AUTO: 4.59 MILLION/UL (ref 3.88–5.62)
RBC #/AREA URNS AUTO: ABNORMAL /HPF
SODIUM SERPL-SCNC: 138 MMOL/L (ref 135–147)
SP GR UR STRIP.AUTO: 1.02 (ref 1–1.03)
THC UR QL: NEGATIVE
UROBILINOGEN UR STRIP-ACNC: 2 MG/DL
VALPROATE SERPL-MCNC: <10 UG/ML (ref 50–125)
WBC # BLD AUTO: 8.01 THOUSAND/UL (ref 4.31–10.16)
WBC #/AREA URNS AUTO: ABNORMAL /HPF

## 2025-05-22 PROCEDURE — 96361 HYDRATE IV INFUSION ADD-ON: CPT

## 2025-05-22 PROCEDURE — 36415 COLL VENOUS BLD VENIPUNCTURE: CPT | Performed by: EMERGENCY MEDICINE

## 2025-05-22 PROCEDURE — 81001 URINALYSIS AUTO W/SCOPE: CPT | Performed by: EMERGENCY MEDICINE

## 2025-05-22 PROCEDURE — 80307 DRUG TEST PRSMV CHEM ANLYZR: CPT | Performed by: EMERGENCY MEDICINE

## 2025-05-22 PROCEDURE — 71045 X-RAY EXAM CHEST 1 VIEW: CPT

## 2025-05-22 PROCEDURE — 80053 COMPREHEN METABOLIC PANEL: CPT | Performed by: EMERGENCY MEDICINE

## 2025-05-22 PROCEDURE — 99284 EMERGENCY DEPT VISIT MOD MDM: CPT | Performed by: EMERGENCY MEDICINE

## 2025-05-22 PROCEDURE — 96360 HYDRATION IV INFUSION INIT: CPT

## 2025-05-22 PROCEDURE — 85025 COMPLETE CBC W/AUTO DIFF WBC: CPT | Performed by: EMERGENCY MEDICINE

## 2025-05-22 PROCEDURE — 80164 ASSAY DIPROPYLACETIC ACD TOT: CPT | Performed by: EMERGENCY MEDICINE

## 2025-05-22 PROCEDURE — 99284 EMERGENCY DEPT VISIT MOD MDM: CPT

## 2025-05-22 RX ORDER — CLONAZEPAM 0.5 MG/1
0.5 TABLET ORAL ONCE
Status: COMPLETED | OUTPATIENT
Start: 2025-05-22 | End: 2025-05-22

## 2025-05-22 RX ORDER — DIVALPROEX SODIUM 250 MG/1
250 TABLET, FILM COATED, EXTENDED RELEASE ORAL DAILY
Status: DISCONTINUED | OUTPATIENT
Start: 2025-05-23 | End: 2025-05-22

## 2025-05-22 RX ORDER — DIVALPROEX SODIUM 250 MG/1
250 TABLET, FILM COATED, EXTENDED RELEASE ORAL ONCE
Status: COMPLETED | OUTPATIENT
Start: 2025-05-22 | End: 2025-05-22

## 2025-05-22 RX ORDER — DIVALPROEX SODIUM 250 MG/1
250 TABLET, FILM COATED, EXTENDED RELEASE ORAL DAILY
Qty: 10 TABLET | Refills: 0 | Status: SHIPPED | OUTPATIENT
Start: 2025-05-22 | End: 2025-06-01

## 2025-05-22 RX ORDER — CLONAZEPAM 0.5 MG/1
0.5 TABLET ORAL 2 TIMES DAILY
Qty: 20 TABLET | Refills: 0 | Status: SHIPPED | OUTPATIENT
Start: 2025-05-22 | End: 2025-06-01

## 2025-05-22 RX ADMIN — SODIUM CHLORIDE 1000 ML: 0.9 INJECTION, SOLUTION INTRAVENOUS at 17:34

## 2025-05-22 RX ADMIN — CLONAZEPAM 0.5 MG: 0.5 TABLET ORAL at 17:27

## 2025-05-22 RX ADMIN — SODIUM CHLORIDE 1000 ML: 0.9 INJECTION, SOLUTION INTRAVENOUS at 18:45

## 2025-05-22 RX ADMIN — DIVALPROEX SODIUM 250 MG: 250 TABLET, FILM COATED, EXTENDED RELEASE ORAL at 17:34

## 2025-05-22 NOTE — Clinical Note
Edd Quezada was seen and treated in our emergency department on 5/22/2025.                Diagnosis:     Edd  may return to work on return date.    He may return on this date: 05/24/2025         If you have any questions or concerns, please don't hesitate to call.      Jonathan Elizondo, DO    ______________________________           _______________          _______________  Hospital Representative                              Date                                Time

## 2025-05-22 NOTE — ED PROVIDER NOTES
Time reflects when diagnosis was documented in both MDM as applicable and the Disposition within this note       Time User Action Codes Description Comment    5/22/2025  8:58 PM Jonathan Elizondo Add [G40.919] Breakthrough seizure (HCC)           ED Disposition       ED Disposition   Discharge    Condition   Stable    Date/Time   u May 22, 2025  8:58 PM    Comment   Edd Quezada discharge to home/self care.                   Assessment & Plan       Medical Decision Making  49-year-old male presents the ED states bystanders told him he had    Patient pulled out his IV he and his girlfriend got upset states that they have to leave because she has to work.  Advised he would follow-up I advised him his urine results are not back yet and his chest x-ray has not been read they still would like to leave.    Amount and/or Complexity of Data Reviewed  Labs: ordered.  Radiology: ordered.    Risk  Prescription drug management.             Medications   sodium chloride 0.9 % bolus 1,000 mL (0 mL Intravenous Stopped 5/22/25 1834)   clonazePAM (KlonoPIN) tablet 0.5 mg (0.5 mg Oral Given 5/22/25 1727)   divalproex sodium (DEPAKOTE ER) 24 hr tablet 250 mg (250 mg Oral Given 5/22/25 1734)   sodium chloride 0.9 % bolus 1,000 mL (1,000 mL Intravenous New Bag 5/22/25 1845)       ED Risk Strat Scores                    No data recorded        SBIRT 20yo+      Flowsheet Row Most Recent Value   Initial Alcohol Screen: US AUDIT-C     1. How often do you have a drink containing alcohol? 0 Filed at: 05/22/2025 1647   2. How many drinks containing alcohol do you have on a typical day you are drinking?  0 Filed at: 05/22/2025 1647   3a. Male UNDER 65: How often do you have five or more drinks on one occasion? 0 Filed at: 05/22/2025 1647   3b. FEMALE Any Age, or MALE 65+: How often do you have 4 or more drinks on one occassion? 0 Filed at: 05/22/2025 1647   Audit-C Score 0 Filed at: 05/22/2025 1647   FINA: How many times in the past year  "have you...    Used an illegal drug or used a prescription medication for non-medical reasons? Never Filed at: 05/22/2025 0649                            History of Present Illness       Chief Complaint   Patient presents with    Seizure - Prior Hx Of     Brought by friend who stated he was in and out of a seizure for 15 minutes and had to be sternal rubbed as he was not breathing. Pt states hx of seizures but has been out of meds for \"a while\" did not bite tongue or have incontinence        Past Medical History[1]   Past Surgical History[2]   Family History[3]   Social History[4]   E-Cigarette/Vaping    E-Cigarette Use Former User       E-Cigarette/Vaping Substances    Nicotine Yes       I have reviewed and agree with the history as documented.     49-year-old male presents the ED history of seizures states he has them all the time takes Depakote for the seizures states he used to take Keppra but they stopped it due to side effects that he did not like.  States he has been doing fairly good but still occasionally does have seizures.  States he ran out of his medicines couple weeks ago has not taken any of them since that time.  Patient also, on clonazepam.  Currently awake and alert no other complaints.  No noticeable injuries from his seizure today        Review of Systems   Constitutional:  Negative for activity change, chills, diaphoresis and fever.   HENT:  Negative for congestion, ear pain, nosebleeds, sore throat, trouble swallowing and voice change.    Eyes:  Negative for pain, discharge and redness.   Respiratory:  Negative for apnea, cough, choking, shortness of breath, wheezing and stridor.    Cardiovascular:  Negative for chest pain and palpitations.   Gastrointestinal:  Negative for abdominal distention, abdominal pain, constipation, diarrhea, nausea and vomiting.   Endocrine: Negative for polydipsia.   Genitourinary:  Negative for difficulty urinating, dysuria, flank pain, frequency, hematuria and " urgency.   Musculoskeletal:  Negative for back pain, gait problem, joint swelling, myalgias, neck pain and neck stiffness.   Skin:  Negative for pallor and rash.   Neurological:  Positive for seizures. Negative for dizziness, tremors, syncope, speech difficulty, weakness, numbness and headaches.   Hematological:  Negative for adenopathy.   Psychiatric/Behavioral:  Negative for confusion, hallucinations, self-injury and suicidal ideas. The patient is not nervous/anxious.            Objective       ED Triage Vitals   Temperature Pulse Blood Pressure Respirations SpO2 Patient Position - Orthostatic VS   05/22/25 1648 05/22/25 1648 05/22/25 1650 05/22/25 1648 05/22/25 1648 05/22/25 1745   97.7 °F (36.5 °C) 94 135/88 18 95 % Sitting      Temp src Heart Rate Source BP Location FiO2 (%) Pain Score    -- 05/22/25 1745 05/22/25 1745 -- --     Monitor Right arm        Vitals      Date and Time Temp Pulse SpO2 Resp BP Pain Score FACES Pain Rating User   05/22/25 2002 -- 82 97 % 14 117/67 -- -- KD   05/22/25 1935 -- 88 98 % 16 134/97 -- -- KD   05/22/25 1900 -- 89 98 % 16 120/56 -- -- MB   05/22/25 1830 -- 85 97 % 18 120/70 -- -- MB   05/22/25 1819 -- 89 98 % 16 122/74 -- -- CG   05/22/25 1800 -- 89 98 % 18 118/74 -- -- MB   05/22/25 1745 -- 83 97 % 18 121/80 -- -- MB   05/22/25 1650 -- -- -- -- 135/88 -- -- JOHN   05/22/25 1648 97.7 °F (36.5 °C) 94 95 % 18 -- -- -- JOHN            Physical Exam  Vitals and nursing note reviewed.   Constitutional:       General: He is not in acute distress.     Appearance: He is well-developed. He is not diaphoretic.   HENT:      Head: Normocephalic and atraumatic.      Right Ear: External ear normal.      Left Ear: External ear normal.      Nose: Nose normal.     Eyes:      Conjunctiva/sclera: Conjunctivae normal.      Pupils: Pupils are equal, round, and reactive to light.       Cardiovascular:      Rate and Rhythm: Normal rate and regular rhythm.      Heart sounds: Normal heart sounds.    Pulmonary:      Effort: Pulmonary effort is normal.      Breath sounds: Normal breath sounds.   Abdominal:      General: Bowel sounds are normal.      Palpations: Abdomen is soft.      Tenderness: There is no abdominal tenderness.     Musculoskeletal:         General: Normal range of motion.      Cervical back: Normal range of motion and neck supple.     Skin:     General: Skin is warm and dry.     Neurological:      Mental Status: He is alert and oriented to person, place, and time.      Deep Tendon Reflexes: Reflexes are normal and symmetric.     Psychiatric:         Behavior: Behavior is cooperative.         Results Reviewed       Procedure Component Value Units Date/Time    Valproic acid level, total [[id number]]  (Abnormal) Collected: 05/22/25 1723    Lab Status: Final result Specimen: Blood from Arm, Left Updated: 05/22/25 1809     Valproic Acid, Total <10 ug/mL     Comprehensive metabolic panel [[id number]]  (Abnormal) Collected: 05/22/25 1723    Lab Status: Final result Specimen: Blood from Arm, Left Updated: 05/22/25 1757     Sodium 138 mmol/L      Potassium 4.1 mmol/L      Chloride 104 mmol/L      CO2 24 mmol/L      ANION GAP 10 mmol/L      BUN 13 mg/dL      Creatinine 0.83 mg/dL      Glucose 126 mg/dL      Calcium 9.0 mg/dL      AST 30 U/L      ALT 39 U/L      Alkaline Phosphatase 114 U/L      Total Protein 6.9 g/dL      Albumin 4.1 g/dL      Total Bilirubin 0.47 mg/dL      eGFR 104 ml/min/1.73sq m     Narrative:      [employer] guidelines for Chronic Kidney Disease (CKD):     Stage 1 with normal or high GFR (GFR > 90 mL/min/1.73 square meters)    Stage 2 Mild CKD (GFR = 60-89 mL/min/1.73 square meters)    Stage 3A Moderate CKD (GFR = 45-59 mL/min/1.73 square meters)    Stage 3B Moderate CKD (GFR = 30-44 mL/min/1.73 square meters)    Stage 4 Severe CKD (GFR = 15-29 mL/min/1.73 square meters)    Stage 5 End Stage CKD (GFR <15 mL/min/1.73 square meters)  Note: GFR calculation is  accurate only with a steady state creatinine    CBC and differential [[id number]] Collected: 05/22/25 1723    Lab Status: Final result Specimen: Blood from Arm, Left Updated: 05/22/25 1730     WBC 8.01 Thousand/uL      RBC 4.59 Million/uL      Hemoglobin 14.0 g/dL      Hematocrit 41.6 %      MCV 91 fL      MCH 30.5 pg      MCHC 33.7 g/dL      RDW 12.3 %      MPV 10.3 fL      Platelets 237 Thousands/uL      nRBC 0 /100 WBCs      Segmented % 59 %      Immature Grans % 0 %      Lymphocytes % 31 %      Monocytes % 7 %      Eosinophils Relative 2 %      Basophils Relative 1 %      Absolute Neutrophils 4.78 Thousands/µL      Absolute Immature Grans 0.03 Thousand/uL      Absolute Lymphocytes 2.47 Thousands/µL      Absolute Monocytes 0.55 Thousand/µL      Eosinophils Absolute 0.14 Thousand/µL      Basophils Absolute 0.04 Thousands/µL     Rapid drug screen, urine [[id number]]     Lab Status: No result Specimen: Urine     UA (URINE) with reflex to Scope [[id number]]     Lab Status: No result Specimen: Urine             XR chest 1 view portable    (Results Pending)       Procedures    ED Medication and Procedure Management   Prior to Admission Medications   Prescriptions Last Dose Informant Patient Reported? Taking?   acetaminophen (TYLENOL) 325 mg tablet Unknown  Yes No   Sig: Take 650 mg by mouth every 6 (six) hours as needed for mild pain   citalopram (CeleXA) 40 mg tablet Not Taking  Yes No   Sig: Take 40 mg by mouth in the morning.   Patient not taking: Reported on 5/22/2025   levETIRAcetam (Keppra) 500 mg tablet Not Taking  No No   Sig: Take 1 tablet (500 mg total) by mouth daily for 7 days, THEN 1 tablet (500 mg total) 2 (two) times a day for 21 days.   Patient not taking: Reported on 5/22/2025      Facility-Administered Medications: None     Patient's Medications   Discharge Prescriptions    CLONAZEPAM (KLONOPIN) 0.5 MG TABLET    Take 1 tablet (0.5 mg total) by mouth 2 (two) times a day for 10 days       Start Date:  5/22/2025 End Date: 6/1/2025       Order Dose: 0.5 mg       Quantity: 20 tablet    Refills: 0    DIVALPROEX SODIUM (DEPAKOTE ER) 250 MG 24 HR TABLET    Take 1 tablet (250 mg total) by mouth daily for 10 days       Start Date: 5/22/2025 End Date: 6/1/2025       Order Dose: 250 mg       Quantity: 10 tablet    Refills: 0     No discharge procedures on file.  ED SEPSIS DOCUMENTATION   Time reflects when diagnosis was documented in both MDM as applicable and the Disposition within this note       Time User Action Codes Description Comment    5/22/2025  8:58 PM Jonathan Elizondo Add [G40.919] Breakthrough seizure (HCC)                      [1]   Past Medical History:  Diagnosis Date    Low back pain with sciatica     Seizure (HCC)     Vertigo    [2]   Past Surgical History:  Procedure Laterality Date    ESOPHAGOGASTRODUODENOSCOPY N/A     KNEE ARTHROSCOPY Right     [place] BX/EXC LYMPH NODE OPEN SUPERFICIAL Left 1/24/2017    Procedure: INGUINAL LYMPH NODE BIOPSY WITH FLOW CYTOMETRY;  Surgeon: Steffen Munguia DO;  Location: [facility];  Service: General   [3] No family history on file.  [4]   Social History  Tobacco Use    Smoking status: Some Days     Current packs/day: 1.00     Average packs/day: 1 pack/day for 25.0 years (25.0 ttl pk-yrs)     Types: Cigarettes    Smokeless tobacco: Current    Tobacco comments:     Vape   Vaping Use    Vaping status: Former    Substances: Nicotine   Substance Use Topics    Alcohol use: Not Currently     Comment: rarely    Drug use: Not Currently     Types: Marijuana        Jonathan Elizondo DO  05/22/25 3825     mouth daily for 10 days, Starting Thu 5/22/2025, Until Sun 6/1/2025, Normal           CONTINUE these medications which have NOT CHANGED    Details   acetaminophen (TYLENOL) 325 mg tablet Take 650 mg by mouth every 6 (six) hours as needed for mild pain, Historical Med      citalopram (CeleXA) 40 mg tablet Take 40 mg by mouth in the morning., Historical Med      levETIRAcetam (Keppra) 500 mg tablet Multiple Dosages:Starting Sun 7/21/2024, Until Sat 7/27/2024 at 2359, THEN Starting Sun 7/28/2024, Until Sat 8/17/2024 at 2359Take 1 tablet (500 mg total) by mouth daily for 7 days, THEN 1 tablet (500 mg total) 2 (two) times a day for 21 days., Normal           No discharge procedures on file.  ED SEPSIS DOCUMENTATION   Time reflects when diagnosis was documented in both MDM as applicable and the Disposition within this note       Time User Action Codes Description Comment    5/22/2025  8:58 PM Jonathan Elizondo [G40.919] Breakthrough seizure (HCC)                    Jonathan Elizondo DO  05/22/25 2103         [1]   Past Medical History:  Diagnosis Date    Low back pain with sciatica     Seizure (HCC)     Vertigo    [2]   Past Surgical History:  Procedure Laterality Date    ESOPHAGOGASTRODUODENOSCOPY N/A     KNEE ARTHROSCOPY Right     [place] BX/EXC LYMPH NODE OPEN SUPERFICIAL Left 1/24/2017    Procedure: INGUINAL LYMPH NODE BIOPSY WITH FLOW CYTOMETRY;  Surgeon: Steffen Munguia DO;  Location: [facility];  Service: General   [3] No family history on file.  [4]   Social History  Tobacco Use    Smoking status: Some Days     Current packs/day: 1.00     Average packs/day: 1 pack/day for 25.0 years (25.0 ttl pk-yrs)     Types: Cigarettes    Smokeless tobacco: Current    Tobacco comments:     Vape   Vaping Use    Vaping status: Former    Substances: Nicotine   Substance Use Topics    Alcohol use: Not Currently     Comment: rarely    Drug use: Not Currently     Types: Marijuana        Jonathan Elizondo DO  05/26/25 1244

## 2025-05-24 ENCOUNTER — HOSPITAL ENCOUNTER (EMERGENCY)
Facility: HOSPITAL | Age: 50
Discharge: HOME | End: 2025-05-24
Attending: EMERGENCY MEDICINE

## 2025-05-24 VITALS
OXYGEN SATURATION: 100 % | RESPIRATION RATE: 20 BRPM | SYSTOLIC BLOOD PRESSURE: 153 MMHG | DIASTOLIC BLOOD PRESSURE: 86 MMHG | TEMPERATURE: 97.5 F | HEART RATE: 96 BPM

## 2025-05-24 DIAGNOSIS — Z79.899 ENCOUNTER FOR MEDICATION MANAGEMENT: Primary | ICD-10-CM

## 2025-05-24 DIAGNOSIS — Z59.82 TRANSPORTATION INSECURITY: ICD-10-CM

## 2025-05-24 PROCEDURE — 99284 EMERGENCY DEPT VISIT MOD MDM: CPT | Performed by: PHYSICIAN ASSISTANT

## 2025-05-24 PROCEDURE — 99281 EMR DPT VST MAYX REQ PHY/QHP: CPT

## 2025-05-24 RX ORDER — DIVALPROEX SODIUM 250 MG/1
250 TABLET, FILM COATED, EXTENDED RELEASE ORAL DAILY
Status: DISCONTINUED | OUTPATIENT
Start: 2025-05-24 | End: 2025-05-24 | Stop reason: HOSPADM

## 2025-05-24 RX ORDER — BUPRENORPHINE 2 MG/1
10 TABLET SUBLINGUAL DAILY
Status: DISCONTINUED | OUTPATIENT
Start: 2025-05-24 | End: 2025-05-24 | Stop reason: HOSPADM

## 2025-05-24 RX ADMIN — DIVALPROEX SODIUM 250 MG: 250 TABLET, EXTENDED RELEASE ORAL at 13:23

## 2025-05-24 RX ADMIN — BUPRENORPHINE 10 MG: 2 TABLET SUBLINGUAL at 13:20

## 2025-05-24 SDOH — ECONOMIC STABILITY - TRANSPORTATION SECURITY: TRANSPORTATION INSECURITY: Z59.82

## 2025-05-24 NOTE — DISCHARGE INSTRUCTIONS
Please return to the emergency department for worsening symptoms including chest pain, shortness of breath, dizziness, lightheadedness, fever greater than 103, severe pain, inability to walk, fainting episodes, etc..  Please follow-up with your family practice provider as soon as possible.  Your physician already sent over the Klonopin, Depakote, and Suboxone.  You may go to the pharmacy to pick these up.

## 2025-05-25 ENCOUNTER — HOSPITAL ENCOUNTER (EMERGENCY)
Facility: HOSPITAL | Age: 50
Discharge: HOME | End: 2025-05-25
Attending: INTERNAL MEDICINE | Admitting: INTERNAL MEDICINE
Payer: COMMERCIAL

## 2025-05-25 VITALS
TEMPERATURE: 97.8 F | DIASTOLIC BLOOD PRESSURE: 75 MMHG | RESPIRATION RATE: 18 BRPM | HEART RATE: 75 BPM | OXYGEN SATURATION: 98 % | SYSTOLIC BLOOD PRESSURE: 108 MMHG

## 2025-05-25 DIAGNOSIS — Z79.899 ENCOUNTER FOR MEDICATION MANAGEMENT: Primary | ICD-10-CM

## 2025-05-25 DIAGNOSIS — Z75.8 DIFFICULTY OBTAINING MEDICATION: ICD-10-CM

## 2025-05-25 PROCEDURE — 99284 EMERGENCY DEPT VISIT MOD MDM: CPT | Performed by: PHYSICIAN ASSISTANT

## 2025-05-25 PROCEDURE — 99281 EMR DPT VST MAYX REQ PHY/QHP: CPT

## 2025-05-25 RX ORDER — DIVALPROEX SODIUM 250 MG/1
250 TABLET, FILM COATED, EXTENDED RELEASE ORAL ONCE
Status: COMPLETED | OUTPATIENT
Start: 2025-05-25 | End: 2025-05-25

## 2025-05-25 RX ORDER — BUPRENORPHINE 2 MG/1
10 TABLET SUBLINGUAL ONCE
Status: COMPLETED | OUTPATIENT
Start: 2025-05-25 | End: 2025-05-25

## 2025-05-25 RX ORDER — CLONAZEPAM 0.5 MG/1
0.5 TABLET ORAL ONCE
Status: COMPLETED | OUTPATIENT
Start: 2025-05-25 | End: 2025-05-25

## 2025-05-25 RX ADMIN — DIVALPROEX SODIUM 250 MG: 250 TABLET, EXTENDED RELEASE ORAL at 12:04

## 2025-05-25 RX ADMIN — CLONAZEPAM 0.5 MG: 0.5 TABLET ORAL at 12:04

## 2025-05-25 RX ADMIN — BUPRENORPHINE 10 MG: 2 TABLET SUBLINGUAL at 12:04

## 2025-05-25 NOTE — ED PROVIDER NOTES
Time reflects when diagnosis was documented in both MDM as applicable and the Disposition within this note       Time User Action Codes Description Comment    5/24/2025  1:19 PM Cruz Delaney Add [Z79.899] Encounter for medication management     5/25/2025 12:58 PM Cruz Delaney Add [Z59.82] Transportation insecurity           ED Disposition       ED Disposition   Discharge    Condition   Stable    Date/Time   Sat May 24, 2025  1:19 PM    Comment   Edd Sandersleydi discharge to home/self care.                   Assessment & Plan       Medical Decision Making  49-year-old male presenting to the emergency department today for doses of his medication.  He notes he is having difficulty obtaining the secondary to transportation issues getting to the pharmacy.  His vitals are stable.  He is afebrile.  He request Suboxone, Depakote, and Klonopin.  PDMP was reviewed by me.  It appears that the Suboxone was recently distributed by [facility] in [facility].  I called this pharmacy and spoke with one of the pharmacy technicians who notes that it was sent to the care home.  Patient does note he was recently imprisoned but does not have the Suboxone as he was not sent home with these.  I also called the [facility] on [facility] who have the Depakote and Klonopin prescription ready for him available for pickup.  They also have a Suboxone prescription sent to him by Dr. Smith.  The patient was dosed with Suboxone and Depakote while here in the emergency department.  I confirmed these dosing with pharmacy technicians at [facility] pharmacy and [facility].  He is stable for discharge at this time.  Go obtain medications.  Coupon cards given while here.  Return to the emergency department for worsening symptoms.  Strict return precautions were given.  Recommend PCP follow-up as soon as possible. The patient and/or patient's proxy verify their understanding and agree to the plan at this time.  All  "questions answered to the patient and/or their proxy's satisfaction.  All labs reviewed and utilized in the medical decision making process (if labs were ordered).  Portions of the record may have been created with voice recognition software.  Occasional wrong word or \"sound a like\" substitutions may have occurred due to the inherent limitations of voice recognition software.  Read the chart carefully and recognize, using context, where substitutions have occurred.    I reviewed prior notes.    Problems Addressed:  Encounter for medication management: undiagnosed new problem with uncertain prognosis  Transportation insecurity: chronic illness or injury    Amount and/or Complexity of Data Reviewed  External Data Reviewed: notes.  Discussion of management or test interpretation with external provider(s): [facility] - Pharmacy Technician  [facility] Technician    Risk  Diagnosis or treatment significantly limited by social determinants of health.        ED Course as of 05/25/25 1259   Sat May 24, 2025   1319 I was able to contact [facility] where it suggests that patient had received Suboxone from on May 20, 2025.  They note that it was sent to the longterm.  The patient notes he was recently in longterm but was not given these for discharge.  I called the [facility] on [facility] where they already have Suboxone, Klonopin, and Depakote scripts for the patient.  I will dose the patient with his medications here and plan on discharge.       Medications - No data to display    ED Risk Strat Scores                    No data recorded                            History of Present Illness       Chief Complaint   Patient presents with    Medication Refill     Patient arrives to the Ed with complaint of sweats, elevated anxiety, restlessness. Patient request to have a dose of Depakote, suboxone and alprazolam. Patient states that he is suppose to take them at home but has not been able to refill meds due to " insurance.        Past Medical History[1]   Past Surgical History[2]   Family History[3]   Social History[4]   E-Cigarette/Vaping    E-Cigarette Use Former User       E-Cigarette/Vaping Substances    Nicotine Yes       I have reviewed and agree with the history as documented.     This is a 49-year-old male presenting to the emergency department today for management of some of his chronic outpatient medications.  The patient notes he is having difficulty obtaining his medications secondary to transportation issues to the pharmacy.  He is requesting Suboxone, Klonopin, and Depakote.  The patient notes that he believes these medications were already sent over to the [facility] on [address] in [place], [place].  He denies any suicidal or homicidal ideations.  He denies any chest pain or shortness of breath.  The patient denies other complaints at this time.      History provided by:  Patient   used: No    Medication Refill  Medications/supplies requested:  Suboxone, Depakote, Klonopin  Reason for refill: financial issues.  Medications taken before: yes - see home medications    Patient has complete original prescription information: yes        Review of Systems   Constitutional:  Negative for appetite change, chills, diaphoresis, fatigue and fever.   Eyes:  Negative for visual disturbance.   Respiratory:  Negative for cough, chest tightness, shortness of breath and wheezing.    Cardiovascular:  Negative for chest pain, palpitations and leg swelling.   Gastrointestinal:  Negative for abdominal pain, constipation, diarrhea, nausea and vomiting.   Musculoskeletal:  Negative for neck pain and neck stiffness.   Skin:  Negative for rash and wound.   Neurological:  Negative for dizziness, seizures, syncope, weakness, light-headedness, numbness and headaches.   Psychiatric/Behavioral:  Negative for confusion.    All other systems reviewed and are negative.          Objective       ED Triage Vitals    Temperature Pulse Blood Pressure Respirations SpO2 Patient Position - Orthostatic VS   05/24/25 1245 05/24/25 1245 05/24/25 1245 05/24/25 1245 05/24/25 1245 05/24/25 1245   97.5 °F (36.4 °C) 96 153/86 20 100 % Lying      Temp Source Heart Rate Source BP Location FiO2 (%) Pain Score    05/24/25 1245 05/24/25 1245 05/24/25 1245 -- 05/24/25 1320    Oral Monitor Left arm  Med Not Given for Pain - for MAR use only      Vitals      Date and Time Temp Pulse SpO2 Resp BP Pain Score FACES Pain Rating User   05/24/25 1320 -- -- -- -- -- Med Not Given for Pain - for MAR use only --    05/24/25 1245 97.5 °F (36.4 °C) 96 100 % 20 153/86 -- -- JOHN          Physical Examination:  Constitutional: Vital signs reviewed, patient well-appearing, nontoxic  Eyes: Extraocular movements intact   HEENT: Trachea midline, no JVD, moist mucous membranes   Respiratory: Equal chest expansion   Cardiovascular: Well perfused   Abdomen: No distension   Extremities: No edema   Neuro: awake, alert, oriented, no focal weakness   Skin: warm, dry, intact, no rashes noted     Results Reviewed       None            No orders to display       Procedures    ED Medication and Procedure Management   Prior to Admission Medications   Prescriptions Last Dose Informant Patient Reported? Taking?   acetaminophen (TYLENOL) 325 mg tablet   Yes No   Sig: Take 650 mg by mouth every 6 (six) hours as needed for mild pain   citalopram (CeleXA) 40 mg tablet   Yes No   Sig: Take 40 mg by mouth in the morning.   Patient not taking: Reported on 5/22/2025   clonazePAM (KlonoPIN) 0.5 mg tablet Past Week  No Yes   Sig: Take 1 tablet (0.5 mg total) by mouth 2 (two) times a day for 10 days   divalproex sodium (Depakote ER) 250 mg 24 hr tablet Past Week  No Yes   Sig: Take 1 tablet (250 mg total) by mouth daily for 10 days   levETIRAcetam (Keppra) 500 mg tablet   No No   Sig: Take 1 tablet (500 mg total) by mouth daily for 7 days, THEN 1 tablet (500 mg total) 2 (two) times a day  for 21 days.   Patient not taking: Reported on 5/22/2025      Facility-Administered Medications: None     Discharge Medication List as of 5/24/2025  1:19 PM        CONTINUE these medications which have NOT CHANGED    Details   clonazePAM (KlonoPIN) 0.5 mg tablet Take 1 tablet (0.5 mg total) by mouth 2 (two) times a day for 10 days, Starting Thu 5/22/2025, Until Sun 6/1/2025, Normal      divalproex sodium (Depakote ER) 250 mg 24 hr tablet Take 1 tablet (250 mg total) by mouth daily for 10 days, Starting Thu 5/22/2025, Until Sun 6/1/2025, Normal      acetaminophen (TYLENOL) 325 mg tablet Take 650 mg by mouth every 6 (six) hours as needed for mild pain, Historical Med      citalopram (CeleXA) 40 mg tablet Take 40 mg by mouth in the morning., Historical Med      levETIRAcetam (Keppra) 500 mg tablet Multiple Dosages:Starting Sun 7/21/2024, Until Sat 7/27/2024 at 2359, THEN Starting Sun 7/28/2024, Until Sat 8/17/2024 at 2359Take 1 tablet (500 mg total) by mouth daily for 7 days, THEN 1 tablet (500 mg total) 2 (two) times a day for 21 days., Normal           No discharge procedures on file.  ED SEPSIS DOCUMENTATION   Time reflects when diagnosis was documented in both MDM as applicable and the Disposition within this note       Time User Action Codes Description Comment    5/24/2025  1:19 PM Cruz Delaney [Z79.899] Encounter for medication management     5/25/2025 12:58 PM Cruz Delaney [Z59.82] Transportation insecurity                    [1]   Past Medical History:  Diagnosis Date    Low back pain with sciatica     Seizure (HCC)     Vertigo    [2]   Past Surgical History:  Procedure Laterality Date    ESOPHAGOGASTRODUODENOSCOPY N/A     KNEE ARTHROSCOPY Right     AR BX/EXC LYMPH NODE OPEN SUPERFICIAL Left 1/24/2017    Procedure: INGUINAL LYMPH NODE BIOPSY WITH FLOW CYTOMETRY;  Surgeon: Steffen Munguia DO;  Location: [facility];  Service: General   [3] No family history on file.  [4]   Social  History  Tobacco Use    Smoking status: Some Days     Current packs/day: 1.00     Average packs/day: 1 pack/day for 25.0 years (25.0 ttl pk-yrs)     Types: Cigarettes    Smokeless tobacco: Current    Tobacco comments:     Vape   Vaping Use    Vaping status: Former    Substances: Nicotine   Substance Use Topics    Alcohol use: Not Currently     Comment: rarely    Drug use: Not Currently     Types: Marijuana        Cruz Delaney PA-C  05/25/25 125

## 2025-05-25 NOTE — DISCHARGE INSTRUCTIONS
Please return to the emergency department for worsening symptoms including chest pain, shortness of breath, dizziness, lightheadedness, fever greater than 103, severe pain, inability to walk, fainting episodes, etc..  Please follow-up with your family practice provider as soon as possible.  Obtain the medication from the pharmacy you are already sent the medication to.

## 2025-05-25 NOTE — ED NOTES
Discharge instructions reviewed with pt. Pt verbalized understanding. And has no further questions at this time. Pt ambulatory off unit with steady gait.      Deya Young RN  05/25/25 8251

## 2025-05-25 NOTE — ED PROVIDER NOTES
"Time reflects when diagnosis was documented in both MDM as applicable and the Disposition within this note       Time User Action Codes Description Comment    5/25/2025 11:44 AM Cruz Delaney [Z79.899] Encounter for medication management     5/25/2025 11:44 AM Cruz Delaney [Z75.8] Difficulty obtaining medication           ED Disposition       ED Disposition   Discharge    Condition   Stable    Date/Time   Sun May 25, 2025 11:44 AM    Comment   Edd Quezada discharge to home/self care.                   Assessment & Plan       Medical Decision Making  49-year-old male presenting to the emergency department today for a dose of his Suboxone, Depakote, and Klonopin.  Patient already had these medications filled over at his pharmacy.  He notes he is having financial difficulties obtaining this medication and he will be getting \"help\" from his brother tomorrow for this.  Vitals are stable.  Afebrile.  Differential diagnosis includes but is not limited to medical noncompliance, malingering, encounter for medication management, etc.  Patient dosed with these medications while here in the emergency department.  I reviewed PDMP.  He is stable for discharge at this time.  Obtain medications from your pharmacy.  Strict return precautions were given.  Recommend PCP follow-up as soon as possible. The patient and/or patient's proxy verify their understanding and agree to the plan at this time.  All questions answered to the patient and/or their proxy's satisfaction.  All labs reviewed and utilized in the medical decision making process (if labs were ordered).  Portions of the record may have been created with voice recognition software.  Occasional wrong word or \"sound a like\" substitutions may have occurred due to the inherent limitations of voice recognition software.  Read the chart carefully and recognize, using context, where substitutions have occurred.    I reviewed prior notes.    Problems " Addressed:  Difficulty obtaining medication: chronic illness or injury with exacerbation, progression, or side effects of treatment  Encounter for medication management: acute illness or injury    Amount and/or Complexity of Data Reviewed  External Data Reviewed: notes.    Risk  Prescription drug management.             Medications   buprenorphine (SUBUTEX) 2 mg SL tablet 10 mg (10 mg Sublingual Given 5/25/25 1204)   divalproex sodium (DEPAKOTE ER) 24 hr tablet 250 mg (250 mg Oral Given 5/25/25 1204)   clonazePAM (KlonoPIN) tablet 0.5 mg (0.5 mg Oral Given 5/25/25 1204)       ED Risk Strat Scores                    No data recorded        SBIRT 20yo+      Flowsheet Row Most Recent Value   Initial Alcohol Screen: US AUDIT-C     1. How often do you have a drink containing alcohol? 0 Filed at: 05/25/2025 1137   2. How many drinks containing alcohol do you have on a typical day you are drinking?  0 Filed at: 05/25/2025 1137   3a. Male UNDER 65: How often do you have five or more drinks on one occasion? 0 Filed at: 05/25/2025 1137   3b. FEMALE Any Age, or MALE 65+: How often do you have 4 or more drinks on one occassion? 0 Filed at: 05/25/2025 1137   Audit-C Score 0 Filed at: 05/25/2025 1132   FINA: How many times in the past year have you...    Used an illegal drug or used a prescription medication for non-medical reasons? Never Filed at: 05/25/2025 1135                            History of Present Illness       Chief Complaint   Patient presents with    Medication Refill     Pt presents to the ed after have troubles getting suboxone, clonpine and depokete       Past Medical History[1]   Past Surgical History[2]   Family History[3]   Social History[4]   E-Cigarette/Vaping    E-Cigarette Use Former User       E-Cigarette/Vaping Substances    Nicotine Yes       I have reviewed and agree with the history as documented.     49-year-old male presenting to the emergency department once again for difficulty obtaining his  "medications.  The patient notes he was able to secure transportation to the pharmacy but notes that when he presented them the coupon cards I gave him yesterday that they were still too expensive.  The patient notes that his brother is going to come in from out of town tomorrow to \"help him out\".  He is requesting a dose once again of his Suboxone, Depakote, and Klonopin.  He has no suicidal or homicidal ideations.  He has no chest pain or shortness of breath.  He has no other complaints at this time.      History provided by:  Patient   used: No    Medication Refill  Medications/supplies requested:  Suboxone, Klonopin, Depakote  Reason for request:  Medications not available  Medications taken before: yes - see home medications    Patient has complete original prescription information: yes        Review of Systems   Constitutional:  Negative for appetite change, chills, diaphoresis, fatigue and fever.   Eyes:  Negative for visual disturbance.   Respiratory:  Negative for cough, chest tightness, shortness of breath and wheezing.    Cardiovascular:  Negative for chest pain, palpitations and leg swelling.   Gastrointestinal:  Negative for abdominal pain, constipation, diarrhea, nausea and vomiting.   Musculoskeletal:  Negative for neck pain and neck stiffness.   Skin:  Negative for rash and wound.   Neurological:  Negative for dizziness, seizures, syncope, weakness, light-headedness, numbness and headaches.   Psychiatric/Behavioral:  Negative for confusion.    All other systems reviewed and are negative.          Objective       ED Triage Vitals   Temperature Pulse Blood Pressure Respirations SpO2 Patient Position - Orthostatic VS   05/25/25 1137 05/25/25 1137 05/25/25 1137 05/25/25 1137 05/25/25 1137 05/25/25 1137   97.8 °F (36.6 °C) 75 108/75 18 98 % Lying      Temp Source Heart Rate Source BP Location FiO2 (%) Pain Score    05/25/25 1137 05/25/25 1137 05/25/25 1137 -- 05/25/25 1204    Oral " Monitor Left arm  Med Not Given for Pain - for MAR use only      Vitals      Date and Time Temp Pulse SpO2 Resp BP Pain Score FACES Pain Rating User   05/25/25 1204 -- -- -- -- -- Med Not Given for Pain - for MAR use only -- SD   05/25/25 1137 97.8 °F (36.6 °C) 75 98 % 18 108/75 -- -- SD          Physical Examination:  Constitutional: Vital signs reviewed, patient well-appearing, nontoxic  Eyes: Extraocular movements intact   HEENT: Trachea midline, no JVD, moist mucous membranes   Respiratory: Equal chest expansion   Cardiovascular: Well perfused   Abdomen: No distension   Extremities: No edema   Neuro: awake, alert, oriented, no focal weakness   Skin: warm, dry, intact, no rashes noted     Results Reviewed       None            No orders to display       Procedures    ED Medication and Procedure Management   Prior to Admission Medications   Prescriptions Last Dose Informant Patient Reported? Taking?   acetaminophen (TYLENOL) 325 mg tablet   Yes No   Sig: Take 650 mg by mouth every 6 (six) hours as needed for mild pain   citalopram (CeleXA) 40 mg tablet   Yes No   Sig: Take 40 mg by mouth in the morning.   Patient not taking: Reported on 5/22/2025   clonazePAM (KlonoPIN) 0.5 mg tablet   No No   Sig: Take 1 tablet (0.5 mg total) by mouth 2 (two) times a day for 10 days   divalproex sodium (Depakote ER) 250 mg 24 hr tablet   No No   Sig: Take 1 tablet (250 mg total) by mouth daily for 10 days   levETIRAcetam (Keppra) 500 mg tablet   No No   Sig: Take 1 tablet (500 mg total) by mouth daily for 7 days, THEN 1 tablet (500 mg total) 2 (two) times a day for 21 days.   Patient not taking: Reported on 5/22/2025      Facility-Administered Medications: None     Discharge Medication List as of 5/25/2025 11:44 AM        CONTINUE these medications which have NOT CHANGED    Details   acetaminophen (TYLENOL) 325 mg tablet Take 650 mg by mouth every 6 (six) hours as needed for mild pain, Historical Med      citalopram (CeleXA) 40  mg tablet Take 40 mg by mouth in the morning., Historical Med      clonazePAM (KlonoPIN) 0.5 mg tablet Take 1 tablet (0.5 mg total) by mouth 2 (two) times a day for 10 days, Starting Thu 5/22/2025, Until Sun 6/1/2025, Normal      divalproex sodium (Depakote ER) 250 mg 24 hr tablet Take 1 tablet (250 mg total) by mouth daily for 10 days, Starting Thu 5/22/2025, Until Sun 6/1/2025, Normal      levETIRAcetam (Keppra) 500 mg tablet Multiple Dosages:Starting Sun 7/21/2024, Until Sat 7/27/2024 at 2359, THEN Starting Sun 7/28/2024, Until Sat 8/17/2024 at 2359Take 1 tablet (500 mg total) by mouth daily for 7 days, THEN 1 tablet (500 mg total) 2 (two) times a day for 21 days., Normal           No discharge procedures on file.  ED SEPSIS DOCUMENTATION   Time reflects when diagnosis was documented in both MDM as applicable and the Disposition within this note       Time User Action Codes Description Comment    5/25/2025 11:44 AM Cruz Delaney [Z79.899] Encounter for medication management     5/25/2025 11:44 AM Cruz Delaney [Z75.8] Difficulty obtaining medication                      [1]   Past Medical History:  Diagnosis Date    Low back pain with sciatica     Seizure (HCC)     Vertigo    [2]   Past Surgical History:  Procedure Laterality Date    ESOPHAGOGASTRODUODENOSCOPY N/A     KNEE ARTHROSCOPY Right     ID BX/EXC LYMPH NODE OPEN SUPERFICIAL Left 1/24/2017    Procedure: INGUINAL LYMPH NODE BIOPSY WITH FLOW CYTOMETRY;  Surgeon: Steffen Munguia DO;  Location: [facility];  Service: General   [3] No family history on file.  [4]   Social History  Tobacco Use    Smoking status: Some Days     Current packs/day: 1.00     Average packs/day: 1 pack/day for 25.0 years (25.0 ttl pk-yrs)     Types: Cigarettes    Smokeless tobacco: Current    Tobacco comments:     Vape   Vaping Use    Vaping status: Former    Substances: Nicotine   Substance Use Topics    Alcohol use: Not Currently     Comment: rarely    Drug  use: Not Currently     Types: Marijuana        Cruz Delaney PA-C  05/25/25 1924

## 2025-06-02 ENCOUNTER — HOSPITAL ENCOUNTER (EMERGENCY)
Facility: HOSPITAL | Age: 50
Discharge: HOME | End: 2025-06-02
Attending: EMERGENCY MEDICINE

## 2025-06-02 VITALS
RESPIRATION RATE: 16 BRPM | OXYGEN SATURATION: 98 % | TEMPERATURE: 98.5 F | SYSTOLIC BLOOD PRESSURE: 135 MMHG | DIASTOLIC BLOOD PRESSURE: 70 MMHG | HEART RATE: 83 BPM

## 2025-06-02 DIAGNOSIS — Z76.0 ENCOUNTER FOR MEDICATION REFILL: Primary | ICD-10-CM

## 2025-06-02 DIAGNOSIS — Z75.8 DIFFICULTY OBTAINING MEDICATION: ICD-10-CM

## 2025-06-02 PROCEDURE — 99281 EMR DPT VST MAYX REQ PHY/QHP: CPT

## 2025-06-02 PROCEDURE — 99284 EMERGENCY DEPT VISIT MOD MDM: CPT | Performed by: EMERGENCY MEDICINE

## 2025-06-02 RX ORDER — BUPRENORPHINE 2 MG/1
2 TABLET SUBLINGUAL ONCE
Status: COMPLETED | OUTPATIENT
Start: 2025-06-02 | End: 2025-06-02

## 2025-06-02 RX ORDER — BUPRENORPHINE 2 MG/1
8 TABLET SUBLINGUAL DAILY
Status: DISCONTINUED | OUTPATIENT
Start: 2025-06-02 | End: 2025-06-02 | Stop reason: HOSPADM

## 2025-06-02 RX ADMIN — BUPRENORPHINE 2 MG: 2 TABLET SUBLINGUAL at 14:15

## 2025-06-02 RX ADMIN — BUPRENORPHINE 8 MG: 2 TABLET SUBLINGUAL at 14:37

## 2025-06-02 NOTE — DISCHARGE INSTRUCTIONS
You were evaluated in the emergency department for: medication refill. You can access your current and pending results through [employer] [employer].    - You should follow-up with your primary care provider, as soon as possible, for re-evaluation.  - Please pick your medication up from the pharmacy    Please, return to the emergency department if you experience new or worsening symptoms, fever, chest pain, shortness of breath, difficulty breathing, dizziness, abdominal pain, persistent nausea/vomiting, syncope or passing out, blood in your urine or stool, coughing up blood, leg swelling/pain, urinary retention, bowel or bladder incontinence, numbness between your legs.

## 2025-06-02 NOTE — ED PROVIDER NOTES
Time reflects when diagnosis was documented in both MDM as applicable and the Disposition within this note       Time User Action Codes Description Comment    6/2/2025  1:55 PM Treva Tabor Add [Z76.0] Encounter for medication refill     6/2/2025  2:05 PM TaborTreva sen Add [Z75.8] Difficulty obtaining medication           ED Disposition       ED Disposition   Discharge    Condition   Stable    Date/Time   Mon Jun 2, 2025  2:05 PM    Comment   Edd Quezada discharge to home/self care.                   Assessment & Plan       Medical Decision Making  Edd Quezada is a 49 y.o. male presenting for medication refill. Vitals unremarkable. Exam remarkable for mild diaphoresis, otherwise unremarkable.      DDx including but not limited to: medication refill, drug withdrawal, request for detox, substance abuse. Doubt metabolic abnormality, psychiatric disorder. Doubt infection.    See ED course for further updates and interpretation of results.    Based on these results and H&P, patient was given one dose of suboxone, dose per PDMP 10 total mg, 8 mg and 2 mg. No new symptoms after administration of medication. Discussed close follow-up with his prescribing provider for further doses. Patient reports he hopes to be able to get medication tomorrow.    Results, clinical impressions, and plan were discussed with patient. They expressed understanding and were in agreement with plan. Patient was given the opportunity to ask questions in ED. All questions and concerns were addressed in ED.    After evaluation and workup in the emergency department Patient appears well, is nontoxic appearing, expresses understanding and agrees with plan of care at this time.  In light of this patient would benefit from outpatient management. Discussed strict return precautions.  Discussed importance of following up with PCP in the next few days.  All questions answered.  Patient is agreeable to discharge.    Risk  Prescription drug  "management.        ED Course as of 06/02/25 2251   Mon Jun 02, 2025   1353 A few days   1354 Able to get his clonapin and depakote but couldn't afford suboxone       Medications   buprenorphine (SUBUTEX) 2 mg SL tablet 2 mg (2 mg Sublingual Given 6/2/25 1415)       ED Risk Strat Scores                    No data recorded                            History of Present Illness       Chief Complaint   Patient presents with    Medication Refill     Patient here for suboxone refill        Past Medical History[1]   Past Surgical History[2]   Family History[3]   Social History[4]   E-Cigarette/Vaping    E-Cigarette Use Former User       E-Cigarette/Vaping Substances    Nicotine Yes       I have reviewed and agree with the history as documented.     Edd Quezada is a 49 y.o. male with history of seizure disorder, chronic suboxone use, presenting for medication refill.    Patient reports he has been having difficulties accessing his medications due to being unable to afford the medications. Was able to  his Klonopin and Depakote to prevent seizures, but unable to afford Suboxone. Last dose was a few days ago. Having diaphoresis, feeling \"shaky,\" some mild abdominal pain, body aches. Patient currently denies fevers, dizziness, chest pain, palpitations, shortness of breath, vomiting, constipation, diarrhea, urinary frequency, dysuria, and new extremity weakness, swelling and pain. No seizures.          Review of Systems   Constitutional:  Positive for chills, diaphoresis and fatigue. Negative for fever.   HENT:  Negative for congestion, rhinorrhea and sore throat.    Eyes:  Negative for pain and visual disturbance.   Respiratory:  Negative for cough, chest tightness, shortness of breath, wheezing and stridor.    Cardiovascular:  Negative for chest pain, palpitations and leg swelling.   Gastrointestinal:  Positive for abdominal pain. Negative for constipation, diarrhea, nausea and vomiting.   Genitourinary:  Negative " for dysuria and hematuria.   Musculoskeletal:  Positive for myalgias. Negative for arthralgias and back pain.   Skin:  Negative for color change, pallor and rash.   Neurological:  Negative for dizziness, syncope, light-headedness, numbness and headaches.   Psychiatric/Behavioral:  Negative for behavioral problems.    All other systems reviewed and are negative.          Objective       ED Triage Vitals   Temperature Pulse Blood Pressure Respirations SpO2 Patient Position - Orthostatic VS   06/02/25 1338 06/02/25 1338 06/02/25 1338 06/02/25 1338 06/02/25 1338 --   98.5 °F (36.9 °C) 83 135/70 16 98 %       Temp Source Heart Rate Source BP Location FiO2 (%) Pain Score    06/02/25 1338 06/02/25 1338 06/02/25 1338 -- 06/02/25 1437    Oral Monitor Left arm  Med Not Given for Pain - for MAR use only      Vitals      Date and Time Temp Pulse SpO2 Resp BP Pain Score FACES Pain Rating User   06/02/25 1437 -- -- -- -- -- Med Not Given for Pain - for MAR use only -- KM   06/02/25 1338 98.5 °F (36.9 °C) 83 98 % 16 135/70 -- --             Physical Exam  Vitals and nursing note reviewed.   Constitutional:       General: He is not in acute distress.     Appearance: Normal appearance. He is well-developed.   HENT:      Head: Normocephalic and atraumatic.      Nose: No rhinorrhea.      Mouth/Throat:      Mouth: Mucous membranes are moist.      Pharynx: Oropharynx is clear.     Eyes:      Extraocular Movements: Extraocular movements intact.      Conjunctiva/sclera: Conjunctivae normal.      Pupils: Pupils are equal, round, and reactive to light.       Cardiovascular:      Rate and Rhythm: Normal rate and regular rhythm.      Pulses: Normal pulses.      Heart sounds: Normal heart sounds. No murmur heard.  Pulmonary:      Effort: Pulmonary effort is normal. No respiratory distress.      Breath sounds: Normal breath sounds. No wheezing, rhonchi or rales.   Chest:      Chest wall: No tenderness.   Abdominal:      General: Abdomen is  flat. Bowel sounds are normal. There is no distension.      Palpations: Abdomen is soft.      Tenderness: There is no abdominal tenderness. There is no right CVA tenderness, left CVA tenderness, guarding or rebound.     Musculoskeletal:      Cervical back: Neck supple.      Right lower leg: No edema.      Left lower leg: No edema.     Skin:     General: Skin is warm and moist.      Capillary Refill: Capillary refill takes less than 2 seconds.     Neurological:      General: No focal deficit present.      Mental Status: He is alert and oriented to person, place, and time.      Sensory: No sensory deficit.      Motor: No weakness.     Psychiatric:         Mood and Affect: Mood normal.         Behavior: Behavior normal.         Results Reviewed       None            No orders to display       Procedures    ED Medication and Procedure Management   Prior to Admission Medications   Prescriptions Last Dose Informant Patient Reported? Taking?   acetaminophen (TYLENOL) 325 mg tablet   Yes No   Sig: Take 650 mg by mouth every 6 (six) hours as needed for mild pain   citalopram (CeleXA) 40 mg tablet   Yes No   Sig: Take 40 mg by mouth in the morning.   Patient not taking: Reported on 5/22/2025   clonazePAM (KlonoPIN) 0.5 mg tablet   No No   Sig: Take 1 tablet (0.5 mg total) by mouth 2 (two) times a day for 10 days   divalproex sodium (Depakote ER) 250 mg 24 hr tablet   No No   Sig: Take 1 tablet (250 mg total) by mouth daily for 10 days   levETIRAcetam (Keppra) 500 mg tablet   No No   Sig: Take 1 tablet (500 mg total) by mouth daily for 7 days, THEN 1 tablet (500 mg total) 2 (two) times a day for 21 days.   Patient not taking: Reported on 5/22/2025      Facility-Administered Medications: None     Discharge Medication List as of 6/2/2025  2:45 PM        CONTINUE these medications which have NOT CHANGED    Details   acetaminophen (TYLENOL) 325 mg tablet Take 650 mg by mouth every 6 (six) hours as needed for mild pain, Historical  Med      citalopram (CeleXA) 40 mg tablet Take 40 mg by mouth in the morning., Historical Med      clonazePAM (KlonoPIN) 0.5 mg tablet Take 1 tablet (0.5 mg total) by mouth 2 (two) times a day for 10 days, Starting Thu 5/22/2025, Until Sun 6/1/2025, Normal      divalproex sodium (Depakote ER) 250 mg 24 hr tablet Take 1 tablet (250 mg total) by mouth daily for 10 days, Starting Thu 5/22/2025, Until Sun 6/1/2025, Normal      levETIRAcetam (Keppra) 500 mg tablet Multiple Dosages:Starting Sun 7/21/2024, Until Sat 7/27/2024 at 2359, THEN Starting Sun 7/28/2024, Until Sat 8/17/2024 at 2359Take 1 tablet (500 mg total) by mouth daily for 7 days, THEN 1 tablet (500 mg total) 2 (two) times a day for 21 days., Normal           No discharge procedures on file.  ED SEPSIS DOCUMENTATION   Time reflects when diagnosis was documented in both MDM as applicable and the Disposition within this note       Time User Action Codes Description Comment    6/2/2025  1:55 PM Treva Tabor [Z76.0] Encounter for medication refill     6/2/2025  2:05 PM Treva Tabor [Z75.8] Difficulty obtaining medication                      [1]   Past Medical History:  Diagnosis Date    Low back pain with sciatica     Seizure (HCC)     Vertigo    [2]   Past Surgical History:  Procedure Laterality Date    ESOPHAGOGASTRODUODENOSCOPY N/A     KNEE ARTHROSCOPY Right     [place] BX/EXC LYMPH NODE OPEN SUPERFICIAL Left 1/24/2017    Procedure: INGUINAL LYMPH NODE BIOPSY WITH FLOW CYTOMETRY;  Surgeon: Steffen Munguia DO;  Location: [facility];  Service: General   [3] No family history on file.  [4]   Social History  Tobacco Use    Smoking status: Some Days     Current packs/day: 1.00     Average packs/day: 1 pack/day for 25.0 years (25.0 ttl pk-yrs)     Types: Cigarettes    Smokeless tobacco: Current    Tobacco comments:     Vape   Vaping Use    Vaping status: Former    Substances: Nicotine   Substance Use Topics    Alcohol use: Not Currently     Comment:  rarely    Drug use: Not Currently     Types: Marijuana        Treva Tabor MD  06/02/25 2858

## 2025-06-11 NOTE — ED PROVIDER NOTES
Time reflects when diagnosis was documented in both MDM as applicable and the Disposition within this note       Time User Action Codes Description Comment    5/15/2025 12:39 AM Joanna Hoang Add [R45.1] Agitation     5/15/2025 12:39 AM Joanna Hoang Add [R46.89] Aggressive behavior     5/15/2025 12:39 AM Joanna Hoang Add [R25.1] Episode of shaking           ED Disposition       ED Disposition   Discharge    Condition   Stable    Date/Time   Thu May 15, 2025 12:39 AM    Comment   Eddambrose Quezada discharge to home/self care.                   Assessment & Plan       Medical Decision Making  ASSESSMENT:   Patient is a 49 y.o. male who presents with agitation, possible seizure activity, reported drug use, arrives sedated.    PLAN:   Will do serial reevaluations while patient is under observation.  1:1 for patient/others safety.  CBC for leukocytosis and anemia  CMP for LFTs/RFT/electrolytes  EtOH for possible alcohol intoxication  CK for rhabdomyolysis/damage from taser used by police  Point-of-care glucose for hypoglycemia  Cardiac monitoring for arrhythmia  Continuous pulse ox for oxygen saturation  Will trial Narcan for possible medication/substance use reversal  Patient will be discharged into police custody pending reevaluation and medical clearance     Amount and/or Complexity of Data Reviewed  Labs: ordered.    Risk  Prescription drug management.             Medications   droperidol (FOR EMS ONLY) (INAPSINE) 2.5 mg/mL injection 2.5 mg (0 mg Does not apply Given to EMS 5/14/25 2053)   naloxone (NARCAN) injection 0.4 mg (0.4 mg Intravenous Given 5/14/25 2048)       ED Risk Strat Scores                    No data recorded                            History of Present Illness       Chief Complaint   Patient presents with    Combative     EMS and police called for pt possibly using drugs in [facility] bathroom. Upon arrival, pt cooperative but during transport to ED, pt tried to jump out back of  ambulance. Pt running in traffic and was then tased by police.        Past Medical History[1]   Past Surgical History[2]   Family History[3]   Social History[4]   E-Cigarette/Vaping    E-Cigarette Use Former User       E-Cigarette/Vaping Substances    Nicotine Yes       I have reviewed and agree with the history as documented.     HPI  Patient is a 49 y.o. male with PMHx known polysubstance abuse who presents to the ED via EMS with police for evaluation of possible seizure activity, illicit drug use, and agitation. Patient was arrested by police for suspected substance use in the bathroom of a grocery store. Upon arrival to senior living facility, patient appeared to have seizure like activity and EMS was called for evaluation. Patient attempted to run away from police and EMS and was given 5mg versed and 5mg droperidol IM. Patient arrives sedated, no obvious injuries.    Review of Systems: Limited secondary to patient agitation and lack of cooperation    Objective       ED Triage Vitals [05/14/25 2023]   Temperature Pulse Blood Pressure Respirations SpO2 Patient Position - Orthostatic VS   98.6 °F (37 °C) 90 117/60 20 96 % Lying      Temp Source Heart Rate Source BP Location FiO2 (%) Pain Score    Axillary Monitor Left arm -- --      Vitals      Date and Time Temp Pulse SpO2 Resp BP Pain Score FACES Pain Rating User   05/14/25 2318 -- 101 99 % 20 125/71 -- -- ED   05/14/25 2222 -- 84 99 % 20 122/66 -- -- ED   05/14/25 2133 -- 89 99 % 20 119/66 -- -- ED   05/14/25 2059 -- 95 99 % 20 -- -- -- ED   05/14/25 2052 -- -- 98 % 20 110/56 -- -- ED   05/14/25 2023 98.6 °F (37 °C) 90 96 % 20 117/60 -- -- AR            Physical Exam: Limited due to patient agitation  General: VSS, yelling and uncooperative.  Head: Normocephalic.  Eyes: +miotic pupils. No scleral icterus.  ENT: Nose and external ears atraumatic. Normal phonation.  Neck: Trachea midline. Supple. Normal ROM.  CV: Warm and well perfused. Regular rhythm and normal  rate.  Lungs: Unlabored, no tachypnea, no accessory muscle use.  Abd: Flat, non-distended. No bruising.  MSK: Moving all extremities. No deformity/injury.  Skin: Dry, intact. No rashes or bruising.  Neuro: Uncooperative, no obvious focal deficits. Unable to assess orientation questions.    Results Reviewed       Procedure Component Value Units Date/Time    Comprehensive metabolic panel [[id number]]  (Abnormal) Collected: 05/14/25 2118    Lab Status: Final result Specimen: Blood from Arm, Right Updated: 05/14/25 2206     Sodium 138 mmol/L      Potassium 3.5 mmol/L      Chloride 105 mmol/L      CO2 21 mmol/L      ANION GAP 12 mmol/L      BUN 10 mg/dL      Creatinine 0.90 mg/dL      Glucose 85 mg/dL      Calcium 8.7 mg/dL      AST 37 U/L      ALT 45 U/L      Alkaline Phosphatase 112 U/L      Total Protein 6.4 g/dL      Albumin 3.7 g/dL      Total Bilirubin 0.43 mg/dL      eGFR 101 ml/min/1.73sq m     Narrative:      National Kidney Disease Foundation guidelines for Chronic Kidney Disease (CKD):     Stage 1 with normal or high GFR (GFR > 90 mL/min/1.73 square meters)    Stage 2 Mild CKD (GFR = 60-89 mL/min/1.73 square meters)    Stage 3A Moderate CKD (GFR = 45-59 mL/min/1.73 square meters)    Stage 3B Moderate CKD (GFR = 30-44 mL/min/1.73 square meters)    Stage 4 Severe CKD (GFR = 15-29 mL/min/1.73 square meters)    Stage 5 End Stage CKD (GFR <15 mL/min/1.73 square meters)  Note: GFR calculation is accurate only with a steady state creatinine    CK [[id number]]  (Normal) Collected: 05/14/25 2118    Lab Status: Final result Specimen: Blood from Arm, Right Updated: 05/14/25 2206     Total  U/L     Ethanol [[id number]]  (Normal) Collected: 05/14/25 2118    Lab Status: Final result Specimen: Blood from Arm, Right Updated: 05/14/25 2153     Ethanol Lvl <10 mg/dL     CBC and differential [[id number]] Collected: 05/14/25 2118    Lab Status: Final result Specimen: Blood from Arm, Right Updated: 05/14/25 2129     WBC 8.69  Thousand/uL      RBC 4.37 Million/uL      Hemoglobin 13.3 g/dL      Hematocrit 41.2 %      MCV 94 fL      MCH 30.4 pg      MCHC 32.3 g/dL      RDW 12.7 %      MPV 10.7 fL      Platelets 208 Thousands/uL      nRBC 0 /100 WBCs      Segmented % 64 %      Immature Grans % 1 %      Lymphocytes % 26 %      Monocytes % 6 %      Eosinophils Relative 2 %      Basophils Relative 1 %      Absolute Neutrophils 5.58 Thousands/µL      Absolute Immature Grans 0.08 Thousand/uL      Absolute Lymphocytes 2.25 Thousands/µL      Absolute Monocytes 0.55 Thousand/µL      Eosinophils Absolute 0.19 Thousand/µL      Basophils Absolute 0.04 Thousands/µL     Fingerstick Glucose (POCT) [[id number]]  (Normal) Collected: 05/14/25 2048    Lab Status: Final result Specimen: Blood Updated: 05/14/25 2049     POC Glucose 96 mg/dl             No orders to display       Procedures    ED Medication and Procedure Management   Prior to Admission Medications   Prescriptions Last Dose Informant Patient Reported? Taking?   acetaminophen (TYLENOL) 325 mg tablet Unknown  Yes No   Sig: Take 650 mg by mouth every 6 (six) hours as needed for mild pain   buprenorphine (SUBUTEX) 2 mg Unknown  No No   Sig: Place 1 tablet (2 mg total) under the tongue daily   buprenorphine (SUBUTEX) 8 mg Unknown  No No   Sig: Place 1 tablet (8 mg total) under the tongue daily   citalopram (CeleXA) 40 mg tablet Unknown  Yes No   Sig: Take 40 mg by mouth in the morning.   Patient not taking: Reported on 5/22/2025   levETIRAcetam (Keppra) 500 mg tablet   No No   Sig: Take 1 tablet (500 mg total) by mouth daily for 7 days, THEN 1 tablet (500 mg total) 2 (two) times a day for 21 days.   Patient not taking: Reported on 5/22/2025      Facility-Administered Medications: None     Discharge Medication List as of 5/15/2025 12:57 AM        CONTINUE these medications which have NOT CHANGED    Details   acetaminophen (TYLENOL) 325 mg tablet Take 650 mg by mouth every 6 (six) hours as needed for  Render Post-Care Instructions In Note?: no mild pain, Historical Med      !! buprenorphine (SUBUTEX) 2 mg Place 1 tablet (2 mg total) under the tongue daily, Starting Thu 4/17/2025, Until Sat 5/17/2025, Normal      !! buprenorphine (SUBUTEX) 8 mg Place 1 tablet (8 mg total) under the tongue daily, Starting Thu 4/17/2025, Until Sat 5/17/2025, Normal      citalopram (CeleXA) 40 mg tablet Take 40 mg by mouth in the morning., Historical Med      levETIRAcetam (Keppra) 500 mg tablet Multiple Dosages:Starting Sun 7/21/2024, Until Sat 7/27/2024 at 2359, THEN Starting Sun 7/28/2024, Until Sat 8/17/2024 at 2359Take 1 tablet (500 mg total) by mouth daily for 7 days, THEN 1 tablet (500 mg total) 2 (two) times a day for 21 days., Normal      calcium carbonate (TUMS) 500 mg chewable tablet Chew 2 tablets in the morning and 2 tablets in the evening., Historical Med      meclizine (ANTIVERT) 25 mg tablet Take 1 tablet (25 mg total) by mouth 3 (three) times a day as needed for dizziness, Starting Wed 1/11/2023, Normal      naproxen (NAPROSYN) 500 mg tablet Take 1 tablet (500 mg total) by mouth 2 (two) times a day as needed for mild pain, Starting Mon 2/19/2024, Normal      ondansetron (ZOFRAN) 4 mg tablet Take 1 tablet (4 mg total) by mouth every 6 (six) hours, Starting Wed 1/11/2023, Normal      pantoprazole (PROTONIX) 40 mg tablet Take 1 tablet (40 mg total) by mouth daily, Starting Tue 10/6/2020, Normal      tobramycin-dexamethasone (TOBRADEX) ophthalmic suspension Administer 1 drop to the right eye every 4 (four) hours while awake, Starting Mon 1/9/2023, Normal       !! - Potential duplicate medications found. Please discuss with provider.        No discharge procedures on file.  ED SEPSIS DOCUMENTATION   Time reflects when diagnosis was documented in both MDM as applicable and the Disposition within this note       Time User Action Codes Description Comment    5/15/2025 12:39 AM Joanna Hoang Add [R45.1] Agitation     5/15/2025 12:39 AM Joanna Hoang Add  [R46.89] Aggressive behavior     5/15/2025 12:39 AM Joanna Hoang Add [R25.1] Episode of shaking                    [1]   Past Medical History:  Diagnosis Date    Low back pain with sciatica     Seizure (HCC)     Vertigo    [2]   Past Surgical History:  Procedure Laterality Date    ESOPHAGOGASTRODUODENOSCOPY N/A     KNEE ARTHROSCOPY Right     [place] BX/EXC LYMPH NODE OPEN SUPERFICIAL Left 1/24/2017    Procedure: INGUINAL LYMPH NODE BIOPSY WITH FLOW CYTOMETRY;  Surgeon: Steffen Munguia DO;  Location: [facility];  Service: General   [3] No family history on file.  [4]   Social History  Tobacco Use    Smoking status: Some Days     Current packs/day: 1.00     Average packs/day: 1 pack/day for 25.0 years (25.0 ttl pk-yrs)     Types: Cigarettes    Smokeless tobacco: Current    Tobacco comments:     Vape   Vaping Use    Vaping status: Former    Substances: Nicotine   Substance Use Topics    Alcohol use: Not Currently     Comment: rarely    Drug use: Not Currently     Types: Marijuana        Joanna Hoang DO  06/11/25 0912     Post-Care Instructions: I reviewed with the patient in detail post-care instructions. Patient is to wear sunprotection, and avoid picking at any of the treated lesions. Pt may apply Vaseline to crusted or scabbing areas. Duration Of Freeze Thaw-Cycle (Seconds): 0 Detail Level: Detailed Consent: The patient's consent was obtained including but not limited to risks of crusting, scabbing, blistering, scarring, darker or lighter pigmentary change, recurrence, incomplete removal and infection.

## (undated) DEVICE — GLOVE SRG BIOGEL 7

## (undated) DEVICE — GLOVE SRG BIOGEL ORTHOPEDIC 8

## (undated) DEVICE — CHLORAPREP HI-LITE 26ML ORANGE

## (undated) DEVICE — REM POLYHESIVE ADULT PATIENT RETURN ELECTRODE: Brand: VALLEYLAB

## (undated) DEVICE — 2000CC GUARDIAN II: Brand: GUARDIAN

## (undated) DEVICE — CULTURE TUBE AEROBIC

## (undated) DEVICE — STANDARD SURGICAL GOWN, L: Brand: CONVERTORS

## (undated) DEVICE — ADHESIVE SKN CLSR HISTOACRYL FLEX 0.5ML LF

## (undated) DEVICE — SUT MONOCRYL 4-0 PS-2 18 IN Y496G

## (undated) DEVICE — TELFA NON-ADHERENT ABSORBENT DRESSING: Brand: TELFA

## (undated) DEVICE — CULTURE TUBE ANAEROBIC

## (undated) DEVICE — SUT VICRYL 3-0 SH 27 IN J416H

## (undated) DEVICE — GLOVE INDICATOR PI UNDERGLOVE SZ 6.5 BLUE

## (undated) DEVICE — PLUMEPEN PRO 10FT

## (undated) DEVICE — STRL UNIVERSAL MINOR GENERAL: Brand: CARDINAL HEALTH

## (undated) DEVICE — INTENDED FOR TISSUE SEPARATION, AND OTHER PROCEDURES THAT REQUIRE A SHARP SURGICAL BLADE TO PUNCTURE OR CUT.: Brand: BARD-PARKER SAFETY BLADES SIZE 15, STERILE